# Patient Record
Sex: FEMALE | Race: BLACK OR AFRICAN AMERICAN | Employment: OTHER | URBAN - METROPOLITAN AREA
[De-identification: names, ages, dates, MRNs, and addresses within clinical notes are randomized per-mention and may not be internally consistent; named-entity substitution may affect disease eponyms.]

---

## 2017-01-13 ENCOUNTER — HOSPITAL ENCOUNTER (OUTPATIENT)
Dept: GENERAL RADIOLOGY | Age: 77
Discharge: HOME | End: 2017-01-13

## 2017-01-13 LAB
ALBUMIN SERPL-MCNC: 4.4 GM/DL (ref 3.4–5)
ALP BLD-CCNC: 90 IU/L (ref 40–128)
ALT SERPL-CCNC: 17 U/L (ref 10–40)
ANION GAP SERPL CALCULATED.3IONS-SCNC: 16 MMOL/L (ref 4–16)
AST SERPL-CCNC: 31 IU/L (ref 15–37)
BASOPHILS ABSOLUTE: 0 K/CU MM
BASOPHILS RELATIVE PERCENT: 0.6 % (ref 0–1)
BILIRUB SERPL-MCNC: 0.6 MG/DL (ref 0–1)
BUN BLDV-MCNC: 21 MG/DL (ref 6–23)
CALCIUM SERPL-MCNC: 9.5 MG/DL (ref 8.3–10.6)
CHLORIDE BLD-SCNC: 99 MMOL/L (ref 99–110)
CHOLESTEROL: 203 MG/DL
CO2: 24 MMOL/L (ref 21–32)
CREAT SERPL-MCNC: 1.6 MG/DL (ref 0.6–1.1)
CREATININE URINE: 165.4 MG/DL (ref 28–217)
DIFFERENTIAL TYPE: ABNORMAL
EOSINOPHILS ABSOLUTE: 0.3 K/CU MM
EOSINOPHILS RELATIVE PERCENT: 4 % (ref 0–3)
ESTIMATED AVERAGE GLUCOSE: 120 MG/DL
GFR AFRICAN AMERICAN: 38 ML/MIN/1.73M2
GFR NON-AFRICAN AMERICAN: 31 ML/MIN/1.73M2
GLUCOSE FASTING: 86 MG/DL (ref 70–99)
HBA1C MFR BLD: 5.8 % (ref 4.2–6.3)
HCT VFR BLD CALC: 39.5 % (ref 37–47)
HDLC SERPL-MCNC: 51 MG/DL
HEMOGLOBIN: 12.1 GM/DL (ref 12.5–16)
IMMATURE NEUTROPHIL %: 0.1 % (ref 0–0.43)
LDL CHOLESTEROL CALCULATED: 127 MG/DL
LYMPHOCYTES ABSOLUTE: 2.8 K/CU MM
LYMPHOCYTES RELATIVE PERCENT: 39.2 % (ref 24–44)
MCH RBC QN AUTO: 28.9 PG (ref 27–31)
MCHC RBC AUTO-ENTMCNC: 30.6 % (ref 32–36)
MCV RBC AUTO: 94.3 FL (ref 78–100)
MICROALBUMIN/CREAT 24H UR: <1.2 MG/DL
MICROALBUMIN/CREAT UR-RTO: NORMAL MG/G CREAT (ref 0–30)
MONOCYTES ABSOLUTE: 0.6 K/CU MM
MONOCYTES RELATIVE PERCENT: 7.9 % (ref 0–4)
NUCLEATED RBC %: 0 %
PDW BLD-RTO: 14.3 % (ref 11.7–14.9)
PLATELET # BLD: 352 K/CU MM (ref 140–440)
PMV BLD AUTO: 10.6 FL (ref 7.5–11.1)
POTASSIUM SERPL-SCNC: 4.3 MMOL/L (ref 3.5–5.1)
RBC # BLD: 4.19 M/CU MM (ref 4.2–5.4)
SEGMENTED NEUTROPHILS ABSOLUTE COUNT: 3.5 K/CU MM
SEGMENTED NEUTROPHILS RELATIVE PERCENT: 48.2 % (ref 36–66)
SODIUM BLD-SCNC: 139 MMOL/L (ref 135–145)
TOTAL CK: 101 IU/L (ref 26–140)
TOTAL IMMATURE NEUTOROPHIL: 0.01 K/CU MM
TOTAL NUCLEATED RBC: 0 K/CU MM
TOTAL PROTEIN: 7.8 GM/DL (ref 6.4–8.2)
TRIGL SERPL-MCNC: 125 MG/DL
WBC # BLD: 7.2 K/CU MM (ref 4–10.5)

## 2017-06-30 ENCOUNTER — HOSPITAL ENCOUNTER (OUTPATIENT)
Dept: GENERAL RADIOLOGY | Age: 77
Discharge: HOME | End: 2017-06-30

## 2017-06-30 LAB
ALBUMIN SERPL-MCNC: 4.4 GM/DL (ref 3.4–5)
ALP BLD-CCNC: 88 IU/L (ref 40–128)
ALT SERPL-CCNC: 18 U/L (ref 10–40)
ANION GAP SERPL CALCULATED.3IONS-SCNC: 19 MMOL/L (ref 4–16)
AST SERPL-CCNC: 33 IU/L (ref 15–37)
BASOPHILS ABSOLUTE: 0 K/CU MM
BASOPHILS RELATIVE PERCENT: 0.5 % (ref 0–1)
BILIRUB SERPL-MCNC: 0.6 MG/DL (ref 0–1)
BUN BLDV-MCNC: 21 MG/DL (ref 6–23)
CALCIUM SERPL-MCNC: 9.6 MG/DL (ref 8.3–10.6)
CHLORIDE BLD-SCNC: 100 MMOL/L (ref 99–110)
CHOLESTEROL: 183 MG/DL
CO2: 21 MMOL/L (ref 21–32)
CREAT SERPL-MCNC: 1.6 MG/DL (ref 0.6–1.1)
CREATININE URINE: 210 MG/DL (ref 28–217)
DIFFERENTIAL TYPE: ABNORMAL
EOSINOPHILS ABSOLUTE: 0.2 K/CU MM
EOSINOPHILS RELATIVE PERCENT: 2.1 % (ref 0–3)
ESTIMATED AVERAGE GLUCOSE: 128 MG/DL
GFR AFRICAN AMERICAN: 38 ML/MIN/1.73M2
GFR NON-AFRICAN AMERICAN: 31 ML/MIN/1.73M2
GLUCOSE FASTING: 97 MG/DL (ref 70–99)
HBA1C MFR BLD: 6.1 % (ref 4.2–6.3)
HCT VFR BLD CALC: 37.8 % (ref 37–47)
HDLC SERPL-MCNC: 54 MG/DL
HEMOGLOBIN: 11.8 GM/DL (ref 12.5–16)
IMMATURE NEUTROPHIL %: 0.2 % (ref 0–0.43)
LDL CHOLESTEROL CALCULATED: 110 MG/DL
LYMPHOCYTES ABSOLUTE: 3.3 K/CU MM
LYMPHOCYTES RELATIVE PERCENT: 39.5 % (ref 24–44)
MCH RBC QN AUTO: 29.1 PG (ref 27–31)
MCHC RBC AUTO-ENTMCNC: 31.2 % (ref 32–36)
MCV RBC AUTO: 93.1 FL (ref 78–100)
MICROALBUMIN/CREAT 24H UR: <1.2 MG/DL
MICROALBUMIN/CREAT UR-RTO: NORMAL MG/G CREAT (ref 0–30)
MONOCYTES ABSOLUTE: 0.7 K/CU MM
MONOCYTES RELATIVE PERCENT: 7.9 % (ref 0–4)
NUCLEATED RBC %: 0 %
PDW BLD-RTO: 13.5 % (ref 11.7–14.9)
PLATELET # BLD: 377 K/CU MM (ref 140–440)
PMV BLD AUTO: 10.5 FL (ref 7.5–11.1)
POTASSIUM SERPL-SCNC: 4.5 MMOL/L (ref 3.5–5.1)
RBC # BLD: 4.06 M/CU MM (ref 4.2–5.4)
SEGMENTED NEUTROPHILS ABSOLUTE COUNT: 4.1 K/CU MM
SEGMENTED NEUTROPHILS RELATIVE PERCENT: 49.8 % (ref 36–66)
SODIUM BLD-SCNC: 140 MMOL/L (ref 135–145)
TOTAL CK: 140 IU/L (ref 26–140)
TOTAL IMMATURE NEUTOROPHIL: 0.02 K/CU MM
TOTAL NUCLEATED RBC: 0 K/CU MM
TOTAL PROTEIN: 8 GM/DL (ref 6.4–8.2)
TRIGL SERPL-MCNC: 95 MG/DL
WBC # BLD: 8.2 K/CU MM (ref 4–10.5)

## 2017-09-18 ENCOUNTER — HOSPITAL ENCOUNTER (OUTPATIENT)
Dept: WOMENS IMAGING | Age: 77
Discharge: HOME | End: 2017-09-18

## 2017-09-18 DIAGNOSIS — Z12.31 VISIT FOR SCREENING MAMMOGRAM: ICD-10-CM

## 2017-11-10 ENCOUNTER — HOSPITAL ENCOUNTER (OUTPATIENT)
Dept: GENERAL RADIOLOGY | Age: 77
Discharge: HOME | End: 2017-11-10

## 2017-11-10 LAB
ALBUMIN SERPL-MCNC: 4.5 GM/DL (ref 3.4–5)
ALP BLD-CCNC: 87 IU/L (ref 40–128)
ALT SERPL-CCNC: 16 U/L (ref 10–40)
ANION GAP SERPL CALCULATED.3IONS-SCNC: 15 MMOL/L (ref 4–16)
AST SERPL-CCNC: 29 IU/L (ref 15–37)
BASOPHILS ABSOLUTE: 0.1 K/CU MM
BASOPHILS RELATIVE PERCENT: 0.5 % (ref 0–1)
BILIRUB SERPL-MCNC: 0.5 MG/DL (ref 0–1)
BUN BLDV-MCNC: 20 MG/DL (ref 6–23)
CALCIUM SERPL-MCNC: 9.3 MG/DL (ref 8.3–10.6)
CHLORIDE BLD-SCNC: 99 MMOL/L (ref 99–110)
CHOLESTEROL: 206 MG/DL
CO2: 25 MMOL/L (ref 21–32)
CREAT SERPL-MCNC: 1.4 MG/DL (ref 0.6–1.1)
CREATININE URINE: 143.7 MG/DL (ref 28–217)
DIFFERENTIAL TYPE: ABNORMAL
EOSINOPHILS ABSOLUTE: 0.4 K/CU MM
EOSINOPHILS RELATIVE PERCENT: 3.6 % (ref 0–3)
ESTIMATED AVERAGE GLUCOSE: 120 MG/DL
GFR AFRICAN AMERICAN: 44 ML/MIN/1.73M2
GFR NON-AFRICAN AMERICAN: 37 ML/MIN/1.73M2
GLUCOSE FASTING: 86 MG/DL (ref 70–99)
HBA1C MFR BLD: 5.8 % (ref 4.2–6.3)
HCT VFR BLD CALC: 38.2 % (ref 37–47)
HDLC SERPL-MCNC: 42 MG/DL
HEMOGLOBIN: 12.1 GM/DL (ref 12.5–16)
IMMATURE NEUTROPHIL %: 0.2 % (ref 0–0.43)
LDL CHOLESTEROL DIRECT: 136 MG/DL
LYMPHOCYTES ABSOLUTE: 3.4 K/CU MM
LYMPHOCYTES RELATIVE PERCENT: 35.3 % (ref 24–44)
MCH RBC QN AUTO: 29.8 PG (ref 27–31)
MCHC RBC AUTO-ENTMCNC: 31.7 % (ref 32–36)
MCV RBC AUTO: 94.1 FL (ref 78–100)
MICROALBUMIN/CREAT 24H UR: <1.2 MG/DL
MICROALBUMIN/CREAT UR-RTO: NORMAL MG/G CREAT (ref 0–30)
MONOCYTES ABSOLUTE: 0.6 K/CU MM
MONOCYTES RELATIVE PERCENT: 6.5 % (ref 0–4)
NUCLEATED RBC %: 0 %
PDW BLD-RTO: 14.2 % (ref 11.7–14.9)
PLATELET # BLD: 377 K/CU MM (ref 140–440)
PMV BLD AUTO: 10.5 FL (ref 7.5–11.1)
POTASSIUM SERPL-SCNC: 4.4 MMOL/L (ref 3.5–5.1)
RBC # BLD: 4.06 M/CU MM (ref 4.2–5.4)
SEGMENTED NEUTROPHILS ABSOLUTE COUNT: 5.2 K/CU MM
SEGMENTED NEUTROPHILS RELATIVE PERCENT: 53.9 % (ref 36–66)
SODIUM BLD-SCNC: 139 MMOL/L (ref 135–145)
TOTAL CK: 94 IU/L (ref 26–140)
TOTAL IMMATURE NEUTOROPHIL: 0.02 K/CU MM
TOTAL NUCLEATED RBC: 0 K/CU MM
TOTAL PROTEIN: 8 GM/DL (ref 6.4–8.2)
TRIGL SERPL-MCNC: 220 MG/DL
WBC # BLD: 9.6 K/CU MM (ref 4–10.5)

## 2018-02-13 ENCOUNTER — HOSPITAL ENCOUNTER (OUTPATIENT)
Dept: GENERAL RADIOLOGY | Age: 78
Discharge: HOME | End: 2018-02-13

## 2018-02-13 LAB
ESTIMATED AVERAGE GLUCOSE: 123 MG/DL
HBA1C MFR BLD: 5.9 % (ref 4.2–6.3)

## 2018-05-15 ENCOUNTER — HOSPITAL ENCOUNTER (OUTPATIENT)
Dept: GENERAL RADIOLOGY | Age: 78
Discharge: HOME | End: 2018-05-15

## 2018-05-15 LAB
ALBUMIN SERPL-MCNC: 4.4 GM/DL (ref 3.4–5)
ALP BLD-CCNC: 80 IU/L (ref 40–128)
ALT SERPL-CCNC: 12 U/L (ref 10–40)
ANION GAP SERPL CALCULATED.3IONS-SCNC: 17 MMOL/L (ref 4–16)
AST SERPL-CCNC: 22 IU/L (ref 15–37)
BASOPHILS ABSOLUTE: 0 K/CU MM
BASOPHILS RELATIVE PERCENT: 0.5 % (ref 0–1)
BILIRUB SERPL-MCNC: 0.5 MG/DL (ref 0–1)
BUN BLDV-MCNC: 23 MG/DL (ref 6–23)
CALCIUM SERPL-MCNC: 9.4 MG/DL (ref 8.3–10.6)
CHLORIDE BLD-SCNC: 101 MMOL/L (ref 99–110)
CHOLESTEROL: 173 MG/DL
CO2: 23 MMOL/L (ref 21–32)
CREAT SERPL-MCNC: 1.6 MG/DL (ref 0.6–1.1)
CREATININE URINE: 320.4 MG/DL (ref 28–217)
DIFFERENTIAL TYPE: ABNORMAL
EOSINOPHILS ABSOLUTE: 0.3 K/CU MM
EOSINOPHILS RELATIVE PERCENT: 4.3 % (ref 0–3)
ESTIMATED AVERAGE GLUCOSE: 128 MG/DL
GFR AFRICAN AMERICAN: 38 ML/MIN/1.73M2
GFR NON-AFRICAN AMERICAN: 31 ML/MIN/1.73M2
GLUCOSE FASTING: 93 MG/DL (ref 70–99)
HBA1C MFR BLD: 6.1 % (ref 4.2–6.3)
HCT VFR BLD CALC: 38.3 % (ref 37–47)
HDLC SERPL-MCNC: 46 MG/DL
HEMOGLOBIN: 12.1 GM/DL (ref 12.5–16)
IMMATURE NEUTROPHIL %: 0.2 % (ref 0–0.43)
LDL CHOLESTEROL DIRECT: 124 MG/DL
LYMPHOCYTES ABSOLUTE: 2.7 K/CU MM
LYMPHOCYTES RELATIVE PERCENT: 42.7 % (ref 24–44)
MCH RBC QN AUTO: 30 PG (ref 27–31)
MCHC RBC AUTO-ENTMCNC: 31.6 % (ref 32–36)
MCV RBC AUTO: 95 FL (ref 78–100)
MICROALBUMIN/CREAT 24H UR: 2.3 MG/DL
MICROALBUMIN/CREAT UR-RTO: 7.2 MG/G CREAT (ref 0–30)
MONOCYTES ABSOLUTE: 0.6 K/CU MM
MONOCYTES RELATIVE PERCENT: 9.5 % (ref 0–4)
NUCLEATED RBC %: 0 %
PDW BLD-RTO: 13.9 % (ref 11.7–14.9)
PLATELET # BLD: 345 K/CU MM (ref 140–440)
PMV BLD AUTO: 10.3 FL (ref 7.5–11.1)
POTASSIUM SERPL-SCNC: 3.9 MMOL/L (ref 3.5–5.1)
RBC # BLD: 4.03 M/CU MM (ref 4.2–5.4)
SEGMENTED NEUTROPHILS ABSOLUTE COUNT: 2.7 K/CU MM
SEGMENTED NEUTROPHILS RELATIVE PERCENT: 42.8 % (ref 36–66)
SODIUM BLD-SCNC: 141 MMOL/L (ref 135–145)
TOTAL CK: 123 IU/L (ref 26–140)
TOTAL IMMATURE NEUTOROPHIL: 0.01 K/CU MM
TOTAL NUCLEATED RBC: 0 K/CU MM
TOTAL PROTEIN: 8 GM/DL (ref 6.4–8.2)
TRIGL SERPL-MCNC: 136 MG/DL
WBC # BLD: 6.3 K/CU MM (ref 4–10.5)

## 2018-08-13 ENCOUNTER — HOSPITAL ENCOUNTER (OUTPATIENT)
Dept: GENERAL RADIOLOGY | Age: 78
Discharge: HOME | End: 2018-08-13

## 2018-08-13 LAB
ESTIMATED AVERAGE GLUCOSE: 128 MG/DL
HBA1C MFR BLD: 6.1 % (ref 4.2–6.3)

## 2018-11-02 ENCOUNTER — HOSPITAL ENCOUNTER (OUTPATIENT)
Dept: WOMENS IMAGING | Age: 78
Discharge: HOME | End: 2018-11-02
Payer: COMMERCIAL

## 2018-11-02 DIAGNOSIS — Z12.31 SCREENING MAMMOGRAM, ENCOUNTER FOR: ICD-10-CM

## 2018-11-02 PROCEDURE — 77067 SCR MAMMO BI INCL CAD: CPT

## 2018-11-10 ENCOUNTER — APPOINTMENT (OUTPATIENT)
Dept: GENERAL RADIOLOGY | Age: 78
End: 2018-11-10
Payer: COMMERCIAL

## 2018-11-10 ENCOUNTER — HOSPITAL ENCOUNTER (EMERGENCY)
Age: 78
Discharge: HOME | End: 2018-11-10
Attending: EMERGENCY MEDICINE
Payer: COMMERCIAL

## 2018-11-10 VITALS
SYSTOLIC BLOOD PRESSURE: 155 MMHG | DIASTOLIC BLOOD PRESSURE: 60 MMHG | TEMPERATURE: 98.8 F | HEART RATE: 86 BPM | OXYGEN SATURATION: 99 % | RESPIRATION RATE: 18 BRPM | WEIGHT: 235 LBS | HEIGHT: 64 IN | BODY MASS INDEX: 40.12 KG/M2

## 2018-11-10 DIAGNOSIS — J02.9 ACUTE PHARYNGITIS, UNSPECIFIED ETIOLOGY: Primary | ICD-10-CM

## 2018-11-10 PROCEDURE — 6370000000 HC RX 637 (ALT 250 FOR IP): Performed by: EMERGENCY MEDICINE

## 2018-11-10 PROCEDURE — 71046 X-RAY EXAM CHEST 2 VIEWS: CPT

## 2018-11-10 PROCEDURE — 99283 EMERGENCY DEPT VISIT LOW MDM: CPT

## 2018-11-10 RX ORDER — BENZONATATE 100 MG/1
100 CAPSULE ORAL 3 TIMES DAILY PRN
Qty: 10 CAPSULE | Refills: 0 | Status: SHIPPED | OUTPATIENT
Start: 2018-11-10 | End: 2018-11-17

## 2018-11-10 RX ORDER — IBUPROFEN 600 MG/1
600 TABLET ORAL ONCE
Status: COMPLETED | OUTPATIENT
Start: 2018-11-10 | End: 2018-11-10

## 2018-11-10 RX ORDER — GUAIFENESIN 100 MG/5ML
200 SOLUTION ORAL EVERY 4 HOURS PRN
Status: DISCONTINUED | OUTPATIENT
Start: 2018-11-10 | End: 2018-11-10 | Stop reason: HOSPADM

## 2018-11-10 RX ADMIN — GUAIFENESIN 200 MG: 200 SOLUTION ORAL at 06:20

## 2018-11-10 RX ADMIN — IBUPROFEN 600 MG: 600 TABLET, FILM COATED ORAL at 06:21

## 2018-11-10 ASSESSMENT — PAIN SCALES - GENERAL
PAINLEVEL_OUTOF10: 10
PAINLEVEL_OUTOF10: 10

## 2018-11-10 ASSESSMENT — PAIN DESCRIPTION - PAIN TYPE: TYPE: ACUTE PAIN

## 2018-11-10 ASSESSMENT — PAIN DESCRIPTION - LOCATION: LOCATION: THROAT

## 2018-11-10 NOTE — ED PROVIDER NOTES
Umbilical Hernia Repair    HIATAL HERNIA REPAIR  2014    INGUINAL HERNIA REPAIR Right 1980's Or 1990's    TONSILLECTOMY  1970's    TUBAL LIGATION  1965    WISDOM TOOTH EXTRACTION      Three Removed In Past     Family History   Problem Relation Age of Onset    Heart Disease Mother         AAA    Early Death Father 28        Heart Attack    Heart Disease Father         Heart Attack     Early Death Brother         In His 42's Or 52's    Early Death Brother 40        Heart Attack    Heart Disease Brother         Heart Attack    Other Daughter         Pseudo Brain Tumor    Early Death Son 28     Social History     Social History    Marital status: Single     Spouse name: N/A    Number of children: N/A    Years of education: N/A     Occupational History    Not on file.      Social History Main Topics    Smoking status: Former Smoker     Years: 7.00     Start date: 7/30/1971     Quit date: 7/30/1978    Smokeless tobacco: Never Used    Alcohol use No    Drug use: No      Comment: \"Smoked About 2 Or 3 Cigarettes A Day When Quit, I Didn't Ever Inhale\"    Sexual activity: No     Other Topics Concern    Not on file     Social History Narrative    No narrative on file     Current Facility-Administered Medications   Medication Dose Route Frequency Provider Last Rate Last Dose    guaiFENesin (ROBITUSSIN) 100 MG/5ML oral solution 200 mg  200 mg Oral Q4H PRN Jayson Goodman MD   200 mg at 11/10/18 0620     Current Outpatient Prescriptions   Medication Sig Dispense Refill    benzonatate (TESSALON PERLES) 100 MG capsule Take 1 capsule by mouth 3 times daily as needed for Cough 10 capsule 0    triamterene-hydrochlorothiazide (MAXZIDE-25) 37.5-25 MG per tablet TAKE 1 TABLET BY MOUTH ONE TIME A DAY  30 tablet 4    ondansetron (ZOFRAN ODT) 4 MG disintegrating tablet Take 1 tablet by mouth every 8 hours as needed for Nausea 15 tablet 0    HYDROcodone-acetaminophen (NORCO) 5-325 MG per tablet Take 1-2 tablets by currently available lab results from this visit (if applicable):  No results found for this visit on 11/10/18. Radiographs (if obtained):  [] The following radiograph was interpreted by myself in the absence of a radiologist:  [x] Radiologist's Report Reviewed:    EKG (if obtained): (All EKG's are interpreted by myself in the absence of a cardiologist)    MDM:  Plan of care is discussed thoroughly with the patient and family if present. If performed, all imaging and lab work also discussed with patient. All relevant prior results and chart reviewed if available. Patient presents with symptoms most consistent with likely viral URI infection. She is able tolerate her secretions. Vital signs initially significant for mild tachycardia but on my exam, the patient had normal heart rate was afebrile. No evidence of meningitis, PTA, retropharyngeal abscess or other obstructive process. Patient given guaifenesin and ibuprofen for symptoms. Patient doing well on reevaluation. Heart rate is in the 80s. Chest x-ray shows no evidence of pneumonia. Plan for discharge with Tessalon Perles, continue ibuprofen, oral rehydration and follow up with PCP in one day. She is agreeable with this plan of care. Clinical Impression:  1.  Acute pharyngitis, unspecified etiology      (Please note that portions of this note may have been completed with a voice recognition program. Efforts were made to edit the dictations but occasionally words are mis-transcribed.)    MD Juan Miguel Ontiveros MD  11/10/18 7993

## 2018-11-23 ENCOUNTER — HOSPITAL ENCOUNTER (OUTPATIENT)
Age: 78
Discharge: HOME | End: 2018-11-23
Payer: COMMERCIAL

## 2018-11-23 LAB
ALBUMIN SERPL-MCNC: 4.3 GM/DL (ref 3.4–5)
ALP BLD-CCNC: 72 IU/L (ref 40–128)
ALT SERPL-CCNC: 15 U/L (ref 10–40)
ANION GAP SERPL CALCULATED.3IONS-SCNC: 15 MMOL/L (ref 4–16)
AST SERPL-CCNC: 27 IU/L (ref 15–37)
BASOPHILS ABSOLUTE: 0 K/CU MM
BASOPHILS RELATIVE PERCENT: 0.4 % (ref 0–1)
BILIRUB SERPL-MCNC: 0.6 MG/DL (ref 0–1)
BUN BLDV-MCNC: 19 MG/DL (ref 6–23)
CALCIUM SERPL-MCNC: 9.2 MG/DL (ref 8.3–10.6)
CHLORIDE BLD-SCNC: 98 MMOL/L (ref 99–110)
CHOLESTEROL: 194 MG/DL
CO2: 25 MMOL/L (ref 21–32)
CREAT SERPL-MCNC: 1.3 MG/DL (ref 0.6–1.1)
CREATININE URINE: 281 MG/DL (ref 28–217)
DIFFERENTIAL TYPE: ABNORMAL
EOSINOPHILS ABSOLUTE: 0.3 K/CU MM
EOSINOPHILS RELATIVE PERCENT: 3.5 % (ref 0–3)
ESTIMATED AVERAGE GLUCOSE: 123 MG/DL
GFR AFRICAN AMERICAN: 48 ML/MIN/1.73M2
GFR NON-AFRICAN AMERICAN: 40 ML/MIN/1.73M2
GLUCOSE BLD-MCNC: 128 MG/DL (ref 70–99)
HBA1C MFR BLD: 5.9 % (ref 4.2–6.3)
HCT VFR BLD CALC: 37.1 % (ref 37–47)
HDLC SERPL-MCNC: 41 MG/DL
HEMOGLOBIN: 11.4 GM/DL (ref 12.5–16)
IMMATURE NEUTROPHIL %: 0.3 % (ref 0–0.43)
LDL CHOLESTEROL DIRECT: 143 MG/DL
LYMPHOCYTES ABSOLUTE: 2.7 K/CU MM
LYMPHOCYTES RELATIVE PERCENT: 34.1 % (ref 24–44)
MCH RBC QN AUTO: 29 PG (ref 27–31)
MCHC RBC AUTO-ENTMCNC: 30.7 % (ref 32–36)
MCV RBC AUTO: 94.4 FL (ref 78–100)
MICROALBUMIN/CREAT 24H UR: <1.2 MG/DL
MICROALBUMIN/CREAT UR-RTO: ABNORMAL MG/G CREAT (ref 0–30)
MONOCYTES ABSOLUTE: 0.6 K/CU MM
MONOCYTES RELATIVE PERCENT: 7.4 % (ref 0–4)
NUCLEATED RBC %: 0 %
PDW BLD-RTO: 13.5 % (ref 11.7–14.9)
PLATELET # BLD: 354 K/CU MM (ref 140–440)
PMV BLD AUTO: 10.4 FL (ref 7.5–11.1)
POTASSIUM SERPL-SCNC: 4 MMOL/L (ref 3.5–5.1)
RBC # BLD: 3.93 M/CU MM (ref 4.2–5.4)
SEGMENTED NEUTROPHILS ABSOLUTE COUNT: 4.3 K/CU MM
SEGMENTED NEUTROPHILS RELATIVE PERCENT: 54.3 % (ref 36–66)
SODIUM BLD-SCNC: 138 MMOL/L (ref 135–145)
TOTAL CK: 196 IU/L (ref 26–140)
TOTAL IMMATURE NEUTOROPHIL: 0.02 K/CU MM
TOTAL NUCLEATED RBC: 0 K/CU MM
TOTAL PROTEIN: 7.7 GM/DL (ref 6.4–8.2)
TRIGL SERPL-MCNC: 173 MG/DL
WBC # BLD: 7.8 K/CU MM (ref 4–10.5)

## 2018-11-23 PROCEDURE — 85025 COMPLETE CBC W/AUTO DIFF WBC: CPT

## 2018-11-23 PROCEDURE — 36415 COLL VENOUS BLD VENIPUNCTURE: CPT

## 2018-11-23 PROCEDURE — 82043 UR ALBUMIN QUANTITATIVE: CPT

## 2018-11-23 PROCEDURE — 83721 ASSAY OF BLOOD LIPOPROTEIN: CPT

## 2018-11-23 PROCEDURE — 82570 ASSAY OF URINE CREATININE: CPT

## 2018-11-23 PROCEDURE — 82550 ASSAY OF CK (CPK): CPT

## 2018-11-23 PROCEDURE — 80053 COMPREHEN METABOLIC PANEL: CPT

## 2018-11-23 PROCEDURE — 80061 LIPID PANEL: CPT

## 2018-11-23 PROCEDURE — 83036 HEMOGLOBIN GLYCOSYLATED A1C: CPT

## 2018-11-26 ENCOUNTER — HOSPITAL ENCOUNTER (OUTPATIENT)
Age: 78
Setting detail: SPECIMEN
Discharge: HOME | End: 2018-11-26
Payer: COMMERCIAL

## 2018-11-26 LAB
BACTERIA: ABNORMAL /HPF
BILIRUBIN URINE: NEGATIVE MG/DL
BLOOD, URINE: NEGATIVE
CLARITY: CLEAR
COLOR: YELLOW
GLUCOSE, URINE: NEGATIVE MG/DL
HYALINE CASTS: 0 /LPF
KETONES, URINE: NEGATIVE MG/DL
LEUKOCYTE ESTERASE, URINE: ABNORMAL
MUCUS: ABNORMAL HPF
NITRITE URINE, QUANTITATIVE: NEGATIVE
PH, URINE: 5 (ref 5–8)
PROTEIN UA: NEGATIVE MG/DL
RBC URINE: 1 /HPF (ref 0–6)
SPECIFIC GRAVITY UA: 1.02 (ref 1–1.03)
SQUAMOUS EPITHELIAL: 9 /HPF
TRANSITIONAL EPITHELIAL: 1 /HPF
TRICHOMONAS: ABNORMAL /HPF
UROBILINOGEN, URINE: NORMAL MG/DL (ref 0.2–1)
WBC UA: 2 /HPF (ref 0–5)

## 2018-11-26 PROCEDURE — 87077 CULTURE AEROBIC IDENTIFY: CPT

## 2018-11-26 PROCEDURE — 87086 URINE CULTURE/COLONY COUNT: CPT

## 2018-11-26 PROCEDURE — 87186 SC STD MICRODIL/AGAR DIL: CPT

## 2018-11-26 PROCEDURE — 81001 URINALYSIS AUTO W/SCOPE: CPT

## 2018-11-29 LAB
CULTURE: NORMAL
CULTURE: NORMAL
Lab: NORMAL
ORGANISM: NORMAL
REPORT STATUS: NORMAL
SPECIMEN: NORMAL
TOTAL COLONY COUNT: NORMAL

## 2019-01-01 ENCOUNTER — HOSPITAL ENCOUNTER (OUTPATIENT)
Dept: GENERAL RADIOLOGY | Age: 79
Discharge: HOME | End: 2019-06-17
Payer: COMMERCIAL

## 2019-01-01 ENCOUNTER — OFFICE VISIT (OUTPATIENT)
Dept: PHYSICAL MEDICINE AND REHAB | Age: 79
End: 2019-01-01
Payer: COMMERCIAL

## 2019-01-01 ENCOUNTER — HOSPITAL ENCOUNTER (OUTPATIENT)
Age: 79
Discharge: HOME | End: 2019-12-05
Payer: COMMERCIAL

## 2019-01-01 ENCOUNTER — HOSPITAL ENCOUNTER (OUTPATIENT)
Dept: CT IMAGING | Age: 79
Discharge: HOME | End: 2019-12-05
Payer: COMMERCIAL

## 2019-01-01 ENCOUNTER — ANESTHESIA (OUTPATIENT)
Dept: ENDOSCOPY | Age: 79
End: 2019-01-01
Payer: COMMERCIAL

## 2019-01-01 ENCOUNTER — HOSPITAL ENCOUNTER (OUTPATIENT)
Age: 79
Setting detail: SPECIMEN
Discharge: HOME | End: 2019-12-07
Payer: COMMERCIAL

## 2019-01-01 ENCOUNTER — HOSPITAL ENCOUNTER (OUTPATIENT)
Age: 79
Setting detail: SPECIMEN
Discharge: HOME | End: 2019-12-10
Payer: COMMERCIAL

## 2019-01-01 ENCOUNTER — HOSPITAL ENCOUNTER (OUTPATIENT)
Age: 79
Discharge: HOME | End: 2019-06-17
Payer: COMMERCIAL

## 2019-01-01 ENCOUNTER — HOSPITAL ENCOUNTER (OUTPATIENT)
Dept: WOMENS IMAGING | Age: 79
Discharge: HOME | End: 2019-12-05
Payer: COMMERCIAL

## 2019-01-01 ENCOUNTER — HOSPITAL ENCOUNTER (OUTPATIENT)
Age: 79
Discharge: HOME | End: 2019-11-15
Payer: COMMERCIAL

## 2019-01-01 ENCOUNTER — HOSPITAL ENCOUNTER (OUTPATIENT)
Age: 79
Discharge: HOME | End: 2019-09-19
Payer: COMMERCIAL

## 2019-01-01 ENCOUNTER — APPOINTMENT (OUTPATIENT)
Dept: NUCLEAR MEDICINE | Age: 79
End: 2019-01-01
Payer: COMMERCIAL

## 2019-01-01 ENCOUNTER — ANESTHESIA EVENT (OUTPATIENT)
Dept: ENDOSCOPY | Age: 79
End: 2019-01-01
Payer: COMMERCIAL

## 2019-01-01 ENCOUNTER — APPOINTMENT (OUTPATIENT)
Dept: CT IMAGING | Age: 79
End: 2019-01-01
Payer: COMMERCIAL

## 2019-01-01 ENCOUNTER — HOSPITAL ENCOUNTER (OUTPATIENT)
Age: 79
Setting detail: OBSERVATION
Discharge: HOME | End: 2019-12-28
Attending: EMERGENCY MEDICINE | Admitting: INTERNAL MEDICINE
Payer: COMMERCIAL

## 2019-01-01 VITALS
TEMPERATURE: 98.5 F | HEIGHT: 64 IN | OXYGEN SATURATION: 98 % | HEART RATE: 67 BPM | WEIGHT: 227.6 LBS | BODY MASS INDEX: 38.86 KG/M2 | SYSTOLIC BLOOD PRESSURE: 124 MMHG | DIASTOLIC BLOOD PRESSURE: 52 MMHG | RESPIRATION RATE: 17 BRPM

## 2019-01-01 VITALS — OXYGEN SATURATION: 96 % | SYSTOLIC BLOOD PRESSURE: 107 MMHG | DIASTOLIC BLOOD PRESSURE: 37 MMHG

## 2019-01-01 DIAGNOSIS — M17.0 PRIMARY OSTEOARTHRITIS OF BOTH KNEES: ICD-10-CM

## 2019-01-01 DIAGNOSIS — E11.42 DIABETIC SENSORIMOTOR POLYNEUROPATHY (HCC): Primary | ICD-10-CM

## 2019-01-01 DIAGNOSIS — R10.10 PAIN OF UPPER ABDOMEN: ICD-10-CM

## 2019-01-01 DIAGNOSIS — Z12.31 VISIT FOR SCREENING MAMMOGRAM: ICD-10-CM

## 2019-01-01 DIAGNOSIS — D64.9 ANEMIA, UNSPECIFIED TYPE: ICD-10-CM

## 2019-01-01 DIAGNOSIS — R20.2 PARESTHESIA OF BILATERAL LEGS: ICD-10-CM

## 2019-01-01 DIAGNOSIS — M54.17 LUMBOSACRAL RADICULOPATHY AT S1: ICD-10-CM

## 2019-01-01 LAB
ALBUMIN ELP: 3.8 GM/DL (ref 3.2–5.6)
ALBUMIN SERPL-MCNC: 3.4 GM/DL (ref 3.4–5)
ALBUMIN SERPL-MCNC: 3.9 GM/DL (ref 3.4–5)
ALBUMIN SERPL-MCNC: 4 GM/DL (ref 3.4–5)
ALBUMIN SERPL-MCNC: 4.1 GM/DL (ref 3.4–5)
ALBUMIN SERPL-MCNC: 4.2 GM/DL (ref 3.4–5)
ALBUMIN SERPL-MCNC: 4.3 GM/DL (ref 3.4–5)
ALBUMIN, U: 100 %
ALP BLD-CCNC: 55 IU/L (ref 40–129)
ALP BLD-CCNC: 66 IU/L (ref 40–129)
ALP BLD-CCNC: 67 IU/L (ref 40–128)
ALP BLD-CCNC: 70 IU/L (ref 40–128)
ALP BLD-CCNC: 77 IU/L (ref 40–129)
ALP BLD-CCNC: 86 IU/L (ref 40–128)
ALPHA-1-GLOBULIN, U: 0 %
ALPHA-1-GLOBULIN: 0.3 GM/DL (ref 0.1–0.4)
ALPHA-2-GLOBULIN, U: 0 %
ALPHA-2-GLOBULIN: 0.9 GM/DL (ref 0.4–1.2)
ALT SERPL-CCNC: 11 U/L (ref 10–40)
ALT SERPL-CCNC: 12 U/L (ref 10–40)
ALT SERPL-CCNC: 12 U/L (ref 10–40)
ALT SERPL-CCNC: 14 U/L (ref 10–40)
ALT SERPL-CCNC: 15 U/L (ref 10–40)
ALT SERPL-CCNC: 16 U/L (ref 10–40)
ANION GAP SERPL CALCULATED.3IONS-SCNC: 12 MMOL/L (ref 4–16)
ANION GAP SERPL CALCULATED.3IONS-SCNC: 13 MMOL/L (ref 4–16)
ANION GAP SERPL CALCULATED.3IONS-SCNC: 14 MMOL/L (ref 4–16)
ANION GAP SERPL CALCULATED.3IONS-SCNC: 14 MMOL/L (ref 4–16)
ANION GAP SERPL CALCULATED.3IONS-SCNC: 17 MMOL/L (ref 4–16)
ANION GAP SERPL CALCULATED.3IONS-SCNC: 18 MMOL/L (ref 4–16)
ANION GAP SERPL CALCULATED.3IONS-SCNC: 19 MMOL/L (ref 4–16)
ANTI-NUCLEAR ANTIBODY (ANA): NORMAL
ANTI-NUCLEAR ANTIBODY (ANA): NORMAL
AST SERPL-CCNC: 21 IU/L (ref 15–37)
AST SERPL-CCNC: 22 IU/L (ref 15–37)
AST SERPL-CCNC: 27 IU/L (ref 15–37)
AST SERPL-CCNC: 29 IU/L (ref 15–37)
AST SERPL-CCNC: 31 IU/L (ref 15–37)
AST SERPL-CCNC: 32 IU/L (ref 15–37)
BACTERIA: ABNORMAL /HPF
BACTERIA: NEGATIVE /HPF
BACTERIA: NEGATIVE /HPF
BASOPHILS ABSOLUTE: 0 K/CU MM
BASOPHILS RELATIVE PERCENT: 0.2 % (ref 0–1)
BASOPHILS RELATIVE PERCENT: 0.3 % (ref 0–1)
BASOPHILS RELATIVE PERCENT: 0.4 % (ref 0–1)
BASOPHILS RELATIVE PERCENT: 0.5 % (ref 0–1)
BASOPHILS RELATIVE PERCENT: 0.6 % (ref 0–1)
BETA GLOBULIN, U: 0 %
BETA GLOBULIN: 1.2 GM/DL (ref 0.5–1.3)
BILIRUB SERPL-MCNC: 0.3 MG/DL (ref 0–1)
BILIRUB SERPL-MCNC: 0.4 MG/DL (ref 0–1)
BILIRUB SERPL-MCNC: 0.5 MG/DL (ref 0–1)
BILIRUBIN DIRECT: 0.2 MG/DL (ref 0–0.3)
BILIRUBIN URINE: NEGATIVE MG/DL
BILIRUBIN, INDIRECT: 0.1 MG/DL (ref 0–0.7)
BLOOD, URINE: ABNORMAL
BLOOD, URINE: NEGATIVE
BLOOD, URINE: NEGATIVE
BUN BLDV-MCNC: 16 MG/DL (ref 6–23)
BUN BLDV-MCNC: 16 MG/DL (ref 6–23)
BUN BLDV-MCNC: 17 MG/DL (ref 6–23)
BUN BLDV-MCNC: 18 MG/DL (ref 6–23)
BUN BLDV-MCNC: 20 MG/DL (ref 6–23)
BUN BLDV-MCNC: 23 MG/DL (ref 6–23)
BUN BLDV-MCNC: 29 MG/DL (ref 6–23)
C-REACTIVE PROTEIN, HIGH SENSITIVITY: 0.7 MG/L
CALCIUM SERPL-MCNC: 8.1 MG/DL (ref 8.3–10.6)
CALCIUM SERPL-MCNC: 8.6 MG/DL (ref 8.3–10.6)
CALCIUM SERPL-MCNC: 8.6 MG/DL (ref 8.3–10.6)
CALCIUM SERPL-MCNC: 8.9 MG/DL (ref 8.3–10.6)
CALCIUM SERPL-MCNC: 9.4 MG/DL (ref 8.3–10.6)
CHLORIDE BLD-SCNC: 100 MMOL/L (ref 99–110)
CHLORIDE BLD-SCNC: 101 MMOL/L (ref 99–110)
CHLORIDE BLD-SCNC: 103 MMOL/L (ref 99–110)
CHLORIDE BLD-SCNC: 96 MMOL/L (ref 99–110)
CHLORIDE BLD-SCNC: 98 MMOL/L (ref 99–110)
CHOLESTEROL, FASTING: 219 MG/DL
CHOLESTEROL: 152 MG/DL
CHOLESTEROL: 159 MG/DL
CHOLESTEROL: 177 MG/DL
CLARITY: ABNORMAL
CLOTEST: NEGATIVE
CO2: 17 MMOL/L (ref 21–32)
CO2: 19 MMOL/L (ref 21–32)
CO2: 20 MMOL/L (ref 21–32)
CO2: 20 MMOL/L (ref 21–32)
CO2: 22 MMOL/L (ref 21–32)
CO2: 24 MMOL/L (ref 21–32)
CO2: 26 MMOL/L (ref 21–32)
COLOR: ABNORMAL
COLOR: YELLOW
COLOR: YELLOW
CREAT SERPL-MCNC: 1.5 MG/DL (ref 0.6–1.1)
CREAT SERPL-MCNC: 1.5 MG/DL (ref 0.6–1.1)
CREAT SERPL-MCNC: 1.7 MG/DL (ref 0.6–1.1)
CREAT SERPL-MCNC: 2 MG/DL (ref 0.6–1.1)
CREAT SERPL-MCNC: 2 MG/DL (ref 0.6–1.1)
CREAT SERPL-MCNC: 2.1 MG/DL (ref 0.6–1.1)
CREAT SERPL-MCNC: 2.3 MG/DL (ref 0.6–1.1)
CREATININE URINE: 277.4 MG/DL (ref 28–217)
CULTURE: ABNORMAL
DIFFERENTIAL TYPE: ABNORMAL
EKG ATRIAL RATE: 83 BPM
EKG DIAGNOSIS: NORMAL
EKG P AXIS: 58 DEGREES
EKG P-R INTERVAL: 186 MS
EKG Q-T INTERVAL: 374 MS
EKG QRS DURATION: 84 MS
EKG QTC CALCULATION (BAZETT): 439 MS
EKG R AXIS: 38 DEGREES
EKG T AXIS: 55 DEGREES
EKG VENTRICULAR RATE: 83 BPM
EOSINOPHILS ABSOLUTE: 0.1 K/CU MM
EOSINOPHILS ABSOLUTE: 0.1 K/CU MM
EOSINOPHILS ABSOLUTE: 0.2 K/CU MM
EOSINOPHILS ABSOLUTE: 0.2 K/CU MM
EOSINOPHILS ABSOLUTE: 0.3 K/CU MM
EOSINOPHILS RELATIVE PERCENT: 1.4 % (ref 0–3)
EOSINOPHILS RELATIVE PERCENT: 1.4 % (ref 0–3)
EOSINOPHILS RELATIVE PERCENT: 2.1 % (ref 0–3)
EOSINOPHILS RELATIVE PERCENT: 3.1 % (ref 0–3)
EOSINOPHILS RELATIVE PERCENT: 3.4 % (ref 0–3)
ERYTHROCYTE SEDIMENTATION RATE: 60 MM/HR (ref 0–30)
ESTIMATED AVERAGE GLUCOSE: 120 MG/DL
ESTIMATED AVERAGE GLUCOSE: 126 MG/DL
ESTIMATED AVERAGE GLUCOSE: 128 MG/DL
ESTIMATED AVERAGE GLUCOSE: 131 MG/DL
FERRITIN: 546 NG/ML (ref 15–150)
FOLATE: 3 NG/ML (ref 3.1–17.5)
GAMMA GLOBULIN, U: 0 %
GAMMA GLOBULIN: 1.2 GM/DL (ref 0.5–1.6)
GFR AFRICAN AMERICAN: 25 ML/MIN/1.73M2
GFR AFRICAN AMERICAN: 28 ML/MIN/1.73M2
GFR AFRICAN AMERICAN: 29 ML/MIN/1.73M2
GFR AFRICAN AMERICAN: 29 ML/MIN/1.73M2
GFR AFRICAN AMERICAN: 35 ML/MIN/1.73M2
GFR AFRICAN AMERICAN: 41 ML/MIN/1.73M2
GFR AFRICAN AMERICAN: 41 ML/MIN/1.73M2
GFR NON-AFRICAN AMERICAN: 21 ML/MIN/1.73M2
GFR NON-AFRICAN AMERICAN: 23 ML/MIN/1.73M2
GFR NON-AFRICAN AMERICAN: 24 ML/MIN/1.73M2
GFR NON-AFRICAN AMERICAN: 24 ML/MIN/1.73M2
GFR NON-AFRICAN AMERICAN: 29 ML/MIN/1.73M2
GFR NON-AFRICAN AMERICAN: 34 ML/MIN/1.73M2
GFR NON-AFRICAN AMERICAN: 34 ML/MIN/1.73M2
GLUCOSE BLD-MCNC: 100 MG/DL (ref 70–99)
GLUCOSE BLD-MCNC: 101 MG/DL (ref 70–99)
GLUCOSE BLD-MCNC: 103 MG/DL (ref 70–99)
GLUCOSE BLD-MCNC: 106 MG/DL (ref 70–99)
GLUCOSE BLD-MCNC: 109 MG/DL (ref 70–99)
GLUCOSE BLD-MCNC: 112 MG/DL (ref 70–99)
GLUCOSE BLD-MCNC: 113 MG/DL (ref 70–99)
GLUCOSE BLD-MCNC: 82 MG/DL (ref 70–99)
GLUCOSE BLD-MCNC: 84 MG/DL (ref 70–99)
GLUCOSE BLD-MCNC: 85 MG/DL (ref 70–99)
GLUCOSE BLD-MCNC: 85 MG/DL (ref 70–99)
GLUCOSE BLD-MCNC: 86 MG/DL (ref 70–99)
GLUCOSE BLD-MCNC: 88 MG/DL (ref 70–99)
GLUCOSE BLD-MCNC: 88 MG/DL (ref 70–99)
GLUCOSE BLD-MCNC: 89 MG/DL (ref 70–99)
GLUCOSE BLD-MCNC: 91 MG/DL (ref 70–99)
GLUCOSE BLD-MCNC: 92 MG/DL (ref 70–99)
GLUCOSE BLD-MCNC: 92 MG/DL (ref 70–99)
GLUCOSE BLD-MCNC: 94 MG/DL (ref 70–99)
GLUCOSE BLD-MCNC: 96 MG/DL (ref 70–99)
GLUCOSE BLD-MCNC: 98 MG/DL (ref 70–99)
GLUCOSE BLD-MCNC: 99 MG/DL (ref 70–99)
GLUCOSE BLD-MCNC: 99 MG/DL (ref 70–99)
GLUCOSE, URINE: NEGATIVE MG/DL
HBA1C MFR BLD: 5.8 % (ref 4.2–6.3)
HBA1C MFR BLD: 6 % (ref 4.2–6.3)
HBA1C MFR BLD: 6.1 % (ref 4.2–6.3)
HBA1C MFR BLD: 6.2 % (ref 4.2–6.3)
HCT VFR BLD CALC: 29.2 % (ref 37–47)
HCT VFR BLD CALC: 32.5 % (ref 37–47)
HCT VFR BLD CALC: 33.6 % (ref 37–47)
HCT VFR BLD CALC: 35.1 % (ref 37–47)
HCT VFR BLD CALC: 36.8 % (ref 37–47)
HDLC SERPL-MCNC: 35 MG/DL
HDLC SERPL-MCNC: 40 MG/DL
HDLC SERPL-MCNC: 41 MG/DL
HDLC SERPL-MCNC: 45 MG/DL
HEMOGLOBIN: 10.4 GM/DL (ref 12.5–16)
HEMOGLOBIN: 10.6 GM/DL (ref 12.5–16)
HEMOGLOBIN: 11.3 GM/DL (ref 12.5–16)
HEMOGLOBIN: 9 GM/DL (ref 12.5–16)
HEMOGLOBIN: 9.9 GM/DL (ref 12.5–16)
HYALINE CASTS: 10 /LPF
IMMATURE NEUTROPHIL %: 0.1 % (ref 0–0.43)
IMMATURE NEUTROPHIL %: 0.1 % (ref 0–0.43)
IMMATURE NEUTROPHIL %: 0.2 % (ref 0–0.43)
IMMATURE NEUTROPHIL %: 0.3 % (ref 0–0.43)
IMMATURE NEUTROPHIL %: 0.4 % (ref 0–0.43)
IRON: 103 UG/DL (ref 37–145)
KETONES, URINE: NEGATIVE MG/DL
LDL CHOLESTEROL DIRECT: 128 MG/DL
LDL CHOLESTEROL DIRECT: 170 MG/DL
LDL CHOLESTEROL DIRECT: 95 MG/DL
LDL CHOLESTEROL DIRECT: 97 MG/DL
LEUKOCYTE ESTERASE, URINE: ABNORMAL
LIPASE: 32 IU/L (ref 13–60)
LV EF: 55 %
LV EF: 70 %
LVEF MODALITY: NORMAL
LVEF MODALITY: NORMAL
LYMPHOCYTES ABSOLUTE: 1.9 K/CU MM
LYMPHOCYTES ABSOLUTE: 2.4 K/CU MM
LYMPHOCYTES ABSOLUTE: 3 K/CU MM
LYMPHOCYTES ABSOLUTE: 3.1 K/CU MM
LYMPHOCYTES ABSOLUTE: 3.3 K/CU MM
LYMPHOCYTES RELATIVE PERCENT: 22.8 % (ref 24–44)
LYMPHOCYTES RELATIVE PERCENT: 33.2 % (ref 24–44)
LYMPHOCYTES RELATIVE PERCENT: 34.5 % (ref 24–44)
LYMPHOCYTES RELATIVE PERCENT: 38.5 % (ref 24–44)
LYMPHOCYTES RELATIVE PERCENT: 43 % (ref 24–44)
Lab: ABNORMAL
Lab: ABNORMAL
MCH RBC QN AUTO: 28.6 PG (ref 27–31)
MCH RBC QN AUTO: 28.8 PG (ref 27–31)
MCH RBC QN AUTO: 29.2 PG (ref 27–31)
MCH RBC QN AUTO: 29.4 PG (ref 27–31)
MCH RBC QN AUTO: 29.7 PG (ref 27–31)
MCHC RBC AUTO-ENTMCNC: 30.2 % (ref 32–36)
MCHC RBC AUTO-ENTMCNC: 30.5 % (ref 32–36)
MCHC RBC AUTO-ENTMCNC: 30.7 % (ref 32–36)
MCHC RBC AUTO-ENTMCNC: 30.8 % (ref 32–36)
MCHC RBC AUTO-ENTMCNC: 31 % (ref 32–36)
MCV RBC AUTO: 93.2 FL (ref 78–100)
MCV RBC AUTO: 94.8 FL (ref 78–100)
MCV RBC AUTO: 94.9 FL (ref 78–100)
MCV RBC AUTO: 95.4 FL (ref 78–100)
MCV RBC AUTO: 97.6 FL (ref 78–100)
MICROALBUMIN/CREAT 24H UR: <1.2 MG/DL
MICROALBUMIN/CREAT UR-RTO: ABNORMAL MG/G CREAT (ref 0–30)
MONOCYTES ABSOLUTE: 0.5 K/CU MM
MONOCYTES ABSOLUTE: 0.5 K/CU MM
MONOCYTES ABSOLUTE: 0.6 K/CU MM
MONOCYTES ABSOLUTE: 0.6 K/CU MM
MONOCYTES ABSOLUTE: 0.7 K/CU MM
MONOCYTES RELATIVE PERCENT: 5.7 % (ref 0–4)
MONOCYTES RELATIVE PERCENT: 6.7 % (ref 0–4)
MONOCYTES RELATIVE PERCENT: 7.3 % (ref 0–4)
MONOCYTES RELATIVE PERCENT: 7.8 % (ref 0–4)
MONOCYTES RELATIVE PERCENT: 8.7 % (ref 0–4)
MUCUS: ABNORMAL HPF
MUCUS: ABNORMAL HPF
NITRITE URINE, QUANTITATIVE: NEGATIVE
NUCLEATED RBC %: 0 %
PCT TRANSFERRIN: 41 % (ref 10–44)
PDW BLD-RTO: 13.6 % (ref 11.7–14.9)
PDW BLD-RTO: 14.5 % (ref 11.7–14.9)
PDW BLD-RTO: 14.8 % (ref 11.7–14.9)
PDW BLD-RTO: 15.3 % (ref 11.7–14.9)
PDW BLD-RTO: 15.7 % (ref 11.7–14.9)
PH, URINE: 5 (ref 5–8)
PLATELET # BLD: 211 K/CU MM (ref 140–440)
PLATELET # BLD: 237 K/CU MM (ref 140–440)
PLATELET # BLD: 273 K/CU MM (ref 140–440)
PLATELET # BLD: 316 K/CU MM (ref 140–440)
PLATELET # BLD: 381 K/CU MM (ref 140–440)
PMV BLD AUTO: 10.3 FL (ref 7.5–11.1)
PMV BLD AUTO: 10.6 FL (ref 7.5–11.1)
PMV BLD AUTO: 10.8 FL (ref 7.5–11.1)
PMV BLD AUTO: 11 FL (ref 7.5–11.1)
PMV BLD AUTO: 11.7 FL (ref 7.5–11.1)
POTASSIUM SERPL-SCNC: 3.7 MMOL/L (ref 3.5–5.1)
POTASSIUM SERPL-SCNC: 3.8 MMOL/L (ref 3.5–5.1)
POTASSIUM SERPL-SCNC: 3.9 MMOL/L (ref 3.5–5.1)
POTASSIUM SERPL-SCNC: 4 MMOL/L (ref 3.5–5.1)
POTASSIUM SERPL-SCNC: 4.4 MMOL/L (ref 3.5–5.1)
POTASSIUM SERPL-SCNC: 4.4 MMOL/L (ref 3.5–5.1)
POTASSIUM SERPL-SCNC: 4.7 MMOL/L (ref 3.5–5.1)
PROTEIN UA: 30 MG/DL
PROTEIN UA: NEGATIVE MG/DL
PROTEIN UA: NEGATIVE MG/DL
RBC # BLD: 3.08 M/CU MM (ref 4.2–5.4)
RBC # BLD: 3.33 M/CU MM (ref 4.2–5.4)
RBC # BLD: 3.54 M/CU MM (ref 4.2–5.4)
RBC # BLD: 3.68 M/CU MM (ref 4.2–5.4)
RBC # BLD: 3.95 M/CU MM (ref 4.2–5.4)
RBC URINE: 6 /HPF (ref 0–6)
RBC URINE: <1 /HPF (ref 0–6)
RBC URINE: ABNORMAL /HPF (ref 0–6)
RETICULOCYTE COUNT PCT: 1.6 % (ref 0.2–2.2)
SEGMENTED NEUTROPHILS ABSOLUTE COUNT: 3.3 K/CU MM
SEGMENTED NEUTROPHILS ABSOLUTE COUNT: 4.1 K/CU MM
SEGMENTED NEUTROPHILS ABSOLUTE COUNT: 4.1 K/CU MM
SEGMENTED NEUTROPHILS ABSOLUTE COUNT: 5 K/CU MM
SEGMENTED NEUTROPHILS ABSOLUTE COUNT: 5.8 K/CU MM
SEGMENTED NEUTROPHILS RELATIVE PERCENT: 45.2 % (ref 36–66)
SEGMENTED NEUTROPHILS RELATIVE PERCENT: 48.5 % (ref 36–66)
SEGMENTED NEUTROPHILS RELATIVE PERCENT: 56.9 % (ref 36–66)
SEGMENTED NEUTROPHILS RELATIVE PERCENT: 57 % (ref 36–66)
SEGMENTED NEUTROPHILS RELATIVE PERCENT: 69.7 % (ref 36–66)
SODIUM BLD-SCNC: 130 MMOL/L (ref 135–145)
SODIUM BLD-SCNC: 132 MMOL/L (ref 135–145)
SODIUM BLD-SCNC: 134 MMOL/L (ref 135–145)
SODIUM BLD-SCNC: 137 MMOL/L (ref 135–145)
SODIUM BLD-SCNC: 140 MMOL/L (ref 135–145)
SODIUM BLD-SCNC: 141 MMOL/L (ref 135–145)
SODIUM BLD-SCNC: 141 MMOL/L (ref 135–145)
SPECIFIC GRAVITY UA: 1.03 (ref 1–1.03)
SPECIFIC GRAVITY UA: 1.03 (ref 1–1.03)
SPECIFIC GRAVITY UA: 1.04 (ref 1–1.03)
SPECIFIC GRAVITY UA: ABNORMAL (ref 1–1.03)
SPECIMEN: ABNORMAL
SPECIMEN: ABNORMAL
SPEP INTERPRETATION: NORMAL
SQUAMOUS EPITHELIAL: 23 /HPF
SQUAMOUS EPITHELIAL: 30 /HPF
SQUAMOUS EPITHELIAL: 4 /HPF
T4 FREE: 1.06 NG/DL (ref 0.9–1.8)
TOTAL CK: 103 IU/L (ref 26–140)
TOTAL CK: 64 IU/L (ref 26–140)
TOTAL CK: 74 IU/L (ref 26–140)
TOTAL COLONY COUNT: ABNORMAL
TOTAL COLONY COUNT: ABNORMAL
TOTAL IMMATURE NEUTOROPHIL: 0.01 K/CU MM
TOTAL IMMATURE NEUTOROPHIL: 0.01 K/CU MM
TOTAL IMMATURE NEUTOROPHIL: 0.02 K/CU MM
TOTAL IMMATURE NEUTOROPHIL: 0.02 K/CU MM
TOTAL IMMATURE NEUTOROPHIL: 0.03 K/CU MM
TOTAL IRON BINDING CAPACITY: 253 UG/DL (ref 250–450)
TOTAL NUCLEATED RBC: 0 K/CU MM
TOTAL PROTEIN: 6.2 GM/DL (ref 6.4–8.2)
TOTAL PROTEIN: 7.2 GM/DL (ref 6.4–8.2)
TOTAL PROTEIN: 7.4 GM/DL (ref 6.4–8.2)
TOTAL PROTEIN: 7.4 GM/DL (ref 6.4–8.2)
TOTAL PROTEIN: 7.5 GM/DL (ref 6.4–8.2)
TOTAL PROTEIN: 7.5 GM/DL (ref 6.4–8.2)
TOTAL PROTEIN: 8 GM/DL (ref 6.4–8.2)
TRANSFERRIN: 207.2 MG/DL (ref 200–360)
TRANSITIONAL EPITHELIAL: 1 /HPF
TRANSITIONAL EPITHELIAL: <1 /HPF
TRICHOMONAS: ABNORMAL /HPF
TRIGL SERPL-MCNC: 116 MG/DL
TRIGL SERPL-MCNC: 139 MG/DL
TRIGL SERPL-MCNC: 141 MG/DL
TRIGLYCERIDE, FASTING: 131 MG/DL
TROPONIN T: <0.01 NG/ML
TSH HIGH SENSITIVITY: 1.18 UIU/ML (ref 0.27–4.2)
UNSATURATED IRON BINDING CAPACITY: 150 UG/DL (ref 110–370)
URINE TOTAL PROTEIN: 22.2 MG/DL
UROBILINOGEN, URINE: NORMAL MG/DL (ref 0.2–1)
VITAMIN B-12: 267.7 PG/ML (ref 211–911)
WBC # BLD: 7.2 K/CU MM (ref 4–10.5)
WBC # BLD: 7.2 K/CU MM (ref 4–10.5)
WBC # BLD: 8.4 K/CU MM (ref 4–10.5)
WBC # BLD: 8.5 K/CU MM (ref 4–10.5)
WBC # BLD: 8.8 K/CU MM (ref 4–10.5)
WBC UA: 13 /HPF (ref 0–5)
WBC UA: 143 /HPF (ref 0–5)
WBC UA: 27 /HPF (ref 0–5)

## 2019-01-01 PROCEDURE — 73560 X-RAY EXAM OF KNEE 1 OR 2: CPT

## 2019-01-01 PROCEDURE — 3430000000 HC RX DIAGNOSTIC RADIOPHARMACEUTICAL: Performed by: INTERNAL MEDICINE

## 2019-01-01 PROCEDURE — 84443 ASSAY THYROID STIM HORMONE: CPT

## 2019-01-01 PROCEDURE — 3700000001 HC ADD 15 MINUTES (ANESTHESIA): Performed by: SPECIALIST

## 2019-01-01 PROCEDURE — 82746 ASSAY OF FOLIC ACID SERUM: CPT

## 2019-01-01 PROCEDURE — 83721 ASSAY OF BLOOD LIPOPROTEIN: CPT

## 2019-01-01 PROCEDURE — 80053 COMPREHEN METABOLIC PANEL: CPT

## 2019-01-01 PROCEDURE — 96372 THER/PROPH/DIAG INJ SC/IM: CPT

## 2019-01-01 PROCEDURE — 2580000003 HC RX 258: Performed by: INTERNAL MEDICINE

## 2019-01-01 PROCEDURE — 84156 ASSAY OF PROTEIN URINE: CPT

## 2019-01-01 PROCEDURE — 6370000000 HC RX 637 (ALT 250 FOR IP): Performed by: INTERNAL MEDICINE

## 2019-01-01 PROCEDURE — 82248 BILIRUBIN DIRECT: CPT

## 2019-01-01 PROCEDURE — 96361 HYDRATE IV INFUSION ADD-ON: CPT

## 2019-01-01 PROCEDURE — 87086 URINE CULTURE/COLONY COUNT: CPT

## 2019-01-01 PROCEDURE — 6370000000 HC RX 637 (ALT 250 FOR IP): Performed by: SPECIALIST

## 2019-01-01 PROCEDURE — 73565 X-RAY EXAM OF KNEES: CPT

## 2019-01-01 PROCEDURE — 6360000002 HC RX W HCPCS: Performed by: INTERNAL MEDICINE

## 2019-01-01 PROCEDURE — 2580000003 HC RX 258: Performed by: EMERGENCY MEDICINE

## 2019-01-01 PROCEDURE — 84439 ASSAY OF FREE THYROXINE: CPT

## 2019-01-01 PROCEDURE — 83036 HEMOGLOBIN GLYCOSYLATED A1C: CPT

## 2019-01-01 PROCEDURE — 82728 ASSAY OF FERRITIN: CPT

## 2019-01-01 PROCEDURE — 2709999900 HC NON-CHARGEABLE SUPPLY: Performed by: SPECIALIST

## 2019-01-01 PROCEDURE — 74177 CT ABD & PELVIS W/CONTRAST: CPT

## 2019-01-01 PROCEDURE — G0378 HOSPITAL OBSERVATION PER HR: HCPCS

## 2019-01-01 PROCEDURE — 82607 VITAMIN B-12: CPT

## 2019-01-01 PROCEDURE — 87077 CULTURE AEROBIC IDENTIFY: CPT

## 2019-01-01 PROCEDURE — 84166 PROTEIN E-PHORESIS/URINE/CSF: CPT

## 2019-01-01 PROCEDURE — 6360000004 HC RX CONTRAST MEDICATION: Performed by: EMERGENCY MEDICINE

## 2019-01-01 PROCEDURE — 80061 LIPID PANEL: CPT

## 2019-01-01 PROCEDURE — 3700000000 HC ANESTHESIA ATTENDED CARE: Performed by: SPECIALIST

## 2019-01-01 PROCEDURE — 99285 EMERGENCY DEPT VISIT HI MDM: CPT

## 2019-01-01 PROCEDURE — 82550 ASSAY OF CK (CPK): CPT

## 2019-01-01 PROCEDURE — 84165 PROTEIN E-PHORESIS SERUM: CPT

## 2019-01-01 PROCEDURE — 36415 COLL VENOUS BLD VENIPUNCTURE: CPT

## 2019-01-01 PROCEDURE — A9500 TC99M SESTAMIBI: HCPCS | Performed by: INTERNAL MEDICINE

## 2019-01-01 PROCEDURE — 85025 COMPLETE CBC W/AUTO DIFF WBC: CPT

## 2019-01-01 PROCEDURE — 93017 CV STRESS TEST TRACING ONLY: CPT

## 2019-01-01 PROCEDURE — 3609017100 HC EGD: Performed by: SPECIALIST

## 2019-01-01 PROCEDURE — 82962 GLUCOSE BLOOD TEST: CPT

## 2019-01-01 PROCEDURE — 78452 HT MUSCLE IMAGE SPECT MULT: CPT

## 2019-01-01 PROCEDURE — 80048 BASIC METABOLIC PNL TOTAL CA: CPT

## 2019-01-01 PROCEDURE — 85045 AUTOMATED RETICULOCYTE COUNT: CPT

## 2019-01-01 PROCEDURE — 99220 PR INITIAL OBSERVATION CARE/DAY 70 MINUTES: CPT | Performed by: INTERNAL MEDICINE

## 2019-01-01 PROCEDURE — 3609012400 HC EGD TRANSORAL BIOPSY SINGLE/MULTIPLE: Performed by: SPECIALIST

## 2019-01-01 PROCEDURE — 86320 SERUM IMMUNOELECTROPHORESIS: CPT

## 2019-01-01 PROCEDURE — 85652 RBC SED RATE AUTOMATED: CPT

## 2019-01-01 PROCEDURE — 6360000002 HC RX W HCPCS: Performed by: EMERGENCY MEDICINE

## 2019-01-01 PROCEDURE — 95910 NRV CNDJ TEST 7-8 STUDIES: CPT | Performed by: PHYSICAL MEDICINE & REHABILITATION

## 2019-01-01 PROCEDURE — 86140 C-REACTIVE PROTEIN: CPT

## 2019-01-01 PROCEDURE — 81001 URINALYSIS AUTO W/SCOPE: CPT

## 2019-01-01 PROCEDURE — 82570 ASSAY OF URINE CREATININE: CPT

## 2019-01-01 PROCEDURE — 82043 UR ALBUMIN QUANTITATIVE: CPT

## 2019-01-01 PROCEDURE — 93005 ELECTROCARDIOGRAM TRACING: CPT | Performed by: EMERGENCY MEDICINE

## 2019-01-01 PROCEDURE — 93010 ELECTROCARDIOGRAM REPORT: CPT | Performed by: INTERNAL MEDICINE

## 2019-01-01 PROCEDURE — 77067 SCR MAMMO BI INCL CAD: CPT

## 2019-01-01 PROCEDURE — 96374 THER/PROPH/DIAG INJ IV PUSH: CPT

## 2019-01-01 PROCEDURE — 95886 MUSC TEST DONE W/N TEST COMP: CPT | Performed by: PHYSICAL MEDICINE & REHABILITATION

## 2019-01-01 PROCEDURE — 96376 TX/PRO/DX INJ SAME DRUG ADON: CPT

## 2019-01-01 PROCEDURE — 6360000002 HC RX W HCPCS: Performed by: NURSE ANESTHETIST, CERTIFIED REGISTERED

## 2019-01-01 PROCEDURE — 2500000003 HC RX 250 WO HCPCS: Performed by: NURSE ANESTHETIST, CERTIFIED REGISTERED

## 2019-01-01 PROCEDURE — 84466 ASSAY OF TRANSFERRIN: CPT

## 2019-01-01 PROCEDURE — 84484 ASSAY OF TROPONIN QUANT: CPT

## 2019-01-01 PROCEDURE — 96375 TX/PRO/DX INJ NEW DRUG ADDON: CPT

## 2019-01-01 PROCEDURE — 93306 TTE W/DOPPLER COMPLETE: CPT

## 2019-01-01 PROCEDURE — 86038 ANTINUCLEAR ANTIBODIES: CPT

## 2019-01-01 PROCEDURE — 83540 ASSAY OF IRON: CPT

## 2019-01-01 PROCEDURE — 83690 ASSAY OF LIPASE: CPT

## 2019-01-01 PROCEDURE — 94761 N-INVAS EAR/PLS OXIMETRY MLT: CPT

## 2019-01-01 PROCEDURE — 87186 SC STD MICRODIL/AGAR DIL: CPT

## 2019-01-01 PROCEDURE — 74176 CT ABD & PELVIS W/O CONTRAST: CPT

## 2019-01-01 PROCEDURE — 99239 HOSP IP/OBS DSCHRG MGMT >30: CPT | Performed by: INTERNAL MEDICINE

## 2019-01-01 RX ORDER — 0.9 % SODIUM CHLORIDE 0.9 %
500 INTRAVENOUS SOLUTION INTRAVENOUS ONCE
Status: COMPLETED | OUTPATIENT
Start: 2019-01-01 | End: 2019-01-01

## 2019-01-01 RX ORDER — LIDOCAINE HYDROCHLORIDE 20 MG/ML
INJECTION, SOLUTION EPIDURAL; INFILTRATION; INTRACAUDAL; PERINEURAL PRN
Status: DISCONTINUED | OUTPATIENT
Start: 2019-01-01 | End: 2019-01-01 | Stop reason: SDUPTHER

## 2019-01-01 RX ORDER — PROMETHAZINE HYDROCHLORIDE 25 MG/ML
25 INJECTION, SOLUTION INTRAMUSCULAR; INTRAVENOUS ONCE
Status: COMPLETED | OUTPATIENT
Start: 2019-01-01 | End: 2019-01-01

## 2019-01-01 RX ORDER — ONDANSETRON 2 MG/ML
4 INJECTION INTRAMUSCULAR; INTRAVENOUS EVERY 6 HOURS PRN
Status: DISCONTINUED | OUTPATIENT
Start: 2019-01-01 | End: 2019-01-01 | Stop reason: HOSPADM

## 2019-01-01 RX ORDER — MORPHINE SULFATE 2 MG/ML
1 INJECTION, SOLUTION INTRAMUSCULAR; INTRAVENOUS EVERY 4 HOURS PRN
Status: DISCONTINUED | OUTPATIENT
Start: 2019-01-01 | End: 2019-01-01 | Stop reason: HOSPADM

## 2019-01-01 RX ORDER — ATORVASTATIN CALCIUM 40 MG/1
80 TABLET, FILM COATED ORAL DAILY
Status: DISCONTINUED | OUTPATIENT
Start: 2019-01-01 | End: 2019-01-01 | Stop reason: HOSPADM

## 2019-01-01 RX ORDER — DEXTROSE MONOHYDRATE 50 MG/ML
100 INJECTION, SOLUTION INTRAVENOUS PRN
Status: DISCONTINUED | OUTPATIENT
Start: 2019-01-01 | End: 2019-01-01 | Stop reason: HOSPADM

## 2019-01-01 RX ORDER — LISINOPRIL 2.5 MG/1
2.5 TABLET ORAL DAILY
Status: DISCONTINUED | OUTPATIENT
Start: 2019-01-01 | End: 2019-01-01 | Stop reason: HOSPADM

## 2019-01-01 RX ORDER — NICOTINE POLACRILEX 4 MG
15 LOZENGE BUCCAL PRN
Status: DISCONTINUED | OUTPATIENT
Start: 2019-01-01 | End: 2019-01-01 | Stop reason: HOSPADM

## 2019-01-01 RX ORDER — SODIUM CHLORIDE 0.9 % (FLUSH) 0.9 %
10 SYRINGE (ML) INJECTION EVERY 12 HOURS SCHEDULED
Status: DISCONTINUED | OUTPATIENT
Start: 2019-01-01 | End: 2019-01-01 | Stop reason: HOSPADM

## 2019-01-01 RX ORDER — METOPROLOL TARTRATE 50 MG/1
100 TABLET, FILM COATED ORAL 2 TIMES DAILY
Status: DISCONTINUED | OUTPATIENT
Start: 2019-01-01 | End: 2019-01-01 | Stop reason: HOSPADM

## 2019-01-01 RX ORDER — DEXTROSE MONOHYDRATE 25 G/50ML
12.5 INJECTION, SOLUTION INTRAVENOUS PRN
Status: DISCONTINUED | OUTPATIENT
Start: 2019-01-01 | End: 2019-01-01 | Stop reason: HOSPADM

## 2019-01-01 RX ORDER — MORPHINE SULFATE 4 MG/ML
4 INJECTION, SOLUTION INTRAMUSCULAR; INTRAVENOUS EVERY 30 MIN PRN
Status: DISCONTINUED | OUTPATIENT
Start: 2019-01-01 | End: 2019-01-01

## 2019-01-01 RX ORDER — SUCRALFATE 1 G/1
1 TABLET ORAL
Status: DISCONTINUED | OUTPATIENT
Start: 2019-01-01 | End: 2019-01-01 | Stop reason: HOSPADM

## 2019-01-01 RX ORDER — PANTOPRAZOLE SODIUM 40 MG/1
40 TABLET, DELAYED RELEASE ORAL
Status: DISCONTINUED | OUTPATIENT
Start: 2019-01-01 | End: 2019-01-01 | Stop reason: HOSPADM

## 2019-01-01 RX ORDER — ONDANSETRON 2 MG/ML
4 INJECTION INTRAMUSCULAR; INTRAVENOUS EVERY 6 HOURS PRN
Status: DISCONTINUED | OUTPATIENT
Start: 2019-01-01 | End: 2019-01-01

## 2019-01-01 RX ORDER — PANTOPRAZOLE SODIUM 40 MG/1
40 TABLET, DELAYED RELEASE ORAL
Qty: 30 TABLET | Refills: 3 | Status: ON HOLD | OUTPATIENT
Start: 2019-01-01 | End: 2020-01-01

## 2019-01-01 RX ORDER — CEPHALEXIN 250 MG/1
250 CAPSULE ORAL 3 TIMES DAILY
Qty: 21 CAPSULE | Refills: 0 | Status: SHIPPED | OUTPATIENT
Start: 2019-01-01 | End: 2020-01-01

## 2019-01-01 RX ORDER — SODIUM CHLORIDE 0.9 % (FLUSH) 0.9 %
10 SYRINGE (ML) INJECTION PRN
Status: DISCONTINUED | OUTPATIENT
Start: 2019-01-01 | End: 2019-01-01 | Stop reason: HOSPADM

## 2019-01-01 RX ORDER — PROMETHAZINE HYDROCHLORIDE 25 MG/1
25 TABLET ORAL EVERY 6 HOURS PRN
Status: DISCONTINUED | OUTPATIENT
Start: 2019-01-01 | End: 2019-01-01 | Stop reason: HOSPADM

## 2019-01-01 RX ORDER — 0.9 % SODIUM CHLORIDE 0.9 %
10 VIAL (ML) INJECTION
Status: COMPLETED | OUTPATIENT
Start: 2019-01-01 | End: 2019-01-01

## 2019-01-01 RX ORDER — GEMFIBROZIL 600 MG/1
600 TABLET, FILM COATED ORAL 2 TIMES DAILY
Status: DISCONTINUED | OUTPATIENT
Start: 2019-01-01 | End: 2019-01-01 | Stop reason: HOSPADM

## 2019-01-01 RX ORDER — PROPOFOL 10 MG/ML
INJECTION, EMULSION INTRAVENOUS PRN
Status: DISCONTINUED | OUTPATIENT
Start: 2019-01-01 | End: 2019-01-01 | Stop reason: SDUPTHER

## 2019-01-01 RX ORDER — SODIUM CHLORIDE 9 MG/ML
INJECTION, SOLUTION INTRAVENOUS CONTINUOUS
Status: DISCONTINUED | OUTPATIENT
Start: 2019-01-01 | End: 2019-01-01

## 2019-01-01 RX ADMIN — ENOXAPARIN SODIUM 30 MG: 30 INJECTION SUBCUTANEOUS at 10:08

## 2019-01-01 RX ADMIN — SODIUM CHLORIDE, PRESERVATIVE FREE 10 ML: 5 INJECTION INTRAVENOUS at 23:17

## 2019-01-01 RX ADMIN — PHENYLEPHRINE HYDROCHLORIDE 150 MCG: 10 INJECTION INTRAVENOUS at 08:02

## 2019-01-01 RX ADMIN — GEMFIBROZIL 600 MG: 600 TABLET ORAL at 23:49

## 2019-01-01 RX ADMIN — PROPOFOL 180 MG: 10 INJECTION, EMULSION INTRAVENOUS at 07:53

## 2019-01-01 RX ADMIN — GEMFIBROZIL 600 MG: 600 TABLET ORAL at 10:09

## 2019-01-01 RX ADMIN — ATORVASTATIN CALCIUM 80 MG: 40 TABLET, FILM COATED ORAL at 08:41

## 2019-01-01 RX ADMIN — SUCRALFATE 1 G: 1 TABLET ORAL at 17:14

## 2019-01-01 RX ADMIN — LISINOPRIL 2.5 MG: 2.5 TABLET ORAL at 08:41

## 2019-01-01 RX ADMIN — ENOXAPARIN SODIUM 30 MG: 30 INJECTION SUBCUTANEOUS at 08:41

## 2019-01-01 RX ADMIN — METOPROLOL TARTRATE 100 MG: 50 TABLET, FILM COATED ORAL at 10:09

## 2019-01-01 RX ADMIN — METOPROLOL TARTRATE 100 MG: 50 TABLET, FILM COATED ORAL at 23:49

## 2019-01-01 RX ADMIN — METOPROLOL TARTRATE 100 MG: 50 TABLET, FILM COATED ORAL at 08:15

## 2019-01-01 RX ADMIN — SUCRALFATE 1 G: 1 TABLET ORAL at 16:05

## 2019-01-01 RX ADMIN — ONDANSETRON 4 MG: 2 INJECTION INTRAMUSCULAR; INTRAVENOUS at 04:51

## 2019-01-01 RX ADMIN — PROMETHAZINE HYDROCHLORIDE 25 MG: 25 INJECTION INTRAMUSCULAR; INTRAVENOUS at 06:09

## 2019-01-01 RX ADMIN — LISINOPRIL 2.5 MG: 2.5 TABLET ORAL at 10:09

## 2019-01-01 RX ADMIN — SODIUM CHLORIDE: 9 INJECTION, SOLUTION INTRAVENOUS at 00:51

## 2019-01-01 RX ADMIN — GEMFIBROZIL 600 MG: 600 TABLET ORAL at 20:55

## 2019-01-01 RX ADMIN — ENOXAPARIN SODIUM 30 MG: 30 INJECTION SUBCUTANEOUS at 08:15

## 2019-01-01 RX ADMIN — SUCRALFATE 1 G: 1 TABLET ORAL at 08:15

## 2019-01-01 RX ADMIN — IOPAMIDOL 75 ML: 755 INJECTION, SOLUTION INTRAVENOUS at 04:08

## 2019-01-01 RX ADMIN — GEMFIBROZIL 600 MG: 600 TABLET ORAL at 08:41

## 2019-01-01 RX ADMIN — Medication 10 ML: at 04:08

## 2019-01-01 RX ADMIN — SUCRALFATE 1 G: 1 TABLET ORAL at 11:49

## 2019-01-01 RX ADMIN — MORPHINE SULFATE 1 MG: 2 INJECTION, SOLUTION INTRAMUSCULAR; INTRAVENOUS at 00:59

## 2019-01-01 RX ADMIN — SUCRALFATE 1 G: 1 TABLET ORAL at 06:04

## 2019-01-01 RX ADMIN — REGADENOSON 0.4 MG: 0.08 INJECTION, SOLUTION INTRAVENOUS at 10:06

## 2019-01-01 RX ADMIN — PANTOPRAZOLE SODIUM 40 MG: 40 TABLET, DELAYED RELEASE ORAL at 06:04

## 2019-01-01 RX ADMIN — METOPROLOL TARTRATE 100 MG: 50 TABLET, FILM COATED ORAL at 08:41

## 2019-01-01 RX ADMIN — ATORVASTATIN CALCIUM 80 MG: 40 TABLET, FILM COATED ORAL at 08:15

## 2019-01-01 RX ADMIN — GEMFIBROZIL 600 MG: 600 TABLET ORAL at 20:31

## 2019-01-01 RX ADMIN — SODIUM CHLORIDE: 9 INJECTION, SOLUTION INTRAVENOUS at 06:44

## 2019-01-01 RX ADMIN — LIDOCAINE HYDROCHLORIDE 120 MG: 20 INJECTION, SOLUTION EPIDURAL; INFILTRATION; INTRACAUDAL; PERINEURAL at 07:53

## 2019-01-01 RX ADMIN — MORPHINE SULFATE 4 MG: 4 INJECTION, SOLUTION INTRAMUSCULAR; INTRAVENOUS at 04:51

## 2019-01-01 RX ADMIN — PANTOPRAZOLE SODIUM 40 MG: 40 TABLET, DELAYED RELEASE ORAL at 10:09

## 2019-01-01 RX ADMIN — Medication 30 MILLICURIE: at 10:10

## 2019-01-01 RX ADMIN — LISINOPRIL 2.5 MG: 2.5 TABLET ORAL at 08:15

## 2019-01-01 RX ADMIN — PROMETHAZINE HYDROCHLORIDE 25 MG: 25 TABLET ORAL at 10:09

## 2019-01-01 RX ADMIN — SUCRALFATE 1 G: 1 TABLET ORAL at 20:31

## 2019-01-01 RX ADMIN — GEMFIBROZIL 600 MG: 600 TABLET ORAL at 08:15

## 2019-01-01 RX ADMIN — SODIUM CHLORIDE 500 ML: 9 INJECTION, SOLUTION INTRAVENOUS at 04:52

## 2019-01-01 RX ADMIN — SUCRALFATE 1 G: 1 TABLET ORAL at 20:55

## 2019-01-01 RX ADMIN — SODIUM CHLORIDE: 9 INJECTION, SOLUTION INTRAVENOUS at 08:14

## 2019-01-01 RX ADMIN — SUCRALFATE 1 G: 1 TABLET ORAL at 23:49

## 2019-01-01 RX ADMIN — METOPROLOL TARTRATE 100 MG: 50 TABLET, FILM COATED ORAL at 20:30

## 2019-01-01 RX ADMIN — ATORVASTATIN CALCIUM 80 MG: 40 TABLET, FILM COATED ORAL at 10:09

## 2019-01-01 RX ADMIN — SUCRALFATE 1 G: 1 TABLET ORAL at 11:43

## 2019-01-01 RX ADMIN — Medication 10 MILLICURIE: at 09:00

## 2019-01-01 RX ADMIN — SODIUM CHLORIDE 500 ML: 9 INJECTION, SOLUTION INTRAVENOUS at 05:59

## 2019-01-01 RX ADMIN — SUCRALFATE 1 G: 1 TABLET ORAL at 10:09

## 2019-01-01 RX ADMIN — SODIUM CHLORIDE, PRESERVATIVE FREE 10 ML: 5 INJECTION INTRAVENOUS at 20:55

## 2019-01-01 RX ADMIN — METOPROLOL TARTRATE 100 MG: 50 TABLET, FILM COATED ORAL at 20:55

## 2019-01-01 RX ADMIN — SUCRALFATE 1 G: 1 TABLET ORAL at 17:59

## 2019-01-01 ASSESSMENT — PAIN SCALES - GENERAL
PAINLEVEL_OUTOF10: 0
PAINLEVEL_OUTOF10: 2
PAINLEVEL_OUTOF10: 5
PAINLEVEL_OUTOF10: 3
PAINLEVEL_OUTOF10: 4
PAINLEVEL_OUTOF10: 3
PAINLEVEL_OUTOF10: 0
PAINLEVEL_OUTOF10: 6
PAINLEVEL_OUTOF10: 0

## 2019-01-01 ASSESSMENT — ENCOUNTER SYMPTOMS
DIARRHEA: 0
VOMITING: 1
RECTAL PAIN: 0
ABDOMINAL PAIN: 1
NAUSEA: 1
TROUBLE SWALLOWING: 0
WHEEZING: 0
SHORTNESS OF BREATH: 1
EYE PAIN: 0
ABDOMINAL DISTENTION: 0
STRIDOR: 0
SHORTNESS OF BREATH: 0
VOICE CHANGE: 0
BACK PAIN: 0
BLOOD IN STOOL: 0
ANAL BLEEDING: 0
CONSTIPATION: 0

## 2019-01-01 ASSESSMENT — PAIN DESCRIPTION - PAIN TYPE
TYPE: ACUTE PAIN

## 2019-01-01 ASSESSMENT — PAIN DESCRIPTION - ONSET: ONSET: PROGRESSIVE

## 2019-01-01 ASSESSMENT — PAIN DESCRIPTION - ORIENTATION
ORIENTATION: LOWER
ORIENTATION: LOWER

## 2019-01-01 ASSESSMENT — PAIN DESCRIPTION - LOCATION
LOCATION: ABDOMEN

## 2019-01-01 ASSESSMENT — PAIN DESCRIPTION - DESCRIPTORS: DESCRIPTORS: DISCOMFORT

## 2019-01-01 ASSESSMENT — PAIN DESCRIPTION - PROGRESSION: CLINICAL_PROGRESSION: GRADUALLY WORSENING

## 2019-01-01 ASSESSMENT — PAIN DESCRIPTION - FREQUENCY: FREQUENCY: CONTINUOUS

## 2019-03-18 ENCOUNTER — HOSPITAL ENCOUNTER (OUTPATIENT)
Age: 79
Discharge: HOME | End: 2019-03-18
Payer: COMMERCIAL

## 2019-03-18 LAB
ESTIMATED AVERAGE GLUCOSE: 134 MG/DL
HBA1C MFR BLD: 6.3 % (ref 4.2–6.3)

## 2019-03-18 PROCEDURE — 36415 COLL VENOUS BLD VENIPUNCTURE: CPT

## 2019-03-18 PROCEDURE — 83036 HEMOGLOBIN GLYCOSYLATED A1C: CPT

## 2019-03-27 ENCOUNTER — HOSPITAL ENCOUNTER (OUTPATIENT)
Age: 79
Discharge: HOME | End: 2019-03-27
Payer: COMMERCIAL

## 2019-03-27 ENCOUNTER — HOSPITAL ENCOUNTER (OUTPATIENT)
Dept: GENERAL RADIOLOGY | Age: 79
Discharge: HOME | End: 2019-03-27
Payer: COMMERCIAL

## 2019-03-27 DIAGNOSIS — M19.031 PRIMARY OSTEOARTHRITIS, RIGHT WRIST: ICD-10-CM

## 2019-03-27 PROCEDURE — 73110 X-RAY EXAM OF WRIST: CPT

## 2019-12-25 PROBLEM — N17.9 AKI (ACUTE KIDNEY INJURY) (HCC): Status: ACTIVE | Noted: 2019-01-01

## 2019-12-25 PROBLEM — R11.2 INTRACTABLE NAUSEA AND VOMITING: Status: ACTIVE | Noted: 2019-01-01

## 2019-12-25 PROBLEM — R10.30 LOWER ABDOMINAL PAIN: Status: ACTIVE | Noted: 2019-01-01

## 2019-12-25 NOTE — ED PROVIDER NOTES
Emergency Department Encounter  Location: [address] [place] [address] EMERGENCY DEPARTMENT    Patient: Amaris Yoder  MRN: [id number]  : 1940  Date of evaluation: 2019  ED Provider: Alvino Banegas DO    Amaris Yoder was checked out to me by Dr. Yao Mehta. Please see his/her initial documentation for details of the patient's initial ED presentation, physical exam and completed studies. In brief, Amaris Yoder is a 67 y.o. female that presented to the emergency department With 5 days of constant nausea, poor oral intake secondary to this, abdominal pain. No measured fevers or chills. She denies any urinary symptoms but states she was recently diagnosed with urinary tract infection per PCP and has been on an antibiotic. She can't recall the name. Denies chest discomfort, shortness of breath or back pain.     I have reviewed and interpreted all of the currently available lab results and diagnostics from this visit:  Results for orders placed or performed during the hospital encounter of 19   CBC Auto Differential   Result Value Ref Range    WBC 8.4 4.0 - 10.5 K/CU MM    RBC 3.33 (L) 4.2 - 5.4 M/CU MM    Hemoglobin 9.9 (L) 12.5 - 16.0 GM/DL    Hematocrit 32.5 (L) 37 - 47 %    MCV 97.6 78 - 100 FL    MCH 29.7 27 - 31 PG    MCHC 30.5 (L) 32.0 - 36.0 %    RDW 14.8 11.7 - 14.9 %    Platelets 876 516 - 191 K/CU MM    MPV 10.3 7.5 - 11.1 FL    Differential Type AUTOMATED DIFFERENTIAL     Segs Relative 69.7 (H) 36 - 66 %    Lymphocytes % 22.8 (L) 24 - 44 %    Monocytes % 5.7 (H) 0 - 4 %    Eosinophils % 1.4 0 - 3 %    Basophils % 0.2 0 - 1 %    Segs Absolute 5.8 K/CU MM    Lymphocytes Absolute 1.9 K/CU MM    Monocytes Absolute 0.5 K/CU MM    Eosinophils Absolute 0.1 K/CU MM    Basophils Absolute 0.0 K/CU MM    Nucleated RBC % 0.0 %    Total Nucleated RBC 0.0 K/CU MM    Total Immature Neutrophil 0.02 K/CU MM    Immature Neutrophil % 0.2 0 - 0.43 %   Basic Metabolic Panel w/ Reflex to MG

## 2019-12-25 NOTE — CONSULTS
Department of Internal Medicine  Gastroenterology Consult Note  Desire Lou. Les JONES      Reason for Consult:  Nausea and vomiting    Primary Care Physician:  Bennie Morales MD    History Obtained From:  patient, family member - daughter    HISTORY OF PRESENT ILLNESS:              The patient is a 67 y.o.  female admitted with refractory nausea and vomiting for the past several weeks along with a 25 lb weight loss according to her. She has seen Dr Kristin Rajan in the past and I am covering for him this week. He did a colonoscopy a couple of years ago according to the patient and an EGD many tears ago. She has had a non-specific intermittent pain in he lower abd but has had this for years. She also has chronic constipation. She denies melena, hematochezia, hematemesis.  She has had a fundoplication and cholecystectomy in the past    Past Medical History:        Diagnosis Date    Acid reflux     Arthritis     \"Back\"    Back pain     \"Back Hurts Sometimes\"    CAD (coronary artery disease)     Sees Dr. Zuñiga Race Diabetes mellitus [facility]) Dx 2000's    \"Borderline\"    Hiatal hernia     Hx of blood clots 2001    \"Blood Clots Legs After Heart Surgery\"    Hx of migraines     HX OTHER MEDICAL     Primary Care Physician Is Dr. Rusty Gilliam Hyperlipidemia     Hypertension     IBS (irritable bowel syndrome)     Right Breast Cancer 2005 Or 2006     Right Breast Mastectomy    Shortness of breath on exertion     UTI (urinary tract infection) In Past    No Current Symptoms    Wears dentures     Full Upper    Wears glasses     To Read       Past Surgical History:        Procedure Laterality Date    APPENDECTOMY  Unsure When    BREAST SURGERY Right 2005 Or 2006    Right Mastectomy For Breast Cancer    CARDIAC SURGERY  2001    CABG (Two Bypasses)    CHOLECYSTECTOMY, LAPAROSCOPIC  1990's Or 2000's    COLONOSCOPY  \"Two\" Last Done 2000's    Polyps Removed In Past    DENTAL SURGERY      All Upper Teeth Extracted In Past    DILATATION, ESOPHAGUS  \"3 Or 4\" Last Done 7-14    EYE SURGERY Bilateral 2000's    Cataracts With Lens Implants    GASTRIC FUNDOPLICATION  2/4/40    Robotic asssited laprascopic nissen fundoplication    HERNIA REPAIR  [address]    Umbilical Hernia Repair    HIATAL HERNIA REPAIR  2014    INGUINAL HERNIA REPAIR Right 1980's Or 1990's    TONSILLECTOMY  1970's    TUBAL LIGATION  1965    WISDOM TOOTH EXTRACTION      Three Removed In Past       Medications Prior to Admission:    Prior to Admission medications    Medication Sig Start Date End Date Taking? Authorizing Provider   triamterene-hydrochlorothiazide (MAXZIDE-25) 37.5-25 MG per tablet TAKE 1 TABLET BY MOUTH ONE TIME A DAY  2/13/17  Yes Mark García MD   ondansetron (ZOFRAN ODT) 4 MG disintegrating tablet Take 1 tablet by mouth every 8 hours as needed for Nausea 1/15/17  Yes Chalo Carcamo,    HYDROcodone-acetaminophen (NORCO) 5-325 MG per tablet Take 1-2 tablets by mouth every 6 hours as needed for Pain . 1/15/17  Yes Eliel Julian,    atorvastatin (LIPITOR) 80 MG tablet TAKE 1 TABLET BY MOUTH ONE TIME A DAY  1/5/17  Yes Mark García MD   lisinopril (PRINIVIL;ZESTRIL) 2.5 MG tablet TAKE 1 TABLET BY MOUTH ONE TIME A DAY  1/5/17  Yes Mark García MD   sucralfate (CARAFATE) 1 GM tablet Take 1 g by mouth 4 times daily. Yes Historical Provider, MD   metoprolol (LOPRESSOR) 100 MG tablet Take 100 mg by mouth 2 times daily. Yes Historical Provider, MD   metformin (GLUCOPHAGE) 500 MG tablet Take 500 mg by mouth 2 times daily (with meals). Yes Historical Provider, MD   triamterene-hydrochlorothiazide (DYAZIDE) 37.5-25 MG per capsule Take 1 capsule by mouth every morning. Yes Historical Provider, MD   gemfibrozil (LOPID) 600 MG tablet Take 600 mg by mouth 2 times daily. Yes Historical Provider, MD       Allergies:  No Known Allergies.     Social History:    TOBACCO:  No  ETOH:  No    Family History:   Family History Problem Relation Age of Onset    Heart Disease Mother         AAA    Early Death Father 28        Heart Attack    Heart Disease Father         Heart Attack     Early Death Brother         In His 42's Or 52's    Early Death Brother 40        Heart Attack    Heart Disease Brother         Heart Attack    Other Daughter         Pseudo Brain Tumor    Early Death Son 28       REVIEW OF SYSTEMS: (POSITIVES WILL BE UNDERLINED)  CONSTITUTIONAL:    Weight change,fatigue, fever, chills  EYES:  Diplopia, change in vision  EARS:  hearing loss, tinnitus, vertigo  NOSE:   epistaxis  MOUTH/THROAT:     hoarseness, sore throat. RESPIRATORY:  SOB,  cough, sputum, hemoptysis  CARDIOVASCULAR : chest pain,palpitations, dyspnea exertion, orthopnea, paroxysmal nocturnal dyspnea, pedal edema. GASTROINTESTINAL:  See HPI  GENITOURINARY:   dysuria, hematuria, . HEMATOLOGIC/LYMPHATIC:   Anemia, bleeding tendencies. MUSCULOSKELETAL:    myalgias,  joint pain  NEUROLOGICAL:   Loss of Consciousness, paresthesias, anesthesias, focal weakness  SKIN :   History of dermatitis, rashes  PSYCHIATRIC:  depression, , anxiety past psychosis  ENDOCRINE:  History of diabetes, thyroid disease  ALL/IMM : allergies, rashes    PHYSICAL EXAM:    Vitals:  BP (!) 159/70   Pulse 84   Temp 98.5 °F (36.9 °C) (Oral)   Resp 16   Ht 5' 4\" (1.626 m)   Wt 225 lb (102.1 kg)   SpO2 96%   BMI 38.62 kg/m²   CONSTITUTIONAL: alert, cooperative, no apparent distress,   EYES:  pupils equal, round and reactive to light and sclera clear  ENT:  normocepalic, without obvious abnormality  NECK:  supple, symmetrical, trachea midline  HEMATOLOGIC/LYMPHATICS:  no cervical lymphadenopathy and no supraclavicular lymphadenopathy  LUNGS:  clear to auscultation  CARDIOVASCULAR:  regular rate and rhythm and no murmur noted  ABDOMEN:  Soft, non-tender with normal bowel sounds.  No organomegaly or masses  NEUROLOGIC: no focal deficit detected  SKIN:  no lesions  EXTREMITIES: no clubbing, cyanosis, or edema    IMPRESSION:  1) nausea, vomiting, weight loss- ? etiology  2) multiple medical co-morbidities as described above    RECOMMENDATIONS:  1) EGD in am        Getachew Luna M.D

## 2019-12-25 NOTE — H&P
Internal Medicine  History and Physical    Patient:  Ayah Waters  MRN: [id number]    CHIEF COMPLAINT:  abd pain, nausea and vomiting w wt loss    History Obtained From:  patient, family member - dtr, electronic medical record  PCP: Nhan Arenas MD    HISTORY OF PRESENT ILLNESS:   The patient is a 67 y.o. female w hx of GERD and also CAD s/p CABG 2001, DM who presented to ED w recurring nausea and vomiting the past several weeks. Some chr lower abd pain noted, she also states has had some recurring exertional CP and SOB. Sx worse yesterday and presented to ED, lab and abd/pelvic CT ok x new hiatal hernia was noted. She has had refractory sx despite treatment on carafate. Does state had stress test in office ~3 yrs ago by Dr Monik Ziegler which was benign. Also on lab noted to have mild dehydration and also incr creat c/w RUSTY. Admitted for further evaluation- OBS STATUS  Ros, NO FEVER, CHILLS, SYNCOPE, PALPITATIONS.   PT AND DTR STATE HER CP SX CAN OCCUR AT REST AND ALSO HAS DECR EXERCISE TOLERANCE, MORE EASILY DYSPNEIC WALKING SHORTER DISTANCES    Past Medical History:        Diagnosis Date    Acid reflux     Arthritis     \"Back\"    Back pain     \"Back Hurts Sometimes\"    CAD (coronary artery disease)     Se[facility] Dr. Cheryle Dart [facility] Dx 2000's    \"Borderline\"    Hiatal hernia     Hx of blood clots 2001    \"Blood Clots Legs After Heart Surgery\"    Hx of migraines     HX OTHER MEDICAL     Primary Care Physician Is Dr. Vinicius Chicas Hyperlipidemia     Hypertension     IBS (irritable bowel syndrome)     Right Breast Cancer 2005 Or 2006     Right Breast Mastectomy    Shortness of breath on exertion     UTI (urinary tract infection) In Past    No Current Symptoms    Wears dentures     Full Upper    Wears glasses     To Read       Past Surgical History:        Procedure Laterality Date    APPENDECTOMY  Unsure When    BREAST SURGERY Right 2005 Or 2006    Right Mastectomy For Breast Cancer [address]  2001    CABG (Two Bypasses)    CHOLECYSTECTOMY, LAPAROSCOPIC  1990's Or 2000's    COLONOSCOPY  \"Two\" Last Done 2000's    Polyps Removed In Past    DENTAL SURGERY      All Upper Teeth Extracted In Past    DILATATION, ESOPHAGUS  \"3 Or 4\" Last Done 7-14    EYE SURGERY Bilateral 2000's    Cataracts With Lens Implants    GASTRIC FUNDOPLICATION  2/3/93    Robotic asssited laprascopic nissen fundoplication    HERNIA REPAIR  [address]    Umbilical Hernia Repair    HIATAL HERNIA REPAIR  2014    INGUINAL HERNIA REPAIR Right 1980's Or 1990's    TONSILLECTOMY  1970's    TUBAL LIGATION  1965    WISDOM TOOTH EXTRACTION      Three Removed In Past       Medications Prior to Admission:    Prior to Admission medications    Medication Sig Start Date End Date Taking? Authorizing Provider   triamterene-hydrochlorothiazide (MAXZIDE-25) 37.5-25 MG per tablet TAKE 1 TABLET BY MOUTH ONE TIME A DAY  2/13/17  Yes Ernie Rivas MD   ondansetron (ZOFRAN ODT) 4 MG disintegrating tablet Take 1 tablet by mouth every 8 hours as needed for Nausea 1/15/17  Yes Luiz Prieto, DO   HYDROcodone-acetaminophen (NORCO) 5-325 MG per tablet Take 1-2 tablets by mouth every 6 hours as needed for Pain . 1/15/17  Yes Eliel Julian, DO   atorvastatin (LIPITOR) 80 MG tablet TAKE 1 TABLET BY MOUTH ONE TIME A DAY  1/5/17  Yes Ernie Rivas MD   lisinopril (PRINIVIL;ZESTRIL) 2.5 MG tablet TAKE 1 TABLET BY MOUTH ONE TIME A DAY  1/5/17  Yes Ernie Rivas MD   sucralfate (CARAFATE) 1 GM tablet Take 1 g by mouth 4 times daily. Yes Historical Provider, MD   metoprolol (LOPRESSOR) 100 MG tablet Take 100 mg by mouth 2 times daily. Yes Historical Provider, MD   metformin (GLUCOPHAGE) 500 MG tablet Take 500 mg by mouth 2 times daily (with meals). Yes Historical Provider, MD   triamterene-hydrochlorothiazide (DYAZIDE) 37.5-25 MG per capsule Take 1 capsule by mouth every morning.      Yes Historical Provider, MD BMP:    Recent Labs     12/25/19 0019   *   K 3.9   CL 96*   CO2 19*   BUN 18   CREATININE 2.3*   GLUCOSE 109*     Hepatic:   Recent Labs     12/25/19 0019   AST 32   ALT 15   BILITOT 0.3   ALKPHOS 66     Troponin: No results for input(s): TROPONINI in the last 72 hours. BNP: No results for input(s): BNP in the last 72 hours. Lipids: No results for input(s): CHOL, HDL in the last 72 hours. Invalid input(s): LDLCALCU  ABGs: No results found for: PHART, PO2ART, EVU4LUM  INR: No results for input(s): INR in the last 72 hours. -----------------------------------------------------------------      Assessment and Plan     Patient Active Problem List   Diagnosis Code    Hypertension I10    Diabetes mellitus ([facility] E11.9    Hyperlipidemia E78.5    Acid reflux K21.9    Chest pain R07.9    CAD (coronary artery disease) I25.10    RUSTY (acute kidney injury) (HCC) N17.9    Lower abdominal pain R10.30    Intractable nausea and vomiting R11.2     69yo BM now presenting w intractable n/v, ?new hiatal hernia noted on CT, profound wt loss, also possible anginal sx at rest.  GI- apprec input fr Dr Seth Mary, planned for EGD in am.  Will add IV protonix and cont carafate. Also ? possible gastroparesis fr her underlying DM may be a possibility. CV- am concerned her nausea could be an anginal equivalent, CABG was 18 yrs ago and has strong likelihood of progression of CAD, recurrence. Consulting Dr January Juárez for input  May need stress testing  RUSTY- IV hydration and serial lab w BUN/Cr, lytes. If elevation persists also consdier consulting nephrology. DM- w RUSTY metformin held, for SSI/accuchecks. AS NOTED, PT IS OBS STATUS BUT IF ANY POSITIVE FINDINGS ON FURTHER EVAL MAY WARRANT FURTHER TESTING ON INPATIENT BASIS.     Rizwan Cummins

## 2019-12-25 NOTE — ED PROVIDER NOTES
[address] ENCOUNTER      Pt Name: Gino Stratton  MRN: [id number]  [place] 1940  Date of evaluation: 12/24/2019  Provider: Roger Pitts MD    CHIEF COMPLAINT       Chief Complaint   Patient presents with    Abdominal Pain    Emesis    Constipation         HISTORY OF PRESENT ILLNESS   (Location/Symptom, Timing/Onset, Context/Setting, Quality, Duration, Modifying Factors, Severity)  Note limiting factors. Gino Stratton is a 67 y.o. female who presents to the emergency department      64-year-old female history of CAD hypertension hyperlipidemia has had multiple abdominal surgeries says that she was in her usual state of health until about 3 days ago when she says she started having some nausea multiple episodes of emesis and abdominal pain. She says that her abdominal pain is diffuse and mild to moderate and constant nothing seems to make it better or worse has attempted any specific treatment. She denies any blood in her stool or her emesis. She says the pain in her abdomen does not radiate and describes it as ache like. She says that her symptoms have been stable during this time period. She denies any urinary symptoms including no dysuria frequency urgency or complete voiding. She also denies any fevers. No recent travel no sick contacts    The history is provided by the patient. Nursing Notes were reviewed. REVIEW OF SYSTEMS    (2-9 systems for level 4, 10 or more for level 5)     Review of Systems   Constitutional: Negative for fatigue, fever and unexpected weight change. HENT: Negative for trouble swallowing and voice change. Eyes: Negative for pain and visual disturbance. Respiratory: Negative for shortness of breath, wheezing and stridor. Cardiovascular: Negative for chest pain, palpitations and leg swelling. Gastrointestinal: Positive for abdominal pain, nausea and vomiting.  Negative for SURGERY Bilateral 2000's    Cataracts With Lens Implants    GASTRIC FUNDOPLICATION  1/9/67    Robotic asssited laprascopic nissen fundoplication    HERNIA REPAIR  7507'F    Umbilical Hernia Repair    HIATAL HERNIA REPAIR  2014    INGUINAL HERNIA REPAIR Right 1980's Or 1990's    TONSILLECTOMY  1970's    TUBAL LIGATION  1965    WISDOM TOOTH EXTRACTION      Three Removed In Past         CURRENT MEDICATIONS       Previous Medications    ATORVASTATIN (LIPITOR) 80 MG TABLET    TAKE 1 TABLET BY MOUTH ONE TIME A DAY     GEMFIBROZIL (LOPID) 600 MG TABLET    Take 600 mg by mouth 2 times daily. HYDROCODONE-ACETAMINOPHEN (NORCO) 5-325 MG PER TABLET    Take 1-2 tablets by mouth every 6 hours as needed for Pain . LISINOPRIL (PRINIVIL;ZESTRIL) 2.5 MG TABLET    TAKE 1 TABLET BY MOUTH ONE TIME A DAY     METFORMIN (GLUCOPHAGE) 500 MG TABLET    Take 500 mg by mouth 2 times daily (with meals). METOPROLOL (LOPRESSOR) 100 MG TABLET    Take 100 mg by mouth 2 times daily. ONDANSETRON (ZOFRAN ODT) 4 MG DISINTEGRATING TABLET    Take 1 tablet by mouth every 8 hours as needed for Nausea    SUCRALFATE (CARAFATE) 1 GM TABLET    Take 1 g by mouth 4 times daily. TRIAMTERENE-HYDROCHLOROTHIAZIDE (DYAZIDE) 37.5-25 MG PER CAPSULE    Take 1 capsule by mouth every morning. TRIAMTERENE-HYDROCHLOROTHIAZIDE (MAXZIDE-25) 37.5-25 MG PER TABLET    TAKE 1 TABLET BY MOUTH ONE TIME A DAY        ALLERGIES     Patient has no known allergies.     FAMILY HISTORY       Family History   Problem Relation Age of Onset    Heart Disease Mother         AAA    Early Death Father 28        Heart Attack    Heart Disease Father         Heart Attack     Early Death Brother         In His 42's Or 52's    Early Death Brother 40        Heart Attack    Heart Disease Brother         Heart Attack    Other Daughter         Pseudo Brain Tumor    Early Death Son 28          SOCIAL HISTORY       Social History     Socioeconomic History    Marital status: Single     Spouse name: None    Number of children: None    Years of education: None    Highest education level: None   Occupational History    None   Social Needs    Financial resource strain: None    Food insecurity:     Worry: None     Inability: None    Transportation needs:     Medical: None     Non-medical: None   Tobacco Use    Smoking status: Former Smoker     Years: 7.00     Start date: 1971     Last attempt to quit: 1978     Years since quittin.4    Smokeless tobacco: Never Used   Substance and Sexual Activity    Alcohol use: No    Drug use: No     Comment: \"Smoked About 2 Or 3 Cigarettes A Day When Quit, I Didn't Ever Inhale\"    Sexual activity: Never   Lifestyle    Physical activity:     Days per week: None     Minutes per session: None    Stress: None   Relationships    Social connections:     Talks on phone: None     Gets together: None     Attends [employer] service: None     Active member of club or organization: None     Attends meetings of clubs or organizations: None     Relationship status: None    Intimate partner violence:     Fear of current or ex partner: None     Emotionally abused: None     Physically abused: None     Forced sexual activity: None   Other Topics Concern    None   Social History Narrative    None       SCREENINGS    Cherie Coma Scale  Eye Opening: Spontaneous  Best Verbal Response: Oriented  Best Motor Response: Obeys commands  Lipan Coma Scale Score: 15          PHYSICAL EXAM    (up to 7 for level 4, 8 or more for level 5)     ED Triage Vitals   BP Temp Temp Source Pulse Resp SpO2 Height Weight   19 2345 19 2345 19 2345 19 2345 19 2345 19 2345 19 2331 19 2331   (!) 151/116 99 °F (37.2 °C) Oral 91 18 98 % 5' 4\" (1.626 m) 225 lb (102.1 kg)       Physical Exam  Vitals signs and nursing note reviewed. Constitutional:       General: She is not in acute distress.      Appearance: She is well-developed. She is not ill-appearing, toxic-appearing or diaphoretic. Comments: No acute distress nontoxic   HENT:      Head: Normocephalic and atraumatic. Right Ear: External ear normal.      Left Ear: External ear normal.      Nose: Nose normal.      Mouth/Throat:      Pharynx: No oropharyngeal exudate. Eyes:      General: No scleral icterus. Right eye: No discharge. Left eye: No discharge. Extraocular Movements: Extraocular movements intact. Conjunctiva/sclera: Conjunctivae normal.      Pupils: Pupils are equal, round, and reactive to light. Neck:      Musculoskeletal: Normal range of motion and neck supple. No neck rigidity or muscular tenderness. Trachea: No tracheal deviation. Cardiovascular:      Rate and Rhythm: Normal rate and regular rhythm. Pulses: Normal pulses. Heart sounds: Normal heart sounds. No murmur. No friction rub. No gallop. Pulmonary:      Effort: Pulmonary effort is normal. No respiratory distress. Breath sounds: Normal breath sounds. No stridor. No wheezing, rhonchi or rales. Chest:      Chest wall: No tenderness. Abdominal:      General: Bowel sounds are normal. There is no distension. Palpations: Abdomen is soft. There is no mass. Tenderness: There is tenderness. There is no right CVA tenderness, left CVA tenderness, guarding or rebound. Hernia: No hernia is present. Comments: Some diffuse mild tenderness without rebound or guarding. Negative Quentin's point. Herzog's. No CVA tenderness. No masses pulsatile otherwise. Musculoskeletal: Normal range of motion. General: No swelling, tenderness, deformity or signs of injury. Right lower leg: No edema. Left lower leg: No edema. Comments: Equal pulses throughout   Skin:     General: Skin is warm. Capillary Refill: Capillary refill takes less than 2 seconds. Coloration: Skin is not jaundiced or pale.       Findings: No rash.   Neurological:      General: No focal deficit present. Mental Status: She is alert and oriented to person, place, and time. Mental status is at baseline. Cranial Nerves: No cranial nerve deficit. Sensory: No sensory deficit. Motor: No weakness or abnormal muscle tone. Coordination: Coordination normal.      Gait: Gait normal.   Psychiatric:         Mood and Affect: Mood normal.         Behavior: Behavior normal.         Thought Content:  Thought content normal.         Judgment: Judgment normal.         DIAGNOSTIC RESULTS     EKG: All EKG's are interpreted by the Emergency Department Physician who either signs or Co-signs this chart in the absence of a cardiologist.    Regular rate, regular rhythm, normal-appearing ST segments, compared to prior    RADIOLOGY:   Non-plain film images such as CT, Ultrasound and MRI are read by the radiologist. Plain radiographic images are visualized and preliminarily interpreted by the emergency physician with the below findings:        Interpretation per the Radiologist below, if available at the time of this note:    CT ABDOMEN PELVIS W IV CONTRAST Additional Contrast? None    (Results Pending)         ED BEDSIDE ULTRASOUND:   Performed by ED Physician - none    LABS:  Labs Reviewed   CBC WITH AUTO DIFFERENTIAL - Abnormal; Notable for the following components:       Result Value    RBC 3.33 (*)     Hemoglobin 9.9 (*)     Hematocrit 32.5 (*)     MCHC 30.5 (*)     Segs Relative 69.7 (*)     Lymphocytes % 22.8 (*)     Monocytes % 5.7 (*)     All other components within normal limits   BASIC METABOLIC PANEL W/ REFLEX TO MG FOR LOW K - Abnormal; Notable for the following components:    Sodium 132 (*)     Chloride 96 (*)     CO2 19 (*)     Anion Gap 17 (*)     CREATININE 2.3 (*)     Glucose 109 (*)     GFR Non- 21 (*)     GFR  25 (*)     All other components within normal limits   HEPATIC FUNCTION PANEL   LIPASE   TROPONIN

## 2019-12-26 NOTE — CONSULTS
she quit smoking about 41 years ago. She started smoking about 48 years ago. She quit after 7.00 years of use. She has never used smokeless tobacco. She reports that she does not drink alcohol or use drugs. Family history:  family history includes Early Death in her brother; Early Death (age of onset: 28) in her father; Early Death (age of onset: 28) in her son; Early Death (age of onset: 40) in her brother; Heart Disease in her brother, father, and mother; Other in her daughter.     No Known Allergies    atorvastatin (LIPITOR) tablet 80 mg, Daily  gemfibrozil (LOPID) tablet 600 mg, BID  lisinopril (PRINIVIL;ZESTRIL) tablet 2.5 mg, Daily  metoprolol tartrate (LOPRESSOR) tablet 100 mg, BID  sucralfate (CARAFATE) tablet 1 g, 4x Daily AC & HS  0.9 % sodium chloride infusion, Continuous  sodium chloride flush 0.9 % injection 10 mL, 2 times per day  sodium chloride flush 0.9 % injection 10 mL, PRN  magnesium hydroxide (MILK OF MAGNESIA) 400 MG/5ML suspension 30 mL, Daily PRN  ondansetron (ZOFRAN) injection 4 mg, Q6H PRN  glucose (GLUTOSE) 40 % oral gel 15 g, PRN  dextrose 50 % IV solution, PRN  glucagon (rDNA) injection 1 mg, PRN  dextrose 5 % solution, PRN  insulin lispro (HUMALOG) injection vial 0-6 Units, TID WC  insulin lispro (HUMALOG) injection vial 0-3 Units, Nightly  morphine (PF) injection 1 mg, Q4H PRN  enoxaparin (LOVENOX) injection 30 mg, Daily      Current Facility-Administered Medications   Medication Dose Route Frequency Provider Last Rate Last Dose    atorvastatin (LIPITOR) tablet 80 mg  80 mg Oral Daily Peri Nunez MD   80 mg at 12/25/19 0815    gemfibrozil (LOPID) tablet 600 mg  600 mg Oral BID Peri Nunez MD   600 mg at 12/25/19 2055    lisinopril (PRINIVIL;ZESTRIL) tablet 2.5 mg  2.5 mg Oral Daily Peri Nunez MD   2.5 mg at 12/25/19 0815    metoprolol tartrate (LOPRESSOR) tablet 100 mg  100 mg Oral BID Peri Nunez MD   100 mg at 12/25/19 2055    sucralfate complaints  · Integumentary: No rash or pruritis  · Neurological: No TIA or stroke symptoms  · Psychiatric: No anxiety or depression  · Endocrine: No malaise, fatigue or temperature intolerance  · Hematologic/Lymphatic: No bleeding problems, blood clots or swollen lymph nodes  · Allergic/Immunologic: No nasal congestion or hives  ·   ·      Physical Examination:    Vitals:    12/26/19 0645   BP: (!) 138/58   Pulse: 67   Resp: 16   Temp: 97.2 °F (36.2 °C)   SpO2: 95%        General Appearance:      HEENT: normal    NECK: No JVP or carotid bruits  No thromegaly  Cardiovascular: Auscultation: Normal S1 and S2,      Respiratory:  Breath sounds are Normal    Extremities:  No Edema clubbing or cyanosis    SKIN: Warm and well perfused, no pallor or cyanosis    Vascular exam:  : ,Femoral Arteries: 2+ and equal, Pedal Pulses: 2+ and equal     Abdomen: nontender      Neurological/Psychiatric:  nonfocal  Normal affect  Lab Review   Recent Labs     12/26/19  0713   WBC 8.5   HGB 9.0*   HCT 29.2*         Recent Labs     12/26/19  0713   *   K 4.4   CL 98*   CO2 20*   BUN 16   CREATININE 2.1*     Recent Labs     12/25/19  0019 12/26/19  0713   AST 32 27   ALT 15 12   BILIDIR 0.2  --    BILITOT 0.3 0.4   ALKPHOS 66 55     No results for input(s): TROPONINI in the last 72 hours.   Lab Results   Component Value Date    BNP 49 08/17/2012     Lab Results   Component Value Date    INR 1.17 04/21/2015    PROTIME 13.3 (H) 04/21/2015       QUALITY MEASURES:  CAD:    EKG:    Chest Xray:    ECHO:  Labs, echo, meds reviewed  Assessment:   [unfilled]   Patient Active Problem List   Diagnosis Code    Hypertension I10    Diabetes mellitus ([facility] [address] E11.9    Hyperlipidemia E78.5    Acid reflux K21.9    Chest pain R07.9    CAD (coronary artery disease) I25.10    [facility] N17.9    Lower abdominal pain R10.30    Intractable nausea and vomiting R11.2     [unfilled]  Problem List Items Addressed This Visit * (Principal) RUSTY (acute kidney injury) ([facility] [address] - Primary    Intractable nausea and vomiting      1 recurrent chest pain in a patient with  CABG x2,  2001 and multiple risk factors with recurrent chest pain.   We will proceed with nuclear stress test and 2D echo for further evaluation    2 CAD with history of a CABG x2 vessel 2001    3 hypertension  Well controlled at present    4 diabetes management per my physician    5 high cholesterol  Check lipid profile    6 nausea and vomiting poor p.o. intake and weight loss  GI evaluation in progress    Recommendations:       ADDENDUM    Nuclear  Stress  Negative  For  Ischemia  EF    70%    Ok  To  Proceed    With  EGD         Emil Solis MD, 12/26/2019 9:08 AM

## 2019-12-26 NOTE — CONSULTS
IV Consult complete. Attempted PIV  On Right FA x3 without success, including AC, unable to advance catheter. Bilateral arms assessed for possibility of PICC or Midline, no vessels accessible less than 3cm. Notified SDS nurse that PICC team unable to obtain access.

## 2019-12-26 NOTE — PROGRESS NOTES
INTERNAL MEDICINE PROGRESS NOTE        Twyla Kim   1940   Primary Care Physician:  Humberto Rodriges MD  Admit Date: 12/24/2019     Subjective:   Pt is doing fair today. Denies chest pain, SOB, nausea, vomiting, abdominal pain. Remainder of ROS is unremarkable. Meds, labs and other notes reviewed. CT abdomen:  Impression:        1. No acute abdominopelvic findings. 2. Diverticulosis. 3. Small recurrent hiatal hernia, new since the comparison study. 4. Coronary artery disease and atherosclerosis. 5. Osteopenia. Objective:   BP (!) 138/121   Pulse 75   Temp 98.7 °F (37.1 °C) (Oral)   Resp 16   Ht 5' 4\" (1.626 m)   Wt 225 lb (102.1 kg)   SpO2 (!) 82%   BMI 38.62 kg/m²    Recent Labs     12/26/19  0646 12/26/19  0806 12/26/19  1142 12/26/19  1616   POCGLU 86 84 103* 89       I/O last 3 completed shifts:  In: -   Out: 400 [Urine:400]  No intake/output data recorded.     Neck: no adenopathy and supple, symmetrical, trachea midline  Lungs: clear to auscultation bilaterally  Heart: regular rate and rhythm and S1, S2 normal  Abdomen: obese, soft, non-tender; bowel sounds normal; no masses,  no organomegaly  Extremities: extremities normal, atraumatic, no cyanosis or edema  Neurologic: Grossly normal    Data Review  CBC with Differential:    Recent Labs     12/25/19 0019 12/26/19  0713   WBC 8.4 8.5   RBC 3.33* 3.08*   HGB 9.9* 9.0*   HCT 32.5* 29.2*    211   MCV 97.6 94.8   MCH 29.7 29.2   MCHC 30.5* 30.8*   RDW 14.8 15.3*   SEGSPCT 69.7* 48.5   LYMPHOPCT 22.8* 38.5   MONOPCT 5.7* 8.7*   BASOPCT 0.2 0.5   MONOSABS 0.5 0.7   LYMPHSABS 1.9 3.3   EOSABS 0.1 0.3   BASOSABS 0.0 0.0   DIFFTYPE AUTOMATED DIFFERENTIAL AUTOMATED DIFFERENTIAL     CMP:    Recent Labs     12/25/19  0019 12/26/19  0713   * 130*   K 3.9 4.4   CL 96* 98*   CO2 19* 20*   BUN 18 16   CREATININE 2.3* 2.1*   GFRAA 25* 28*   LABGLOM 21* 23*   GLUCOSE 109* 85   PROT 7.2 6.2*   LABALBU 3.9 3.4   CALCIUM 8.9 8.1* BILITOT 0.3 0.4   ALKPHOS 66 55   AST 32 27   ALT 15 12     PT/INR:  No results for input(s): PROTIME, INR in the last 72 hours. Meds:    atorvastatin  80 mg Oral Daily    gemfibrozil  600 mg Oral BID    lisinopril  2.5 mg Oral Daily    metoprolol  100 mg Oral BID    sucralfate  1 g Oral 4x Daily AC & HS    sodium chloride flush  10 mL Intravenous 2 times per day    insulin lispro  0-6 Units Subcutaneous TID WC    insulin lispro  0-3 Units Subcutaneous Nightly    enoxaparin  30 mg Subcutaneous Daily     PRN Meds: sodium chloride flush, magnesium hydroxide, ondansetron, glucose, dextrose, glucagon (rDNA), dextrose, morphine    Assessment/Plan:   1. Abdominal pain, nausea, vomiting, recent weight loss. Appreciate GI eval.  To have EGD. 2. Atypical chest pain. Cardiology eval.   3. HTN, CAD, HLP.  CPM.  4. CKD.             Humberto Rodriges MD  12/26/2019 5:20 PM

## 2019-12-27 NOTE — PLAN OF CARE
Problem: Pain:  Goal: Pain level will decrease  Description  Pain level will decrease  Outcome: Ongoing  Goal: Control of acute pain  Description  Control of acute pain  Outcome: Ongoing  Goal: Control of chronic pain  Description  Control of chronic pain  Outcome: Ongoing     Problem: Falls - Risk of:  Goal: Will remain free from falls  Description  Will remain free from falls  Outcome: Ongoing  Goal: Absence of physical injury  Description  Absence of physical injury  Outcome: Ongoing     Problem: Nausea/Vomiting:  Goal: Absence of nausea/vomiting  Description  Absence of nausea/vomiting  Outcome: Ongoing  Goal: Able to drink  Description  Able to drink  Outcome: Ongoing  Goal: Able to eat  Description  Able to eat  Outcome: Ongoing  Goal: Ability to achieve adequate nutritional intake will improve  Description  Ability to achieve adequate nutritional intake will improve  Outcome: Ongoing     Problem: Nutrition  Goal: Optimal nutrition therapy  12/26/2019 2246 by Sana Pisano RN  Outcome: Ongoing  12/26/2019 1328 by Joss Gay RD, LD  Outcome: Ongoing

## 2019-12-27 NOTE — PROGRESS NOTES
INTERNAL MEDICINE PROGRESS NOTE        Susi Dave   1940   Primary Care Physician:  Shiloh Gupta MD  Admit Date: 12/24/2019     Subjective:   Pt is doing fair today. Still c/o nausea. Denies chest pain, SOB. Remainder of ROS is unremarkable. Meds, labs and other notes reviewed. EGD:    Impression:              1) erosive duodenitis              2) small hiatal hernia             3) antral and fundic biopsies taken to assay for H pylori by the Janice-Test method     CT abdomen:  Impression:        1. No acute abdominopelvic findings. 2. Diverticulosis. 3. Small recurrent hiatal hernia, new since the comparison study. 4. Coronary artery disease and atherosclerosis. 5. Osteopenia. Objective:   /62   Pulse 71   Temp 98.1 °F (36.7 °C) (Oral)   Resp 14   Ht 5' 4\" (1.626 m)   Wt 227 lb 9.6 oz (103.2 kg)   SpO2 98%   BMI 39.07 kg/m²    Recent Labs     12/26/19  1142 12/26/19  1616 12/26/19  2030 12/27/19  0836   POCGLU 103* 89 112* 92       I/O last 3 completed shifts: In: 400 [P.O.:400]  Out: 350 [Urine:350]  I/O this shift: In: 500 [I.V.:500]  Out: -     Neck: no adenopathy and supple, symmetrical, trachea midline  Lungs: clear to auscultation bilaterally  Heart: regular rate and rhythm and S1, S2 normal  Abdomen: obese, soft, non-tender; bowel sounds normal; no masses,  no organomegaly  Extremities: extremities normal, atraumatic, no cyanosis.   Trace edema  Neurologic: Grossly normal    Data Review  CBC with Differential:    Recent Labs     12/25/19  0019 12/26/19  0713   WBC 8.4 8.5   RBC 3.33* 3.08*   HGB 9.9* 9.0*   HCT 32.5* 29.2*    211   MCV 97.6 94.8   MCH 29.7 29.2   MCHC 30.5* 30.8*   RDW 14.8 15.3*   SEGSPCT 69.7* 48.5   LYMPHOPCT 22.8* 38.5   MONOPCT 5.7* 8.7*   BASOPCT 0.2 0.5   MONOSABS 0.5 0.7   LYMPHSABS 1.9 3.3   EOSABS 0.1 0.3   BASOSABS 0.0 0.0   DIFFTYPE AUTOMATED DIFFERENTIAL AUTOMATED DIFFERENTIAL     CMP:    Recent Labs     12/25/19  0019 12/26/19  1021 * 130*   K 3.9 4.4   CL 96* 98*   CO2 19* 20*   BUN 18 16   CREATININE 2.3* 2.1*   GFRAA 25* 28*   LABGLOM 21* 23*   GLUCOSE 109* 85   PROT 7.2 6.2*   LABALBU 3.9 3.4   CALCIUM 8.9 8.1*   BILITOT 0.3 0.4   ALKPHOS 66 55   AST 32 27   ALT 15 12     PT/INR:  No results for input(s): PROTIME, INR in the last 72 hours. Meds:    pantoprazole  40 mg Oral QAM AC    atorvastatin  80 mg Oral Daily    gemfibrozil  600 mg Oral BID    lisinopril  2.5 mg Oral Daily    metoprolol  100 mg Oral BID    sucralfate  1 g Oral 4x Daily AC & HS    sodium chloride flush  10 mL Intravenous 2 times per day    insulin lispro  0-6 Units Subcutaneous TID WC    insulin lispro  0-3 Units Subcutaneous Nightly    enoxaparin  30 mg Subcutaneous Daily     PRN Meds: sodium chloride flush, magnesium hydroxide, ondansetron, glucose, dextrose, glucagon (rDNA), dextrose, morphine    Assessment/Plan:   1. Abdominal pain, nausea, vomiting, recent weight loss. Erosive Duodenitis. Started Protonix. 2. Atypical chest pain. Stress test is normal. Getting ECHO this am.   3. HTN, CAD, HLP.  CPM.  4. CKD.             Elvie Carrel, MD  12/27/2019 8:56 AM

## 2019-12-27 NOTE — PROGRESS NOTES
disease)    RUSTY (acute kidney injury) ([facility] [address]    Lower abdominal pain    Intractable nausea and vomiting        No Known Allergies     Current Inpatient Medications:    Current Facility-Administered Medications   Medication Dose Route Frequency Provider Last Rate Last Dose    pantoprazole (PROTONIX) tablet 40 mg  40 mg Oral QAM AC Stefanie Alejandro MD   40 mg at 12/27/19 1009    promethazine (PHENERGAN) tablet 25 mg  25 mg Oral Q6H PRN Ginny Jose MD   25 mg at 12/27/19 1009    atorvastatin (LIPITOR) tablet 80 mg  80 mg Oral Daily Rocío Goss MD   80 mg at 12/27/19 1009    gemfibrozil (LOPID) tablet 600 mg  600 mg Oral BID Rocío oGss MD   600 mg at 12/27/19 1009    lisinopril (PRINIVIL;ZESTRIL) tablet 2.5 mg  2.5 mg Oral Daily Rocío Goss MD   2.5 mg at 12/27/19 1009    metoprolol tartrate (LOPRESSOR) tablet 100 mg  100 mg Oral BID Rocío Goss MD   100 mg at 12/27/19 1009    sucralfate (CARAFATE) tablet 1 g  1 g Oral 4x Daily AC & HS Rocío Goss MD   1 g at 12/27/19 1009    sodium chloride flush 0.9 % injection 10 mL  10 mL Intravenous 2 times per day Rocío Goss MD   10 mL at 12/25/19 2055    sodium chloride flush 0.9 % injection 10 mL  10 mL Intravenous PRN Rocío Goss MD        magnesium hydroxide (MILK OF MAGNESIA) 400 MG/5ML suspension 30 mL  30 mL Oral Daily PRN Rocío Goss MD        ondansetron [facility]) injection 4 mg  4 mg Intravenous Q6H PRN Rocío Goss MD        glucose (GLUTOSE) 40 % oral gel 15 g  15 g Oral PRN Rocío Goss MD        dextrose 50 % IV solution  12.5 g Intravenous PRN Rocío Goss MD        glucagon (rDNA) injection 1 mg  1 mg Intramuscular PRN Rocío Goss MD        dextrose 5 % solution  100 mL/hr Intravenous PRN Rocío Goss MD        insulin lispro (HUMALOG) injection vial 0-6 Units  0-6 Units Subcutaneous TID WC Rocío Goss MD        insulin lispro (HUMALOG) injection vial 0-3 Units  0-3 Units Subcutaneous Nightly Nereida Olson MD        morphine (PF) injection 1 mg  1 mg Intravenous Q4H PRN Nereida Olson MD   1 mg at 12/26/19 0059    enoxaparin (LOVENOX) injection 30 mg  30 mg Subcutaneous Daily Nereida Olson MD   30 mg at 12/27/19 1008           Labs:  CBC with Differential:    Lab Results   Component Value Date    WBC 8.5 12/26/2019    RBC 3.08 12/26/2019    HGB 9.0 12/26/2019    HCT 29.2 12/26/2019     12/26/2019    MCV 94.8 12/26/2019    MCH 29.2 12/26/2019    MCHC 30.8 12/26/2019    RDW 15.3 12/26/2019    SEGSPCT 48.5 12/26/2019    LYMPHOPCT 38.5 12/26/2019    MONOPCT 8.7 12/26/2019    EOSPCT 1.7 01/12/2012    BASOPCT 0.5 12/26/2019    MONOSABS 0.7 12/26/2019    LYMPHSABS 3.3 12/26/2019    EOSABS 0.3 12/26/2019    BASOSABS 0.0 12/26/2019    DIFFTYPE AUTOMATED DIFFERENTIAL 12/26/2019     CMP:    Lab Results   Component Value Date     12/26/2019    K 4.4 12/26/2019    CL 98 12/26/2019    CO2 20 12/26/2019    BUN 16 12/26/2019    CREATININE 2.1 12/26/2019    GFRAA 28 12/26/2019    LABGLOM 23 12/26/2019    GLUCOSE 85 12/26/2019    PROT 6.2 12/26/2019    PROT 7.4 01/10/2013    LABALBU 3.4 12/26/2019    LABALBU 100 12/07/2019    CALCIUM 8.1 12/26/2019    BILITOT 0.4 12/26/2019    ALKPHOS 55 12/26/2019    AST 27 12/26/2019    ALT 12 12/26/2019     Hepatic Function Panel:    Lab Results   Component Value Date    ALKPHOS 55 12/26/2019    ALT 12 12/26/2019    AST 27 12/26/2019    PROT 6.2 12/26/2019    PROT 7.4 01/10/2013    BILITOT 0.4 12/26/2019    BILIDIR 0.2 12/25/2019    IBILI 0.1 12/25/2019    LABALBU 3.4 12/26/2019    LABALBU 100 12/07/2019     Magnesium:  No results found for: MG  PT/INR:    Lab Results   Component Value Date    PROTIME 13.3 04/21/2015    INR 1.17 04/21/2015     Last 3 Troponin:    Lab Results   Component Value Date    TROPONINI <0.006 08/18/2012    TROPONINI <0.006 08/17/2012    TROPONINI <0.006 08/17/2012     U/A: Lab Results   Component Value Date    COLORU YELLOW 12/26/2019    WBCUA 143 12/26/2019    RBCUA NONE SEEN 12/26/2019    MUCUS RARE 12/10/2019    TRICHOMONAS NONE SEEN 12/26/2019    BACTERIA NEGATIVE 12/26/2019    CLARITYU HAZY 12/26/2019    SPECGRAV 1.038 12/26/2019    SPECGRAV  12/26/2019     (NOTE)  CONSIDER URINE OSMOLARITY TEST IF CLINICALLY INDICATED        LEUKOCYTESUR LARGE 12/26/2019    UROBILINOGEN NORMAL 12/26/2019    BILIRUBINUR NEGATIVE 12/26/2019    BLOODU NEGATIVE 12/26/2019     ABG:  No results found for: PHART, DQY0GNM, PO2ART, RQO3MDW, BEART, THGBART, NEK2QNL, G7ZYROIO  FLP:    Lab Results   Component Value Date    TRIG 116 12/26/2019    HDL 35 12/26/2019    LDLCALC 110 06/30/2017    LDLDIRECT 97 12/26/2019     TSH:  No results found for: TSH   DATA:   ECG: Sinus Rhythm       ASSESSMENT:     1 recurrent chest pain in a patient with  CABG x2,  2001 and multiple risk factors with recurrent chest pain.   Nuclear stress test negative for ischemia 2D echo normal LV function     2 CAD with history of a CABG x2 vessel 2001  Normal LV function    3 hypertension  Well controlled at present     4 diabetes management per my physician     5 high cholesterol  Check lipid profile     6 nausea and vomiting poor p.o. intake and weight loss  GI evaluation noted, gastritis and esophagitis likely contribution to chest pain as well  PLAN   Stable from cardiology standpoint

## 2019-12-27 NOTE — PROGRESS NOTES
Pt alert and oriented. Denies pain and nausea on assessment. NPO after midnight for EDG in the AM.  No IV access at this time and plan is to place a central line before procedure.

## 2019-12-28 PROBLEM — K29.80 DUODENITIS: Status: ACTIVE | Noted: 2019-01-01

## 2019-12-28 NOTE — PROGRESS NOTES
pain    CAD (coronary artery disease)    RUSTY (acute kidney injury) ([facility] [address]    Lower abdominal pain    Intractable nausea and vomiting        No Known Allergies     Current Inpatient Medications:    Current Facility-Administered Medications   Medication Dose Route Frequency Provider Last Rate Last Dose    pantoprazole (PROTONIX) tablet 40 mg  40 mg Oral QAM AC Geoff Pagan MD   40 mg at 12/28/19 0604    promethazine (PHENERGAN) tablet 25 mg  25 mg Oral Q6H PRN Audrey Longoria MD   25 mg at 12/27/19 1009    atorvastatin (LIPITOR) tablet 80 mg  80 mg Oral Daily Irma Lemus MD   80 mg at 12/28/19 0841    gemfibrozil (LOPID) tablet 600 mg  600 mg Oral BID Irma Lemus MD   600 mg at 12/28/19 0841    lisinopril (PRINIVIL;ZESTRIL) tablet 2.5 mg  2.5 mg Oral Daily Irma Lemus MD   2.5 mg at 12/28/19 0841    metoprolol tartrate (LOPRESSOR) tablet 100 mg  100 mg Oral BID Irma Lemus MD   100 mg at 12/28/19 0841    sucralfate (CARAFATE) tablet 1 g  1 g Oral 4x Daily AC & HS Irma Lemus MD   1 g at 12/28/19 0604    sodium chloride flush 0.9 % injection 10 mL  10 mL Intravenous 2 times per day Irma Lemus MD   10 mL at 12/27/19 2317    sodium chloride flush 0.9 % injection 10 mL  10 mL Intravenous PRN Irma Lemus MD        magnesium hydroxide (MILK OF MAGNESIA) 400 MG/5ML suspension 30 mL  30 mL Oral Daily PRN Irma Lemus MD        ondansetron [facility]) injection 4 mg  4 mg Intravenous Q6H PRN Irma Lemus MD        glucose (GLUTOSE) 40 % oral gel 15 g  15 g Oral PRN Irma Lemus MD        dextrose 50 % IV solution  12.5 g Intravenous PRN Irma Lemus MD        glucagon (rDNA) injection 1 mg  1 mg Intramuscular PRN Irma Lemus MD        dextrose 5 % solution  100 mL/hr Intravenous PRN Irma Lemus MD        insulin lispro (HUMALOG) injection vial 0-6 Units  0-6 Units Subcutaneous TID DUKE Lemus MD TROPONINI <0.006 08/17/2012     U/A:    Lab Results   Component Value Date    COLORU YELLOW 12/26/2019    WBCUA 143 12/26/2019    RBCUA NONE SEEN 12/26/2019    MUCUS RARE 12/10/2019    TRICHOMONAS NONE SEEN 12/26/2019    BACTERIA NEGATIVE 12/26/2019    CLARITYU HAZY 12/26/2019    SPECGRAV 1.038 12/26/2019    SPECGRAV  12/26/2019     (NOTE)  CONSIDER URINE OSMOLARITY TEST IF CLINICALLY INDICATED        LEUKOCYTESUR LARGE 12/26/2019    UROBILINOGEN NORMAL 12/26/2019    BILIRUBINUR NEGATIVE 12/26/2019    BLOODU NEGATIVE 12/26/2019     ABG:  No results found for: PHART, SXJ1ZLE, PO2ART, VEL9PHG, BEART, THGBART, XZM7NAL, Q9VGYCGS  FLP:    Lab Results   Component Value Date    TRIG 116 12/26/2019    HDL 35 12/26/2019    LDLCALC 110 06/30/2017    LDLDIRECT 97 12/26/2019     TSH:  No results found for: TSH   DATA:   ECG: Sinus Rhythm       ASSESSMENT:   1 recurrent chest pain in a patient with  CABG x2,  2001 and multiple risk factors with recurrent chest pain. Nuclear stress test negative for ischemia 2D echo normal LV function.     Chest  Pain  likley  Due to  GI  Issues       2 CAD with history of a CABG x2 vessel 2001  Normal LV function    3 hypertension  Well controlled at present     4 diabetes management per my physician     5 high cholesterol  Check lipid profile     6 nausea and vomiting poor p.o. intake and weight loss  GI evaluation noted,   likely contribution to chest pain as well  PLAN   Stable from cardiology standpoint

## 2019-12-28 NOTE — PLAN OF CARE
Problem: Pain:  Goal: Pain level will decrease  Description  Pain level will decrease  Outcome: Ongoing  Goal: Control of acute pain  Description  Control of acute pain  Outcome: Ongoing  Goal: Control of chronic pain  Description  Control of chronic pain  Outcome: Ongoing     Problem: Falls - Risk of:  Goal: Will remain free from falls  Description  Will remain free from falls  Outcome: Ongoing  Goal: Absence of physical injury  Description  Absence of physical injury  Outcome: Ongoing     Problem: Nausea/Vomiting:  Goal: Absence of nausea/vomiting  Description  Absence of nausea/vomiting  Outcome: Ongoing  Goal: Able to drink  Description  Able to drink  Outcome: Ongoing  Goal: Able to eat  Description  Able to eat  Outcome: Ongoing  Goal: Ability to achieve adequate nutritional intake will improve  Description  Ability to achieve adequate nutritional intake will improve  Outcome: Ongoing     Problem: Nutrition  Goal: Optimal nutrition therapy  Outcome: Ongoing

## 2020-01-01 ENCOUNTER — APPOINTMENT (OUTPATIENT)
Dept: CT IMAGING | Age: 80
DRG: 092 | End: 2020-01-01
Payer: COMMERCIAL

## 2020-01-01 ENCOUNTER — HOSPITAL ENCOUNTER (INPATIENT)
Age: 80
LOS: 7 days | Discharge: ANOTHER HOSPITAL | DRG: 563 | End: 2020-02-21
Attending: INTERNAL MEDICINE
Payer: COMMERCIAL

## 2020-01-01 ENCOUNTER — HOSPITAL ENCOUNTER (OUTPATIENT)
Age: 80
Setting detail: SPECIMEN
Discharge: HOME | End: 2020-01-31

## 2020-01-01 ENCOUNTER — APPOINTMENT (OUTPATIENT)
Dept: GENERAL RADIOLOGY | Age: 80
DRG: 092 | End: 2020-01-01
Payer: COMMERCIAL

## 2020-01-01 ENCOUNTER — APPOINTMENT (OUTPATIENT)
Dept: MRI IMAGING | Age: 80
End: 2020-01-01
Payer: COMMERCIAL

## 2020-01-01 ENCOUNTER — APPOINTMENT (OUTPATIENT)
Dept: MRI IMAGING | Age: 80
DRG: 092 | End: 2020-01-01
Payer: COMMERCIAL

## 2020-01-01 ENCOUNTER — APPOINTMENT (OUTPATIENT)
Dept: CT IMAGING | Age: 80
End: 2020-01-01
Payer: COMMERCIAL

## 2020-01-01 ENCOUNTER — APPOINTMENT (OUTPATIENT)
Dept: GENERAL RADIOLOGY | Age: 80
End: 2020-01-01
Payer: COMMERCIAL

## 2020-01-01 ENCOUNTER — HOSPITAL ENCOUNTER (OUTPATIENT)
Age: 80
Setting detail: SPECIMEN
Discharge: HOME | End: 2020-01-29
Payer: COMMERCIAL

## 2020-01-01 ENCOUNTER — HOSPITAL ENCOUNTER (OUTPATIENT)
Age: 80
Setting detail: SPECIMEN
Discharge: HOME | End: 2020-02-01
Payer: COMMERCIAL

## 2020-01-01 ENCOUNTER — HOSPITAL ENCOUNTER (INPATIENT)
Age: 80
LOS: 1 days | Discharge: ANOTHER HOSPITAL | DRG: 563 | End: 2020-02-21
Attending: INTERNAL MEDICINE | Admitting: INTERNAL MEDICINE
Payer: COMMERCIAL

## 2020-01-01 ENCOUNTER — APPOINTMENT (OUTPATIENT)
Dept: GENERAL RADIOLOGY | Age: 80
DRG: 563 | End: 2020-01-01
Attending: INTERNAL MEDICINE
Payer: COMMERCIAL

## 2020-01-01 ENCOUNTER — HOSPITAL ENCOUNTER (INPATIENT)
Age: 80
LOS: 10 days | Discharge: NURSING, REHAB OR LONG-TERM CARE | DRG: 560 | End: 2020-03-06
Attending: STUDENT IN AN ORGANIZED HEALTH CARE EDUCATION/TRAINING PROGRAM | Admitting: STUDENT IN AN ORGANIZED HEALTH CARE EDUCATION/TRAINING PROGRAM
Payer: COMMERCIAL

## 2020-01-01 ENCOUNTER — HOSPITAL ENCOUNTER (INPATIENT)
Age: 80
LOS: 3 days | Discharge: NURSING, REHAB OR LONG-TERM CARE | DRG: 092 | End: 2020-02-14
Attending: EMERGENCY MEDICINE | Admitting: INTERNAL MEDICINE
Payer: COMMERCIAL

## 2020-01-01 ENCOUNTER — APPOINTMENT (OUTPATIENT)
Dept: MRI IMAGING | Age: 80
DRG: 563 | End: 2020-01-01
Attending: INTERNAL MEDICINE
Payer: COMMERCIAL

## 2020-01-01 ENCOUNTER — HOSPITAL ENCOUNTER (OUTPATIENT)
Age: 80
Setting detail: OBSERVATION
Discharge: NURSING, REHAB OR LONG-TERM CARE | End: 2020-01-17
Attending: EMERGENCY MEDICINE | Admitting: INTERNAL MEDICINE
Payer: COMMERCIAL

## 2020-01-01 ENCOUNTER — HOSPITAL ENCOUNTER (INPATIENT)
Age: 80
LOS: 4 days | Discharge: NURSING, REHAB OR LONG-TERM CARE | DRG: 563 | End: 2020-02-25
Attending: INTERNAL MEDICINE | Admitting: INTERNAL MEDICINE
Payer: COMMERCIAL

## 2020-01-01 ENCOUNTER — HOSPITAL ENCOUNTER (OUTPATIENT)
Age: 80
Setting detail: SPECIMEN
Discharge: HOME | End: 2020-02-03
Payer: COMMERCIAL

## 2020-01-01 VITALS — HEIGHT: 65 IN | WEIGHT: 200.11 LBS | BODY MASS INDEX: 33.34 KG/M2

## 2020-01-01 VITALS
RESPIRATION RATE: 18 BRPM | OXYGEN SATURATION: 100 % | TEMPERATURE: 97.9 F | BODY MASS INDEX: 33.66 KG/M2 | DIASTOLIC BLOOD PRESSURE: 61 MMHG | HEART RATE: 76 BPM | HEIGHT: 65 IN | SYSTOLIC BLOOD PRESSURE: 107 MMHG | WEIGHT: 202 LBS

## 2020-01-01 VITALS
WEIGHT: 220 LBS | RESPIRATION RATE: 15 BRPM | SYSTOLIC BLOOD PRESSURE: 120 MMHG | OXYGEN SATURATION: 100 % | HEART RATE: 58 BPM | TEMPERATURE: 97.5 F | BODY MASS INDEX: 37.56 KG/M2 | DIASTOLIC BLOOD PRESSURE: 59 MMHG | HEIGHT: 64 IN

## 2020-01-01 VITALS
DIASTOLIC BLOOD PRESSURE: 63 MMHG | WEIGHT: 215 LBS | OXYGEN SATURATION: 99 % | RESPIRATION RATE: 19 BRPM | TEMPERATURE: 98.3 F | HEART RATE: 81 BPM | SYSTOLIC BLOOD PRESSURE: 137 MMHG | HEIGHT: 64 IN | BODY MASS INDEX: 36.7 KG/M2

## 2020-01-01 VITALS
HEIGHT: 65 IN | SYSTOLIC BLOOD PRESSURE: 135 MMHG | BODY MASS INDEX: 33.34 KG/M2 | TEMPERATURE: 98 F | RESPIRATION RATE: 18 BRPM | HEART RATE: 80 BPM | BODY MASS INDEX: 32.02 KG/M2 | WEIGHT: 200.1 LBS | OXYGEN SATURATION: 95 % | RESPIRATION RATE: 16 BRPM | DIASTOLIC BLOOD PRESSURE: 67 MMHG | TEMPERATURE: 97.3 F | WEIGHT: 192.2 LBS | HEIGHT: 65 IN | HEART RATE: 82 BPM | SYSTOLIC BLOOD PRESSURE: 142 MMHG | OXYGEN SATURATION: 100 % | DIASTOLIC BLOOD PRESSURE: 66 MMHG

## 2020-01-01 LAB
ACETYLCHOLINE BINDING ANTIBODY: 0 NMOL/L (ref 0–0.4)
ACETYLCHOLINE BINDING ANTIBODY: NORMAL NMOL/L (ref 0–0.4)
ACETYLCHOLINE BLOCKING AB: 7 % (ref 0–26)
ACETYLCHOLINE BLOCKING AB: NORMAL % (ref 0–26)
ALBUMIN ELP: 3.5 GM/DL (ref 3.2–5.6)
ALBUMIN SERPL-MCNC: 3.3 GM/DL (ref 3.4–5)
ALBUMIN SERPL-MCNC: 3.7 GM/DL (ref 3.4–5)
ALBUMIN SERPL-MCNC: 3.9 GM/DL (ref 3.4–5)
ALBUMIN SERPL-MCNC: 4.3 GM/DL (ref 3.4–5)
ALP BLD-CCNC: 102 IU/L (ref 40–129)
ALP BLD-CCNC: 108 IU/L (ref 40–129)
ALP BLD-CCNC: 125 IU/L (ref 40–129)
ALP BLD-CCNC: 47 IU/L (ref 40–128)
ALP BLD-CCNC: 67 IU/L (ref 40–129)
ALP BLD-CCNC: 79 IU/L (ref 40–128)
ALPHA-1-GLOBULIN: 0.3 GM/DL (ref 0.1–0.4)
ALPHA-2-GLOBULIN: 0.8 GM/DL (ref 0.4–1.2)
ALT SERPL-CCNC: 12 U/L (ref 10–40)
ALT SERPL-CCNC: 13 U/L (ref 10–40)
ALT SERPL-CCNC: 24 U/L (ref 10–40)
ALT SERPL-CCNC: 24 U/L (ref 10–40)
ALT SERPL-CCNC: 27 U/L (ref 10–40)
ALT SERPL-CCNC: 27 U/L (ref 10–40)
AMMONIA: 47 UMOL/L (ref 11–51)
AMYLASE: 75 U/L (ref 25–115)
ANION GAP SERPL CALCULATED.3IONS-SCNC: 14 MMOL/L (ref 4–16)
ANION GAP SERPL CALCULATED.3IONS-SCNC: 14 MMOL/L (ref 4–16)
ANION GAP SERPL CALCULATED.3IONS-SCNC: 15 MMOL/L (ref 4–16)
ANION GAP SERPL CALCULATED.3IONS-SCNC: 16 MMOL/L (ref 4–16)
ANION GAP SERPL CALCULATED.3IONS-SCNC: 16 MMOL/L (ref 4–16)
ANION GAP SERPL CALCULATED.3IONS-SCNC: 17 MMOL/L (ref 4–16)
ANION GAP SERPL CALCULATED.3IONS-SCNC: 18 MMOL/L (ref 4–16)
ANION GAP SERPL CALCULATED.3IONS-SCNC: 18 MMOL/L (ref 4–16)
ANION GAP SERPL CALCULATED.3IONS-SCNC: 19 MMOL/L (ref 4–16)
ANION GAP SERPL CALCULATED.3IONS-SCNC: 20 MMOL/L (ref 4–16)
ANISOCYTOSIS: ABNORMAL
AST SERPL-CCNC: 27 IU/L (ref 15–37)
AST SERPL-CCNC: 37 IU/L (ref 15–37)
AST SERPL-CCNC: 40 IU/L (ref 15–37)
AST SERPL-CCNC: 40 IU/L (ref 15–37)
AST SERPL-CCNC: 49 IU/L (ref 15–37)
AST SERPL-CCNC: 52 IU/L (ref 15–37)
BACTERIA: ABNORMAL /HPF
BACTERIA: NEGATIVE /HPF
BANDED NEUTROPHILS ABSOLUTE COUNT: 0.08 K/CU MM
BANDED NEUTROPHILS RELATIVE PERCENT: 1 % (ref 5–11)
BASOPHILS ABSOLUTE: 0 K/CU MM
BASOPHILS ABSOLUTE: 0.1 K/CU MM
BASOPHILS ABSOLUTE: 0.1 K/CU MM
BASOPHILS RELATIVE PERCENT: 0.3 % (ref 0–1)
BASOPHILS RELATIVE PERCENT: 0.4 % (ref 0–1)
BASOPHILS RELATIVE PERCENT: 0.4 % (ref 0–1)
BASOPHILS RELATIVE PERCENT: 0.5 % (ref 0–1)
BASOPHILS RELATIVE PERCENT: 0.5 % (ref 0–1)
BASOPHILS RELATIVE PERCENT: 0.6 % (ref 0–1)
BASOPHILS RELATIVE PERCENT: 0.7 % (ref 0–1)
BETA GLOBULIN: 1.2 GM/DL (ref 0.5–1.3)
BILIRUB SERPL-MCNC: 0.4 MG/DL (ref 0–1)
BILIRUB SERPL-MCNC: 0.6 MG/DL (ref 0–1)
BILIRUB SERPL-MCNC: 0.7 MG/DL (ref 0–1)
BILIRUB SERPL-MCNC: 0.7 MG/DL (ref 0–1)
BILIRUB SERPL-MCNC: 0.8 MG/DL (ref 0–1)
BILIRUB SERPL-MCNC: 1 MG/DL (ref 0–1)
BILIRUBIN URINE: NEGATIVE MG/DL
BILIRUBIN URINE: NEGATIVE MG/DL
BLOOD, URINE: ABNORMAL
BLOOD, URINE: ABNORMAL
BUN BLDV-MCNC: 15 MG/DL (ref 6–23)
BUN BLDV-MCNC: 16 MG/DL (ref 6–23)
BUN BLDV-MCNC: 17 MG/DL (ref 6–23)
BUN BLDV-MCNC: 17 MG/DL (ref 6–23)
BUN BLDV-MCNC: 18 MG/DL (ref 6–23)
BUN BLDV-MCNC: 19 MG/DL (ref 6–23)
BUN BLDV-MCNC: 20 MG/DL (ref 6–23)
BUN BLDV-MCNC: 20 MG/DL (ref 6–23)
BUN BLDV-MCNC: 22 MG/DL (ref 6–23)
BUN BLDV-MCNC: 25 MG/DL (ref 6–23)
BUN BLDV-MCNC: 27 MG/DL (ref 6–23)
BUN BLDV-MCNC: 37 MG/DL (ref 6–23)
BUN BLDV-MCNC: 48 MG/DL (ref 6–23)
BUN BLDV-MCNC: 52 MG/DL (ref 6–23)
CALCIUM SERPL-MCNC: 8 MG/DL (ref 8.3–10.6)
CALCIUM SERPL-MCNC: 8 MG/DL (ref 8.3–10.6)
CALCIUM SERPL-MCNC: 8.5 MG/DL (ref 8.3–10.6)
CALCIUM SERPL-MCNC: 8.6 MG/DL (ref 8.3–10.6)
CALCIUM SERPL-MCNC: 8.7 MG/DL (ref 8.3–10.6)
CALCIUM SERPL-MCNC: 8.8 MG/DL (ref 8.3–10.6)
CALCIUM SERPL-MCNC: 8.8 MG/DL (ref 8.3–10.6)
CALCIUM SERPL-MCNC: 8.9 MG/DL (ref 8.3–10.6)
CALCIUM SERPL-MCNC: 9 MG/DL (ref 8.3–10.6)
CALCIUM SERPL-MCNC: 9.1 MG/DL (ref 8.3–10.6)
CALCIUM SERPL-MCNC: 9.2 MG/DL (ref 8.3–10.6)
CALCIUM SERPL-MCNC: 9.3 MG/DL (ref 8.3–10.6)
CHLORIDE BLD-SCNC: 101 MMOL/L (ref 99–110)
CHLORIDE BLD-SCNC: 103 MMOL/L (ref 99–110)
CHLORIDE BLD-SCNC: 104 MMOL/L (ref 99–110)
CHLORIDE BLD-SCNC: 106 MMOL/L (ref 99–110)
CHLORIDE BLD-SCNC: 92 MMOL/L (ref 99–110)
CHLORIDE BLD-SCNC: 95 MMOL/L (ref 99–110)
CHLORIDE BLD-SCNC: 95 MMOL/L (ref 99–110)
CHLORIDE BLD-SCNC: 96 MMOL/L (ref 99–110)
CHLORIDE BLD-SCNC: 96 MMOL/L (ref 99–110)
CHLORIDE BLD-SCNC: 97 MMOL/L (ref 99–110)
CLARITY: ABNORMAL
CLARITY: ABNORMAL
CO2: 14 MMOL/L (ref 21–32)
CO2: 16 MMOL/L (ref 21–32)
CO2: 16 MMOL/L (ref 21–32)
CO2: 18 MMOL/L (ref 21–32)
CO2: 18 MMOL/L (ref 21–32)
CO2: 19 MMOL/L (ref 21–32)
CO2: 19 MMOL/L (ref 21–32)
CO2: 20 MMOL/L (ref 21–32)
CO2: 21 MMOL/L (ref 21–32)
CO2: 22 MMOL/L (ref 21–32)
COLOR: YELLOW
COLOR: YELLOW
CREAT SERPL-MCNC: 1.2 MG/DL (ref 0.6–1.1)
CREAT SERPL-MCNC: 1.4 MG/DL (ref 0.6–1.1)
CREAT SERPL-MCNC: 1.4 MG/DL (ref 0.6–1.1)
CREAT SERPL-MCNC: 1.6 MG/DL (ref 0.6–1.1)
CREAT SERPL-MCNC: 1.7 MG/DL (ref 0.6–1.1)
CREAT SERPL-MCNC: 1.9 MG/DL (ref 0.6–1.1)
CREAT SERPL-MCNC: 1.9 MG/DL (ref 0.6–1.1)
CREAT SERPL-MCNC: 2.1 MG/DL (ref 0.6–1.1)
CREAT SERPL-MCNC: 2.1 MG/DL (ref 0.6–1.1)
CREAT SERPL-MCNC: 2.2 MG/DL (ref 0.6–1.1)
CREAT SERPL-MCNC: 2.5 MG/DL (ref 0.6–1.1)
CREAT SERPL-MCNC: 3 MG/DL (ref 0.6–1.1)
CULTURE: ABNORMAL
DIFFERENTIAL TYPE: ABNORMAL
EKG ATRIAL RATE: 77 BPM
EKG ATRIAL RATE: 87 BPM
EKG DIAGNOSIS: NORMAL
EKG DIAGNOSIS: NORMAL
EKG P AXIS: 74 DEGREES
EKG P AXIS: 86 DEGREES
EKG P-R INTERVAL: 160 MS
EKG P-R INTERVAL: 174 MS
EKG Q-T INTERVAL: 366 MS
EKG Q-T INTERVAL: 388 MS
EKG QRS DURATION: 78 MS
EKG QRS DURATION: 86 MS
EKG QTC CALCULATION (BAZETT): 439 MS
EKG QTC CALCULATION (BAZETT): 440 MS
EKG R AXIS: 23 DEGREES
EKG R AXIS: 39 DEGREES
EKG T AXIS: 62 DEGREES
EKG T AXIS: 99 DEGREES
EKG VENTRICULAR RATE: 77 BPM
EKG VENTRICULAR RATE: 87 BPM
EOSINOPHILS ABSOLUTE: 0 K/CU MM
EOSINOPHILS ABSOLUTE: 0.1 K/CU MM
EOSINOPHILS ABSOLUTE: 0.2 K/CU MM
EOSINOPHILS ABSOLUTE: 0.3 K/CU MM
EOSINOPHILS ABSOLUTE: 0.4 K/CU MM
EOSINOPHILS RELATIVE PERCENT: 0.1 % (ref 0–3)
EOSINOPHILS RELATIVE PERCENT: 1 % (ref 0–3)
EOSINOPHILS RELATIVE PERCENT: 1.6 % (ref 0–3)
EOSINOPHILS RELATIVE PERCENT: 2 % (ref 0–3)
EOSINOPHILS RELATIVE PERCENT: 2.3 % (ref 0–3)
EOSINOPHILS RELATIVE PERCENT: 2.8 % (ref 0–3)
EOSINOPHILS RELATIVE PERCENT: 4.4 % (ref 0–3)
EOSINOPHILS RELATIVE PERCENT: 5.7 % (ref 0–3)
ESTIMATED AVERAGE GLUCOSE: 108 MG/DL
FOLATE: <2 NG/ML (ref 3.1–17.5)
GAMMA GLOBULIN: 1.1 GM/DL (ref 0.5–1.6)
GFR AFRICAN AMERICAN: 18 ML/MIN/1.73M2
GFR AFRICAN AMERICAN: 23 ML/MIN/1.73M2
GFR AFRICAN AMERICAN: 26 ML/MIN/1.73M2
GFR AFRICAN AMERICAN: 28 ML/MIN/1.73M2
GFR AFRICAN AMERICAN: 28 ML/MIN/1.73M2
GFR AFRICAN AMERICAN: 31 ML/MIN/1.73M2
GFR AFRICAN AMERICAN: 31 ML/MIN/1.73M2
GFR AFRICAN AMERICAN: 35 ML/MIN/1.73M2
GFR AFRICAN AMERICAN: 38 ML/MIN/1.73M2
GFR AFRICAN AMERICAN: 44 ML/MIN/1.73M2
GFR AFRICAN AMERICAN: 44 ML/MIN/1.73M2
GFR AFRICAN AMERICAN: 53 ML/MIN/1.73M2
GFR NON-AFRICAN AMERICAN: 15 ML/MIN/1.73M2
GFR NON-AFRICAN AMERICAN: 19 ML/MIN/1.73M2
GFR NON-AFRICAN AMERICAN: 22 ML/MIN/1.73M2
GFR NON-AFRICAN AMERICAN: 23 ML/MIN/1.73M2
GFR NON-AFRICAN AMERICAN: 23 ML/MIN/1.73M2
GFR NON-AFRICAN AMERICAN: 26 ML/MIN/1.73M2
GFR NON-AFRICAN AMERICAN: 26 ML/MIN/1.73M2
GFR NON-AFRICAN AMERICAN: 29 ML/MIN/1.73M2
GFR NON-AFRICAN AMERICAN: 31 ML/MIN/1.73M2
GFR NON-AFRICAN AMERICAN: 36 ML/MIN/1.73M2
GFR NON-AFRICAN AMERICAN: 36 ML/MIN/1.73M2
GFR NON-AFRICAN AMERICAN: 43 ML/MIN/1.73M2
GLUCOSE BLD-MCNC: 100 MG/DL (ref 70–99)
GLUCOSE BLD-MCNC: 101 MG/DL (ref 70–99)
GLUCOSE BLD-MCNC: 102 MG/DL (ref 70–99)
GLUCOSE BLD-MCNC: 102 MG/DL (ref 70–99)
GLUCOSE BLD-MCNC: 103 MG/DL (ref 70–99)
GLUCOSE BLD-MCNC: 104 MG/DL (ref 70–99)
GLUCOSE BLD-MCNC: 105 MG/DL (ref 70–99)
GLUCOSE BLD-MCNC: 106 MG/DL (ref 70–99)
GLUCOSE BLD-MCNC: 107 MG/DL (ref 70–99)
GLUCOSE BLD-MCNC: 108 MG/DL (ref 70–99)
GLUCOSE BLD-MCNC: 109 MG/DL (ref 70–99)
GLUCOSE BLD-MCNC: 111 MG/DL (ref 70–99)
GLUCOSE BLD-MCNC: 112 MG/DL (ref 70–99)
GLUCOSE BLD-MCNC: 113 MG/DL (ref 70–99)
GLUCOSE BLD-MCNC: 114 MG/DL (ref 70–99)
GLUCOSE BLD-MCNC: 116 MG/DL (ref 70–99)
GLUCOSE BLD-MCNC: 117 MG/DL (ref 70–99)
GLUCOSE BLD-MCNC: 118 MG/DL (ref 70–99)
GLUCOSE BLD-MCNC: 119 MG/DL (ref 70–99)
GLUCOSE BLD-MCNC: 121 MG/DL (ref 70–99)
GLUCOSE BLD-MCNC: 123 MG/DL (ref 70–99)
GLUCOSE BLD-MCNC: 123 MG/DL (ref 70–99)
GLUCOSE BLD-MCNC: 126 MG/DL (ref 70–99)
GLUCOSE BLD-MCNC: 126 MG/DL (ref 70–99)
GLUCOSE BLD-MCNC: 129 MG/DL (ref 70–99)
GLUCOSE BLD-MCNC: 134 MG/DL (ref 70–99)
GLUCOSE BLD-MCNC: 134 MG/DL (ref 70–99)
GLUCOSE BLD-MCNC: 136 MG/DL (ref 70–99)
GLUCOSE BLD-MCNC: 137 MG/DL (ref 70–99)
GLUCOSE BLD-MCNC: 137 MG/DL (ref 70–99)
GLUCOSE BLD-MCNC: 71 MG/DL (ref 70–99)
GLUCOSE BLD-MCNC: 82 MG/DL (ref 70–99)
GLUCOSE BLD-MCNC: 84 MG/DL (ref 70–99)
GLUCOSE BLD-MCNC: 85 MG/DL (ref 70–99)
GLUCOSE BLD-MCNC: 85 MG/DL (ref 70–99)
GLUCOSE BLD-MCNC: 86 MG/DL (ref 70–99)
GLUCOSE BLD-MCNC: 87 MG/DL (ref 70–99)
GLUCOSE BLD-MCNC: 87 MG/DL (ref 70–99)
GLUCOSE BLD-MCNC: 88 MG/DL (ref 70–99)
GLUCOSE BLD-MCNC: 88 MG/DL (ref 70–99)
GLUCOSE BLD-MCNC: 89 MG/DL (ref 70–99)
GLUCOSE BLD-MCNC: 91 MG/DL (ref 70–99)
GLUCOSE BLD-MCNC: 92 MG/DL (ref 70–99)
GLUCOSE BLD-MCNC: 93 MG/DL (ref 70–99)
GLUCOSE BLD-MCNC: 94 MG/DL (ref 70–99)
GLUCOSE BLD-MCNC: 94 MG/DL (ref 70–99)
GLUCOSE BLD-MCNC: 95 MG/DL (ref 70–99)
GLUCOSE BLD-MCNC: 96 MG/DL (ref 70–99)
GLUCOSE BLD-MCNC: 97 MG/DL (ref 70–99)
GLUCOSE BLD-MCNC: 98 MG/DL (ref 70–99)
GLUCOSE BLD-MCNC: 99 MG/DL (ref 70–99)
GLUCOSE BLD-MCNC: 99 MG/DL (ref 70–99)
GLUCOSE, URINE: NEGATIVE MG/DL
GLUCOSE, URINE: NEGATIVE MG/DL
HBA1C MFR BLD: 5.4 % (ref 4.2–6.3)
HCT VFR BLD CALC: 27.4 % (ref 37–47)
HCT VFR BLD CALC: 29.6 % (ref 37–47)
HCT VFR BLD CALC: 29.8 % (ref 37–47)
HCT VFR BLD CALC: 30.1 % (ref 37–47)
HCT VFR BLD CALC: 30.2 % (ref 37–47)
HCT VFR BLD CALC: 30.4 % (ref 37–47)
HCT VFR BLD CALC: 30.9 % (ref 37–47)
HCT VFR BLD CALC: 31.2 % (ref 37–47)
HCT VFR BLD CALC: 31.6 % (ref 37–47)
HCT VFR BLD CALC: 32.1 % (ref 37–47)
HCT VFR BLD CALC: 33.4 % (ref 37–47)
HEMOGLOBIN: 10.8 GM/DL (ref 12.5–16)
HEMOGLOBIN: 8.8 GM/DL (ref 12.5–16)
HEMOGLOBIN: 9.4 GM/DL (ref 12.5–16)
HEMOGLOBIN: 9.4 GM/DL (ref 12.5–16)
HEMOGLOBIN: 9.5 GM/DL (ref 12.5–16)
HEMOGLOBIN: 9.5 GM/DL (ref 12.5–16)
HEMOGLOBIN: 9.6 GM/DL (ref 12.5–16)
HEMOGLOBIN: 9.7 GM/DL (ref 12.5–16)
HEMOGLOBIN: 9.7 GM/DL (ref 12.5–16)
HEMOGLOBIN: 9.8 GM/DL (ref 12.5–16)
HEMOGLOBIN: 9.8 GM/DL (ref 12.5–16)
HYALINE CASTS: 2 /LPF
IMMATURE NEUTROPHIL %: 0.1 % (ref 0–0.43)
IMMATURE NEUTROPHIL %: 0.1 % (ref 0–0.43)
IMMATURE NEUTROPHIL %: 0.3 % (ref 0–0.43)
IMMATURE NEUTROPHIL %: 0.3 % (ref 0–0.43)
IMMATURE NEUTROPHIL %: 0.4 % (ref 0–0.43)
IMMATURE NEUTROPHIL %: 0.5 % (ref 0–0.43)
IMMATURE NEUTROPHIL %: 0.6 % (ref 0–0.43)
KETONES, URINE: NEGATIVE MG/DL
KETONES, URINE: NEGATIVE MG/DL
LACTATE: 1.8 MMOL/L (ref 0.4–2)
LACTATE: ABNORMAL MMOL/L (ref 0.4–2)
LEUKOCYTE ESTERASE, URINE: ABNORMAL
LEUKOCYTE ESTERASE, URINE: ABNORMAL
LIPASE: 56 IU/L (ref 13–60)
LIPASE: 70 IU/L (ref 13–60)
LYMPHOCYTES ABSOLUTE: 1.4 K/CU MM
LYMPHOCYTES ABSOLUTE: 1.9 K/CU MM
LYMPHOCYTES ABSOLUTE: 2.4 K/CU MM
LYMPHOCYTES ABSOLUTE: 2.6 K/CU MM
LYMPHOCYTES ABSOLUTE: 2.9 K/CU MM
LYMPHOCYTES ABSOLUTE: 3 K/CU MM
LYMPHOCYTES ABSOLUTE: 3 K/CU MM
LYMPHOCYTES ABSOLUTE: 3.2 K/CU MM
LYMPHOCYTES RELATIVE PERCENT: 18.1 % (ref 24–44)
LYMPHOCYTES RELATIVE PERCENT: 24.2 % (ref 24–44)
LYMPHOCYTES RELATIVE PERCENT: 25.5 % (ref 24–44)
LYMPHOCYTES RELATIVE PERCENT: 31.3 % (ref 24–44)
LYMPHOCYTES RELATIVE PERCENT: 33.6 % (ref 24–44)
LYMPHOCYTES RELATIVE PERCENT: 34.2 % (ref 24–44)
LYMPHOCYTES RELATIVE PERCENT: 38 % (ref 24–44)
LYMPHOCYTES RELATIVE PERCENT: 40.1 % (ref 24–44)
Lab: ABNORMAL
MCH RBC QN AUTO: 29.8 PG (ref 27–31)
MCH RBC QN AUTO: 29.8 PG (ref 27–31)
MCH RBC QN AUTO: 30.1 PG (ref 27–31)
MCH RBC QN AUTO: 30.9 PG (ref 27–31)
MCH RBC QN AUTO: 31 PG (ref 27–31)
MCH RBC QN AUTO: 31 PG (ref 27–31)
MCH RBC QN AUTO: 31.2 PG (ref 27–31)
MCH RBC QN AUTO: 31.3 PG (ref 27–31)
MCH RBC QN AUTO: 31.5 PG (ref 27–31)
MCHC RBC AUTO-ENTMCNC: 29.3 % (ref 32–36)
MCHC RBC AUTO-ENTMCNC: 30.1 % (ref 32–36)
MCHC RBC AUTO-ENTMCNC: 30.4 % (ref 32–36)
MCHC RBC AUTO-ENTMCNC: 31.4 % (ref 32–36)
MCHC RBC AUTO-ENTMCNC: 31.6 % (ref 32–36)
MCHC RBC AUTO-ENTMCNC: 32.1 % (ref 32–36)
MCHC RBC AUTO-ENTMCNC: 32.2 % (ref 32–36)
MCHC RBC AUTO-ENTMCNC: 32.3 % (ref 32–36)
MCHC RBC AUTO-ENTMCNC: 32.9 % (ref 32–36)
MCV RBC AUTO: 101.6 FL (ref 78–100)
MCV RBC AUTO: 102.6 FL (ref 78–100)
MCV RBC AUTO: 107 FL (ref 78–100)
MCV RBC AUTO: 92 FL (ref 78–100)
MCV RBC AUTO: 93.8 FL (ref 78–100)
MCV RBC AUTO: 95.1 FL (ref 78–100)
MCV RBC AUTO: 95.8 FL (ref 78–100)
MCV RBC AUTO: 96.2 FL (ref 78–100)
MCV RBC AUTO: 96.7 FL (ref 78–100)
MCV RBC AUTO: 97.2 FL (ref 78–100)
MCV RBC AUTO: 98 FL (ref 78–100)
MONOCYTES ABSOLUTE: 0.7 K/CU MM
MONOCYTES ABSOLUTE: 0.8 K/CU MM
MONOCYTES ABSOLUTE: 0.9 K/CU MM
MONOCYTES ABSOLUTE: 1 K/CU MM
MONOCYTES ABSOLUTE: 1 K/CU MM
MONOCYTES ABSOLUTE: 1.1 K/CU MM
MONOCYTES ABSOLUTE: 1.2 K/CU MM
MONOCYTES ABSOLUTE: 1.2 K/CU MM
MONOCYTES RELATIVE PERCENT: 10.2 % (ref 0–4)
MONOCYTES RELATIVE PERCENT: 10.4 % (ref 0–4)
MONOCYTES RELATIVE PERCENT: 11.6 % (ref 0–4)
MONOCYTES RELATIVE PERCENT: 12.7 % (ref 0–4)
MONOCYTES RELATIVE PERCENT: 12.8 % (ref 0–4)
MONOCYTES RELATIVE PERCENT: 13 % (ref 0–4)
MONOCYTES RELATIVE PERCENT: 14 % (ref 0–4)
MONOCYTES RELATIVE PERCENT: 8.7 % (ref 0–4)
MUCUS: ABNORMAL HPF
NITRITE URINE, QUANTITATIVE: NEGATIVE
NITRITE URINE, QUANTITATIVE: NEGATIVE
NUCLEATED RBC %: 0 %
NUCLEATED RBC %: 0.4 %
PDW BLD-RTO: 15.8 % (ref 11.7–14.9)
PDW BLD-RTO: 15.9 % (ref 11.7–14.9)
PDW BLD-RTO: 16.3 % (ref 11.7–14.9)
PDW BLD-RTO: 16.5 % (ref 11.7–14.9)
PDW BLD-RTO: 16.8 % (ref 11.7–14.9)
PDW BLD-RTO: 16.9 % (ref 11.7–14.9)
PDW BLD-RTO: 17.1 % (ref 11.7–14.9)
PDW BLD-RTO: 17.2 % (ref 11.7–14.9)
PDW BLD-RTO: 17.3 % (ref 11.7–14.9)
PDW BLD-RTO: 17.3 % (ref 11.7–14.9)
PDW BLD-RTO: 18 % (ref 11.7–14.9)
PH, URINE: 5 (ref 5–8)
PH, URINE: 5 (ref 5–8)
PLATELET # BLD: 199 K/CU MM (ref 140–440)
PLATELET # BLD: 216 K/CU MM (ref 140–440)
PLATELET # BLD: 251 K/CU MM (ref 140–440)
PLATELET # BLD: 259 K/CU MM (ref 140–440)
PLATELET # BLD: 261 K/CU MM (ref 140–440)
PLATELET # BLD: 297 K/CU MM (ref 140–440)
PLATELET # BLD: 331 K/CU MM (ref 140–440)
PLATELET # BLD: 333 K/CU MM (ref 140–440)
PLATELET # BLD: 351 K/CU MM (ref 140–440)
PLATELET # BLD: 360 K/CU MM (ref 140–440)
PLATELET # BLD: 361 K/CU MM (ref 140–440)
PMV BLD AUTO: 10.3 FL (ref 7.5–11.1)
PMV BLD AUTO: 10.4 FL (ref 7.5–11.1)
PMV BLD AUTO: 10.4 FL (ref 7.5–11.1)
PMV BLD AUTO: 10.8 FL (ref 7.5–11.1)
PMV BLD AUTO: 10.9 FL (ref 7.5–11.1)
PMV BLD AUTO: 11.1 FL (ref 7.5–11.1)
PMV BLD AUTO: 11.2 FL (ref 7.5–11.1)
PMV BLD AUTO: 11.2 FL (ref 7.5–11.1)
PMV BLD AUTO: 11.3 FL (ref 7.5–11.1)
PMV BLD AUTO: 11.6 FL (ref 7.5–11.1)
PMV BLD AUTO: 11.8 FL (ref 7.5–11.1)
POTASSIUM SERPL-SCNC: 3.2 MMOL/L (ref 3.5–5.1)
POTASSIUM SERPL-SCNC: 3.4 MMOL/L (ref 3.5–5.1)
POTASSIUM SERPL-SCNC: 3.4 MMOL/L (ref 3.5–5.1)
POTASSIUM SERPL-SCNC: 3.6 MMOL/L (ref 3.5–5.1)
POTASSIUM SERPL-SCNC: 3.6 MMOL/L (ref 3.5–5.1)
POTASSIUM SERPL-SCNC: 3.8 MMOL/L (ref 3.5–5.1)
POTASSIUM SERPL-SCNC: 3.8 MMOL/L (ref 3.5–5.1)
POTASSIUM SERPL-SCNC: 3.9 MMOL/L (ref 3.5–5.1)
POTASSIUM SERPL-SCNC: 4 MMOL/L (ref 3.5–5.1)
POTASSIUM SERPL-SCNC: 4.1 MMOL/L (ref 3.5–5.1)
POTASSIUM SERPL-SCNC: 4.1 MMOL/L (ref 3.5–5.1)
POTASSIUM SERPL-SCNC: 4.9 MMOL/L (ref 3.5–5.1)
POTASSIUM SERPL-SCNC: 5.1 MMOL/L (ref 3.5–5.1)
POTASSIUM SERPL-SCNC: ABNORMAL MMOL/L (ref 3.5–5.1)
PRO-BNP: 566.5 PG/ML
PROTEIN UA: 100 MG/DL
PROTEIN UA: NEGATIVE MG/DL
RAPID INFLUENZA  B AGN: NEGATIVE
RAPID INFLUENZA A AGN: NEGATIVE
RBC # BLD: 2.82 M/CU MM (ref 4.2–5.4)
RBC # BLD: 3 M/CU MM (ref 4.2–5.4)
RBC # BLD: 3.04 M/CU MM (ref 4.2–5.4)
RBC # BLD: 3.06 M/CU MM (ref 4.2–5.4)
RBC # BLD: 3.07 M/CU MM (ref 4.2–5.4)
RBC # BLD: 3.11 M/CU MM (ref 4.2–5.4)
RBC # BLD: 3.11 M/CU MM (ref 4.2–5.4)
RBC # BLD: 3.16 M/CU MM (ref 4.2–5.4)
RBC # BLD: 3.22 M/CU MM (ref 4.2–5.4)
RBC # BLD: 3.25 M/CU MM (ref 4.2–5.4)
RBC # BLD: 3.63 M/CU MM (ref 4.2–5.4)
RBC # BLD: ABNORMAL 10*6/UL
RBC URINE: 138 /HPF (ref 0–6)
RBC URINE: 8 /HPF (ref 0–6)
RENAL EPITHELIAL, UA: 2 /HPF
SEGMENTED NEUTROPHILS ABSOLUTE COUNT: 3.1 K/CU MM
SEGMENTED NEUTROPHILS ABSOLUTE COUNT: 3.7 K/CU MM
SEGMENTED NEUTROPHILS ABSOLUTE COUNT: 3.8 K/CU MM
SEGMENTED NEUTROPHILS ABSOLUTE COUNT: 4.5 K/CU MM
SEGMENTED NEUTROPHILS ABSOLUTE COUNT: 4.6 K/CU MM
SEGMENTED NEUTROPHILS ABSOLUTE COUNT: 4.8 K/CU MM
SEGMENTED NEUTROPHILS ABSOLUTE COUNT: 5.6 K/CU MM
SEGMENTED NEUTROPHILS ABSOLUTE COUNT: 6.2 K/CU MM
SEGMENTED NEUTROPHILS RELATIVE PERCENT: 41.9 % (ref 36–66)
SEGMENTED NEUTROPHILS RELATIVE PERCENT: 46 % (ref 36–66)
SEGMENTED NEUTROPHILS RELATIVE PERCENT: 49.6 % (ref 36–66)
SEGMENTED NEUTROPHILS RELATIVE PERCENT: 50.2 % (ref 36–66)
SEGMENTED NEUTROPHILS RELATIVE PERCENT: 52.8 % (ref 36–66)
SEGMENTED NEUTROPHILS RELATIVE PERCENT: 60.6 % (ref 36–66)
SEGMENTED NEUTROPHILS RELATIVE PERCENT: 61.6 % (ref 36–66)
SEGMENTED NEUTROPHILS RELATIVE PERCENT: 72.3 % (ref 36–66)
SODIUM BLD-SCNC: 129 MMOL/L (ref 135–145)
SODIUM BLD-SCNC: 130 MMOL/L (ref 135–145)
SODIUM BLD-SCNC: 131 MMOL/L (ref 135–145)
SODIUM BLD-SCNC: 132 MMOL/L (ref 135–145)
SODIUM BLD-SCNC: 133 MMOL/L (ref 135–145)
SODIUM BLD-SCNC: 135 MMOL/L (ref 135–145)
SODIUM BLD-SCNC: 137 MMOL/L (ref 135–145)
SODIUM BLD-SCNC: 137 MMOL/L (ref 135–145)
SODIUM BLD-SCNC: 139 MMOL/L (ref 135–145)
SODIUM BLD-SCNC: 142 MMOL/L (ref 135–145)
SPECIFIC GRAVITY UA: 1.01 (ref 1–1.03)
SPECIFIC GRAVITY UA: 1.01 (ref 1–1.03)
SPECIMEN: ABNORMAL
SPEP INTERPRETATION: NORMAL
SQUAMOUS EPITHELIAL: 10 /HPF
STRIATED MUSCLE AB, IGG SCREEN: NORMAL
STRIATED MUSCLE AB, IGG SCREEN: NORMAL
TITIN ANTIBODY: 0.1 IV (ref 0–0.45)
TITIN ANTIBODY: NORMAL IV (ref 0–0.45)
TOTAL CK: 104 IU/L (ref 26–140)
TOTAL COLONY COUNT: ABNORMAL
TOTAL IMMATURE NEUTOROPHIL: 0.01 K/CU MM
TOTAL IMMATURE NEUTOROPHIL: 0.01 K/CU MM
TOTAL IMMATURE NEUTOROPHIL: 0.03 K/CU MM
TOTAL IMMATURE NEUTOROPHIL: 0.04 K/CU MM
TOTAL IMMATURE NEUTOROPHIL: 0.05 K/CU MM
TOTAL NUCLEATED RBC: 0 K/CU MM
TOTAL PROTEIN: 6.7 GM/DL (ref 6.4–8.2)
TOTAL PROTEIN: 6.8 GM/DL (ref 6.4–8.2)
TOTAL PROTEIN: 6.9 GM/DL (ref 6.4–8.2)
TOTAL PROTEIN: 7.6 GM/DL (ref 6.4–8.2)
TOTAL PROTEIN: 8 GM/DL (ref 6.4–8.2)
TOTAL RETICULOCYTE COUNT: 0.04 K/CU MM
TRICHOMONAS: ABNORMAL /HPF
TRICHOMONAS: ABNORMAL /HPF
TROPONIN T: 0.04 NG/ML
TROPONIN T: <0.01 NG/ML
TSH HIGH SENSITIVITY: 0.91 UIU/ML (ref 0.27–4.2)
UNCLASSIFIED CRYSTAL: ABNORMAL /HPF
UROBILINOGEN, URINE: NORMAL MG/DL (ref 0.2–1)
UROBILINOGEN, URINE: NORMAL MG/DL (ref 0.2–1)
VITAMIN B-12: 1786 PG/ML (ref 211–911)
WBC # BLD: 10.2 K/CU MM (ref 4–10.5)
WBC # BLD: 7.5 K/CU MM (ref 4–10.5)
WBC # BLD: 7.7 K/CU MM (ref 4–10.5)
WBC # BLD: 7.8 K/CU MM (ref 4–10.5)
WBC # BLD: 7.8 K/CU MM (ref 4–10.5)
WBC # BLD: 8 K/CU MM (ref 4–10.5)
WBC # BLD: 8.3 K/CU MM (ref 4–10.5)
WBC # BLD: 8.3 K/CU MM (ref 4–10.5)
WBC # BLD: 8.5 K/CU MM (ref 4–10.5)
WBC # BLD: 9.2 K/CU MM (ref 4–10.5)
WBC # BLD: 9.3 K/CU MM (ref 4–10.5)
WBC CLUMP: ABNORMAL /HPF
WBC CLUMP: ABNORMAL /HPF
WBC UA: 175 /HPF (ref 0–5)
WBC UA: 2179 /HPF (ref 0–5)
YEAST: ABNORMAL /HPF

## 2020-01-01 PROCEDURE — 6370000000 HC RX 637 (ALT 250 FOR IP): Performed by: INTERNAL MEDICINE

## 2020-01-01 PROCEDURE — 82962 GLUCOSE BLOOD TEST: CPT

## 2020-01-01 PROCEDURE — 82746 ASSAY OF FOLIC ACID SERUM: CPT

## 2020-01-01 PROCEDURE — 80048 BASIC METABOLIC PNL TOTAL CA: CPT

## 2020-01-01 PROCEDURE — 93010 ELECTROCARDIOGRAM REPORT: CPT | Performed by: INTERNAL MEDICINE

## 2020-01-01 PROCEDURE — 97530 THERAPEUTIC ACTIVITIES: CPT

## 2020-01-01 PROCEDURE — 97167 OT EVAL HIGH COMPLEX 60 MIN: CPT

## 2020-01-01 PROCEDURE — 70553 MRI BRAIN STEM W/O & W/DYE: CPT

## 2020-01-01 PROCEDURE — 94761 N-INVAS EAR/PLS OXIMETRY MLT: CPT

## 2020-01-01 PROCEDURE — 80053 COMPREHEN METABOLIC PANEL: CPT

## 2020-01-01 PROCEDURE — 97110 THERAPEUTIC EXERCISES: CPT

## 2020-01-01 PROCEDURE — 1200000002 HC SEMI PRIVATE SWING BED

## 2020-01-01 PROCEDURE — 1200000000 HC SEMI PRIVATE

## 2020-01-01 PROCEDURE — 97166 OT EVAL MOD COMPLEX 45 MIN: CPT

## 2020-01-01 PROCEDURE — G0378 HOSPITAL OBSERVATION PER HR: HCPCS

## 2020-01-01 PROCEDURE — 93005 ELECTROCARDIOGRAM TRACING: CPT | Performed by: PHYSICIAN ASSISTANT

## 2020-01-01 PROCEDURE — 72148 MRI LUMBAR SPINE W/O DYE: CPT

## 2020-01-01 PROCEDURE — 85025 COMPLETE CBC W/AUTO DIFF WBC: CPT

## 2020-01-01 PROCEDURE — 96372 THER/PROPH/DIAG INJ SC/IM: CPT

## 2020-01-01 PROCEDURE — 6370000000 HC RX 637 (ALT 250 FOR IP): Performed by: STUDENT IN AN ORGANIZED HEALTH CARE EDUCATION/TRAINING PROGRAM

## 2020-01-01 PROCEDURE — 73590 X-RAY EXAM OF LOWER LEG: CPT

## 2020-01-01 PROCEDURE — 6360000002 HC RX W HCPCS: Performed by: INTERNAL MEDICINE

## 2020-01-01 PROCEDURE — 2580000003 HC RX 258: Performed by: INTERNAL MEDICINE

## 2020-01-01 PROCEDURE — 36415 COLL VENOUS BLD VENIPUNCTURE: CPT

## 2020-01-01 PROCEDURE — 96361 HYDRATE IV INFUSION ADD-ON: CPT

## 2020-01-01 PROCEDURE — 85027 COMPLETE CBC AUTOMATED: CPT

## 2020-01-01 PROCEDURE — 82607 VITAMIN B-12: CPT

## 2020-01-01 PROCEDURE — 87086 URINE CULTURE/COLONY COUNT: CPT

## 2020-01-01 PROCEDURE — 73562 X-RAY EXAM OF KNEE 3: CPT

## 2020-01-01 PROCEDURE — 83036 HEMOGLOBIN GLYCOSYLATED A1C: CPT

## 2020-01-01 PROCEDURE — G0379 DIRECT REFER HOSPITAL OBSERV: HCPCS

## 2020-01-01 PROCEDURE — 99285 EMERGENCY DEPT VISIT HI MDM: CPT

## 2020-01-01 PROCEDURE — 97116 GAIT TRAINING THERAPY: CPT

## 2020-01-01 PROCEDURE — 71045 X-RAY EXAM CHEST 1 VIEW: CPT

## 2020-01-01 PROCEDURE — 97535 SELF CARE MNGMENT TRAINING: CPT

## 2020-01-01 PROCEDURE — 87077 CULTURE AEROBIC IDENTIFY: CPT

## 2020-01-01 PROCEDURE — 70551 MRI BRAIN STEM W/O DYE: CPT

## 2020-01-01 PROCEDURE — 70450 CT HEAD/BRAIN W/O DYE: CPT

## 2020-01-01 PROCEDURE — 83880 ASSAY OF NATRIURETIC PEPTIDE: CPT

## 2020-01-01 PROCEDURE — 6370000000 HC RX 637 (ALT 250 FOR IP): Performed by: NURSE PRACTITIONER

## 2020-01-01 PROCEDURE — 6360000002 HC RX W HCPCS: Performed by: NURSE PRACTITIONER

## 2020-01-01 PROCEDURE — 81001 URINALYSIS AUTO W/SCOPE: CPT

## 2020-01-01 PROCEDURE — 97162 PT EVAL MOD COMPLEX 30 MIN: CPT

## 2020-01-01 PROCEDURE — 82550 ASSAY OF CK (CPK): CPT

## 2020-01-01 PROCEDURE — 99223 1ST HOSP IP/OBS HIGH 75: CPT | Performed by: PSYCHIATRY & NEUROLOGY

## 2020-01-01 PROCEDURE — 87186 SC STD MICRODIL/AGAR DIL: CPT

## 2020-01-01 PROCEDURE — 83690 ASSAY OF LIPASE: CPT

## 2020-01-01 PROCEDURE — 83605 ASSAY OF LACTIC ACID: CPT

## 2020-01-01 PROCEDURE — 84484 ASSAY OF TROPONIN QUANT: CPT

## 2020-01-01 PROCEDURE — 97112 NEUROMUSCULAR REEDUCATION: CPT

## 2020-01-01 PROCEDURE — 99233 SBSQ HOSP IP/OBS HIGH 50: CPT | Performed by: NURSE PRACTITIONER

## 2020-01-01 PROCEDURE — 83519 RIA NONANTIBODY: CPT

## 2020-01-01 PROCEDURE — 96360 HYDRATION IV INFUSION INIT: CPT

## 2020-01-01 PROCEDURE — 99233 SBSQ HOSP IP/OBS HIGH 50: CPT | Performed by: INTERNAL MEDICINE

## 2020-01-01 PROCEDURE — 93005 ELECTROCARDIOGRAM TRACING: CPT | Performed by: EMERGENCY MEDICINE

## 2020-01-01 PROCEDURE — 82140 ASSAY OF AMMONIA: CPT

## 2020-01-01 PROCEDURE — 86255 FLUORESCENT ANTIBODY SCREEN: CPT

## 2020-01-01 PROCEDURE — 97163 PT EVAL HIGH COMPLEX 45 MIN: CPT

## 2020-01-01 PROCEDURE — 99211 OFF/OP EST MAY X REQ PHY/QHP: CPT

## 2020-01-01 PROCEDURE — 6360000004 HC RX CONTRAST MEDICATION: Performed by: INTERNAL MEDICINE

## 2020-01-01 PROCEDURE — 72141 MRI NECK SPINE W/O DYE: CPT

## 2020-01-01 PROCEDURE — A9577 INJ MULTIHANCE: HCPCS | Performed by: INTERNAL MEDICINE

## 2020-01-01 PROCEDURE — 82150 ASSAY OF AMYLASE: CPT

## 2020-01-01 PROCEDURE — 6370000000 HC RX 637 (ALT 250 FOR IP): Performed by: PHYSICIAN ASSISTANT

## 2020-01-01 PROCEDURE — 2580000003 HC RX 258: Performed by: EMERGENCY MEDICINE

## 2020-01-01 PROCEDURE — 99232 SBSQ HOSP IP/OBS MODERATE 35: CPT | Performed by: INTERNAL MEDICINE

## 2020-01-01 PROCEDURE — 84165 PROTEIN E-PHORESIS SERUM: CPT

## 2020-01-01 PROCEDURE — 6370000000 HC RX 637 (ALT 250 FOR IP): Performed by: EMERGENCY MEDICINE

## 2020-01-01 PROCEDURE — 87804 INFLUENZA ASSAY W/OPTIC: CPT

## 2020-01-01 PROCEDURE — 71250 CT THORAX DX C-: CPT

## 2020-01-01 PROCEDURE — 96374 THER/PROPH/DIAG INJ IV PUSH: CPT

## 2020-01-01 PROCEDURE — 85007 BL SMEAR W/DIFF WBC COUNT: CPT

## 2020-01-01 PROCEDURE — 84443 ASSAY THYROID STIM HORMONE: CPT

## 2020-01-01 RX ORDER — PANTOPRAZOLE SODIUM 40 MG/1
40 TABLET, DELAYED RELEASE ORAL
Status: CANCELLED | OUTPATIENT
Start: 2020-01-01

## 2020-01-01 RX ORDER — LANOLIN ALCOHOL/MO/W.PET/CERES
3 CREAM (GRAM) TOPICAL NIGHTLY PRN
Status: CANCELLED | OUTPATIENT
Start: 2020-01-01

## 2020-01-01 RX ORDER — MIRTAZAPINE 15 MG/1
15 TABLET, ORALLY DISINTEGRATING ORAL NIGHTLY
Qty: 30 TABLET | Refills: 3 | DISCHARGE
Start: 2020-01-01 | End: 2020-01-01

## 2020-01-01 RX ORDER — SODIUM CHLORIDE 0.9 % (FLUSH) 0.9 %
10 SYRINGE (ML) INJECTION EVERY 12 HOURS SCHEDULED
Status: DISCONTINUED | OUTPATIENT
Start: 2020-01-01 | End: 2020-01-01 | Stop reason: HOSPADM

## 2020-01-01 RX ORDER — PREDNISONE 20 MG/1
10 TABLET ORAL DAILY
Qty: 5 TABLET | Refills: 0 | Status: SHIPPED | OUTPATIENT
Start: 2020-01-01 | End: 2020-01-01

## 2020-01-01 RX ORDER — MIRTAZAPINE 30 MG/1
15 TABLET, FILM COATED ORAL NIGHTLY
Status: DISCONTINUED | OUTPATIENT
Start: 2020-01-01 | End: 2020-01-01

## 2020-01-01 RX ORDER — SODIUM CHLORIDE 9 MG/ML
INJECTION, SOLUTION INTRAVENOUS CONTINUOUS
Status: DISCONTINUED | OUTPATIENT
Start: 2020-01-01 | End: 2020-01-01 | Stop reason: HOSPADM

## 2020-01-01 RX ORDER — PREDNISONE 10 MG/1
10 TABLET ORAL DAILY
Status: CANCELLED | OUTPATIENT
Start: 2020-01-01 | End: 2020-01-01

## 2020-01-01 RX ORDER — SODIUM CHLORIDE 9 MG/ML
INJECTION, SOLUTION INTRAVENOUS CONTINUOUS
Status: DISCONTINUED | OUTPATIENT
Start: 2020-01-01 | End: 2020-01-01

## 2020-01-01 RX ORDER — MIRTAZAPINE 15 MG/1
45 TABLET, FILM COATED ORAL NIGHTLY
Status: DISCONTINUED | OUTPATIENT
Start: 2020-01-01 | End: 2020-01-01 | Stop reason: HOSPADM

## 2020-01-01 RX ORDER — CYANOCOBALAMIN 1000 UG/ML
1000 INJECTION INTRAMUSCULAR; SUBCUTANEOUS ONCE
Status: COMPLETED | OUTPATIENT
Start: 2020-01-01 | End: 2020-01-01

## 2020-01-01 RX ORDER — PREDNISONE 10 MG/1
10 TABLET ORAL DAILY
Status: DISCONTINUED | OUTPATIENT
Start: 2020-01-01 | End: 2020-01-01 | Stop reason: HOSPADM

## 2020-01-01 RX ORDER — FOLIC ACID 1 MG/1
1 TABLET ORAL DAILY
COMMUNITY

## 2020-01-01 RX ORDER — SODIUM CHLORIDE 0.9 % (FLUSH) 0.9 %
10 SYRINGE (ML) INJECTION PRN
Status: CANCELLED | OUTPATIENT
Start: 2020-01-01

## 2020-01-01 RX ORDER — METOPROLOL TARTRATE 50 MG/1
100 TABLET, FILM COATED ORAL 2 TIMES DAILY
Status: CANCELLED | OUTPATIENT
Start: 2020-01-01

## 2020-01-01 RX ORDER — FOLIC ACID 1 MG/1
1 TABLET ORAL DAILY
Status: DISCONTINUED | OUTPATIENT
Start: 2020-01-01 | End: 2020-01-01

## 2020-01-01 RX ORDER — MIRTAZAPINE 45 MG/1
45 TABLET, FILM COATED ORAL NIGHTLY
Qty: 30 TABLET | Refills: 3 | Status: SHIPPED | OUTPATIENT
Start: 2020-01-01

## 2020-01-01 RX ORDER — ONDANSETRON 2 MG/ML
4 INJECTION INTRAMUSCULAR; INTRAVENOUS EVERY 6 HOURS PRN
Status: DISCONTINUED | OUTPATIENT
Start: 2020-01-01 | End: 2020-01-01 | Stop reason: HOSPADM

## 2020-01-01 RX ORDER — LANOLIN ALCOHOL/MO/W.PET/CERES
3 CREAM (GRAM) TOPICAL NIGHTLY PRN
Status: DISCONTINUED | OUTPATIENT
Start: 2020-01-01 | End: 2020-01-01 | Stop reason: HOSPADM

## 2020-01-01 RX ORDER — PANTOPRAZOLE SODIUM 40 MG/1
40 TABLET, DELAYED RELEASE ORAL
Status: DISCONTINUED | OUTPATIENT
Start: 2020-01-01 | End: 2020-01-01 | Stop reason: HOSPADM

## 2020-01-01 RX ORDER — ACETAMINOPHEN 325 MG/1
650 TABLET ORAL EVERY 6 HOURS PRN
Status: CANCELLED | OUTPATIENT
Start: 2020-01-01

## 2020-01-01 RX ORDER — ACETAMINOPHEN 325 MG/1
650 TABLET ORAL EVERY 6 HOURS PRN
Status: DISCONTINUED | OUTPATIENT
Start: 2020-01-01 | End: 2020-01-01

## 2020-01-01 RX ORDER — METOPROLOL TARTRATE 50 MG/1
100 TABLET, FILM COATED ORAL 2 TIMES DAILY
Status: DISCONTINUED | OUTPATIENT
Start: 2020-01-01 | End: 2020-01-01

## 2020-01-01 RX ORDER — DIPHENHYDRAMINE HYDROCHLORIDE 50 MG/ML
25 INJECTION INTRAMUSCULAR; INTRAVENOUS ONCE
Status: CANCELLED | OUTPATIENT
Start: 2020-01-01

## 2020-01-01 RX ORDER — PREDNISONE 10 MG/1
10 TABLET ORAL DAILY
Status: DISCONTINUED | OUTPATIENT
Start: 2020-01-01 | End: 2020-01-01

## 2020-01-01 RX ORDER — DEXTROSE MONOHYDRATE 50 MG/ML
100 INJECTION, SOLUTION INTRAVENOUS PRN
Status: DISCONTINUED | OUTPATIENT
Start: 2020-01-01 | End: 2020-01-01 | Stop reason: HOSPADM

## 2020-01-01 RX ORDER — PREDNISONE 20 MG/1
20 TABLET ORAL DAILY
Status: DISCONTINUED | OUTPATIENT
Start: 2020-01-01 | End: 2020-01-01 | Stop reason: HOSPADM

## 2020-01-01 RX ORDER — POTASSIUM CHLORIDE 20 MEQ/1
40 TABLET, EXTENDED RELEASE ORAL ONCE
Status: COMPLETED | OUTPATIENT
Start: 2020-01-01 | End: 2020-01-01

## 2020-01-01 RX ORDER — ASCORBIC ACID
1000 CRYSTALS ORAL DAILY
Status: CANCELLED | OUTPATIENT
Start: 2020-01-01

## 2020-01-01 RX ORDER — DEXTROSE MONOHYDRATE 25 G/50ML
12.5 INJECTION, SOLUTION INTRAVENOUS PRN
Status: DISCONTINUED | OUTPATIENT
Start: 2020-01-01 | End: 2020-01-01 | Stop reason: HOSPADM

## 2020-01-01 RX ORDER — MIRTAZAPINE 30 MG/1
15 TABLET, FILM COATED ORAL NIGHTLY
Status: DISCONTINUED | OUTPATIENT
Start: 2020-01-01 | End: 2020-01-01 | Stop reason: HOSPADM

## 2020-01-01 RX ORDER — ONDANSETRON 4 MG/1
4 TABLET, ORALLY DISINTEGRATING ORAL EVERY 8 HOURS PRN
Status: CANCELLED | OUTPATIENT
Start: 2020-01-01

## 2020-01-01 RX ORDER — ACETAMINOPHEN 325 MG/1
650 TABLET ORAL EVERY 6 HOURS PRN
Status: DISCONTINUED | OUTPATIENT
Start: 2020-01-01 | End: 2020-01-01 | Stop reason: HOSPADM

## 2020-01-01 RX ORDER — SODIUM CHLORIDE 0.9 % (FLUSH) 0.9 %
10 SYRINGE (ML) INJECTION PRN
Status: DISCONTINUED | OUTPATIENT
Start: 2020-01-01 | End: 2020-01-01 | Stop reason: HOSPADM

## 2020-01-01 RX ORDER — LISINOPRIL 2.5 MG/1
2.5 TABLET ORAL DAILY
Status: CANCELLED | OUTPATIENT
Start: 2020-01-01

## 2020-01-01 RX ORDER — TRAMADOL HYDROCHLORIDE 50 MG/1
50 TABLET ORAL EVERY 6 HOURS PRN
Qty: 12 TABLET | Refills: 0 | Status: SHIPPED | OUTPATIENT
Start: 2020-01-01 | End: 2020-01-01

## 2020-01-01 RX ORDER — SODIUM CHLORIDE 0.9 % (FLUSH) 0.9 %
10 SYRINGE (ML) INJECTION EVERY 12 HOURS SCHEDULED
Status: DISCONTINUED | OUTPATIENT
Start: 2020-01-01 | End: 2020-01-01

## 2020-01-01 RX ORDER — METOPROLOL TARTRATE 50 MG/1
100 TABLET, FILM COATED ORAL 2 TIMES DAILY
Status: DISCONTINUED | OUTPATIENT
Start: 2020-01-01 | End: 2020-01-01 | Stop reason: HOSPADM

## 2020-01-01 RX ORDER — FOLIC ACID 1 MG/1
1 TABLET ORAL DAILY
Status: DISCONTINUED | OUTPATIENT
Start: 2020-01-01 | End: 2020-01-01 | Stop reason: HOSPADM

## 2020-01-01 RX ORDER — ONDANSETRON 4 MG/1
4 TABLET, ORALLY DISINTEGRATING ORAL ONCE
Status: COMPLETED | OUTPATIENT
Start: 2020-01-01 | End: 2020-01-01

## 2020-01-01 RX ORDER — DIPHENHYDRAMINE HYDROCHLORIDE 50 MG/ML
25 INJECTION INTRAMUSCULAR; INTRAVENOUS ONCE
Status: COMPLETED | OUTPATIENT
Start: 2020-01-01 | End: 2020-01-01

## 2020-01-01 RX ORDER — ONDANSETRON 4 MG/1
4 TABLET, ORALLY DISINTEGRATING ORAL EVERY 8 HOURS PRN
Status: DISCONTINUED | OUTPATIENT
Start: 2020-01-01 | End: 2020-01-01 | Stop reason: HOSPADM

## 2020-01-01 RX ORDER — POTASSIUM CHLORIDE 750 MG/1
10 TABLET, EXTENDED RELEASE ORAL DAILY
Qty: 180 TABLET | Refills: 1 | DISCHARGE
Start: 2020-01-01 | End: 2020-01-01

## 2020-01-01 RX ORDER — MIRTAZAPINE 30 MG/1
30 TABLET, FILM COATED ORAL NIGHTLY
Status: DISCONTINUED | OUTPATIENT
Start: 2020-01-01 | End: 2020-01-01

## 2020-01-01 RX ORDER — SUCRALFATE 1 G/1
1 TABLET ORAL 4 TIMES DAILY
Status: DISCONTINUED | OUTPATIENT
Start: 2020-01-01 | End: 2020-01-01 | Stop reason: HOSPADM

## 2020-01-01 RX ORDER — ACETAMINOPHEN 500 MG
500 TABLET ORAL EVERY 4 HOURS PRN
Status: DISCONTINUED | OUTPATIENT
Start: 2020-01-01 | End: 2020-01-01 | Stop reason: HOSPADM

## 2020-01-01 RX ORDER — SODIUM CHLORIDE 0.9 % (FLUSH) 0.9 %
10 SYRINGE (ML) INJECTION EVERY 12 HOURS SCHEDULED
Status: CANCELLED | OUTPATIENT
Start: 2020-01-01

## 2020-01-01 RX ORDER — DIPHENHYDRAMINE HYDROCHLORIDE 50 MG/ML
25 INJECTION INTRAMUSCULAR; INTRAVENOUS ONCE
Status: DISCONTINUED | OUTPATIENT
Start: 2020-01-01 | End: 2020-01-01

## 2020-01-01 RX ORDER — ATORVASTATIN CALCIUM 40 MG/1
80 TABLET, FILM COATED ORAL DAILY
Status: DISCONTINUED | OUTPATIENT
Start: 2020-01-01 | End: 2020-01-01 | Stop reason: HOSPADM

## 2020-01-01 RX ORDER — ACETAMINOPHEN 325 MG/1
650 TABLET ORAL EVERY 4 HOURS PRN
Status: DISCONTINUED | OUTPATIENT
Start: 2020-01-01 | End: 2020-01-01 | Stop reason: HOSPADM

## 2020-01-01 RX ORDER — LISINOPRIL 2.5 MG/1
2.5 TABLET ORAL DAILY
Status: DISCONTINUED | OUTPATIENT
Start: 2020-01-01 | End: 2020-01-01 | Stop reason: HOSPADM

## 2020-01-01 RX ORDER — GEMFIBROZIL 600 MG/1
600 TABLET, FILM COATED ORAL 2 TIMES DAILY
Status: DISCONTINUED | OUTPATIENT
Start: 2020-01-01 | End: 2020-01-01

## 2020-01-01 RX ORDER — LANOLIN ALCOHOL/MO/W.PET/CERES
3 CREAM (GRAM) TOPICAL NIGHTLY PRN
Status: COMPLETED | OUTPATIENT
Start: 2020-01-01 | End: 2020-01-01

## 2020-01-01 RX ORDER — NICOTINE POLACRILEX 4 MG
15 LOZENGE BUCCAL PRN
Status: DISCONTINUED | OUTPATIENT
Start: 2020-01-01 | End: 2020-01-01 | Stop reason: HOSPADM

## 2020-01-01 RX ORDER — ONDANSETRON 2 MG/ML
4 INJECTION INTRAMUSCULAR; INTRAVENOUS EVERY 6 HOURS PRN
Status: DISCONTINUED | OUTPATIENT
Start: 2020-01-01 | End: 2020-01-01

## 2020-01-01 RX ORDER — LISINOPRIL 2.5 MG/1
1 TABLET ORAL DAILY
Status: DISCONTINUED | OUTPATIENT
Start: 2020-01-01 | End: 2020-01-01

## 2020-01-01 RX ORDER — TRAMADOL HYDROCHLORIDE 50 MG/1
50 TABLET ORAL EVERY 6 HOURS PRN
Status: DISCONTINUED | OUTPATIENT
Start: 2020-01-01 | End: 2020-01-01 | Stop reason: HOSPADM

## 2020-01-01 RX ORDER — DRONABINOL 2.5 MG/1
2.5 CAPSULE ORAL 2 TIMES DAILY
Status: DISCONTINUED | OUTPATIENT
Start: 2020-01-01 | End: 2020-01-01

## 2020-01-01 RX ORDER — FOLIC ACID 1 MG/1
1 TABLET ORAL DAILY
Status: CANCELLED | OUTPATIENT
Start: 2020-01-01

## 2020-01-01 RX ORDER — 0.9 % SODIUM CHLORIDE 0.9 %
500 INTRAVENOUS SOLUTION INTRAVENOUS ONCE
Status: COMPLETED | OUTPATIENT
Start: 2020-01-01 | End: 2020-01-01

## 2020-01-01 RX ORDER — NICOTINE POLACRILEX 4 MG
15 LOZENGE BUCCAL PRN
Status: CANCELLED | OUTPATIENT
Start: 2020-01-01

## 2020-01-01 RX ORDER — ONDANSETRON 2 MG/ML
4 INJECTION INTRAMUSCULAR; INTRAVENOUS EVERY 6 HOURS PRN
Status: CANCELLED | OUTPATIENT
Start: 2020-01-01

## 2020-01-01 RX ORDER — ZOLPIDEM TARTRATE 5 MG/1
5 TABLET ORAL NIGHTLY PRN
Status: DISCONTINUED | OUTPATIENT
Start: 2020-01-01 | End: 2020-01-01 | Stop reason: HOSPADM

## 2020-01-01 RX ORDER — SODIUM CHLORIDE 0.9 % (FLUSH) 0.9 %
10 SYRINGE (ML) INJECTION PRN
Status: DISCONTINUED | OUTPATIENT
Start: 2020-01-01 | End: 2020-01-01

## 2020-01-01 RX ORDER — GEMFIBROZIL 600 MG/1
600 TABLET, FILM COATED ORAL 2 TIMES DAILY
Status: ON HOLD | COMMUNITY
End: 2020-01-01

## 2020-01-01 RX ORDER — PANTOPRAZOLE SODIUM 40 MG/1
40 TABLET, DELAYED RELEASE ORAL
Status: DISCONTINUED | OUTPATIENT
Start: 2020-01-01 | End: 2020-01-01

## 2020-01-01 RX ORDER — LANOLIN ALCOHOL/MO/W.PET/CERES
1000 CREAM (GRAM) TOPICAL DAILY
Status: DISCONTINUED | OUTPATIENT
Start: 2020-01-01 | End: 2020-01-01 | Stop reason: HOSPADM

## 2020-01-01 RX ADMIN — METOPROLOL TARTRATE 100 MG: 50 TABLET, FILM COATED ORAL at 20:56

## 2020-01-01 RX ADMIN — ENOXAPARIN SODIUM 30 MG: 30 INJECTION SUBCUTANEOUS at 09:38

## 2020-01-01 RX ADMIN — PREDNISONE 10 MG: 10 TABLET ORAL at 13:47

## 2020-01-01 RX ADMIN — PANTOPRAZOLE SODIUM 40 MG: 40 TABLET, DELAYED RELEASE ORAL at 06:02

## 2020-01-01 RX ADMIN — PREDNISONE 10 MG: 10 TABLET ORAL at 08:40

## 2020-01-01 RX ADMIN — METOPROLOL TARTRATE 100 MG: 50 TABLET, FILM COATED ORAL at 21:37

## 2020-01-01 RX ADMIN — MELATONIN TAB 3 MG 3 MG: 3 TAB at 22:35

## 2020-01-01 RX ADMIN — ZOLPIDEM TARTRATE 5 MG: 5 TABLET ORAL at 21:18

## 2020-01-01 RX ADMIN — GEMFIBROZIL 600 MG: 600 TABLET ORAL at 11:34

## 2020-01-01 RX ADMIN — ENOXAPARIN SODIUM 30 MG: 30 INJECTION SUBCUTANEOUS at 09:32

## 2020-01-01 RX ADMIN — METOPROLOL TARTRATE 100 MG: 50 TABLET, FILM COATED ORAL at 22:53

## 2020-01-01 RX ADMIN — LISINOPRIL 2.5 MG: 2.5 TABLET ORAL at 09:16

## 2020-01-01 RX ADMIN — FOLIC ACID 1 MG: 1 TABLET ORAL at 08:40

## 2020-01-01 RX ADMIN — PANTOPRAZOLE SODIUM 40 MG: 40 TABLET, DELAYED RELEASE ORAL at 05:56

## 2020-01-01 RX ADMIN — FOLIC ACID 1 MG: 1 TABLET ORAL at 09:13

## 2020-01-01 RX ADMIN — TRAMADOL HYDROCHLORIDE 50 MG: 50 TABLET, FILM COATED ORAL at 00:00

## 2020-01-01 RX ADMIN — SUCRALFATE 1 G: 1 TABLET ORAL at 21:18

## 2020-01-01 RX ADMIN — MELATONIN 3 MG: at 01:01

## 2020-01-01 RX ADMIN — SODIUM CHLORIDE: 9 INJECTION, SOLUTION INTRAVENOUS at 13:03

## 2020-01-01 RX ADMIN — CARBIDOPA AND LEVODOPA 1 TABLET: 10; 100 TABLET ORAL at 21:06

## 2020-01-01 RX ADMIN — SUCRALFATE 1 G: 1 TABLET ORAL at 12:01

## 2020-01-01 RX ADMIN — ENOXAPARIN SODIUM 30 MG: 30 INJECTION SUBCUTANEOUS at 09:22

## 2020-01-01 RX ADMIN — METOPROLOL TARTRATE 100 MG: 50 TABLET, FILM COATED ORAL at 09:01

## 2020-01-01 RX ADMIN — CYANOCOBALAMIN TAB 1000 MCG 1000 MCG: 1000 TAB at 09:57

## 2020-01-01 RX ADMIN — SUCRALFATE 1 G: 1 TABLET ORAL at 09:19

## 2020-01-01 RX ADMIN — PANTOPRAZOLE SODIUM 40 MG: 40 TABLET, DELAYED RELEASE ORAL at 05:50

## 2020-01-01 RX ADMIN — Medication 10 ML: at 21:15

## 2020-01-01 RX ADMIN — SALINE NASAL SPRAY 1 SPRAY: 1.5 SOLUTION NASAL at 04:07

## 2020-01-01 RX ADMIN — MIRTAZAPINE 45 MG: 15 TABLET, FILM COATED ORAL at 21:00

## 2020-01-01 RX ADMIN — SUCRALFATE 1 G: 1 TABLET ORAL at 21:22

## 2020-01-01 RX ADMIN — SODIUM CHLORIDE, PRESERVATIVE FREE 10 ML: 5 INJECTION INTRAVENOUS at 21:04

## 2020-01-01 RX ADMIN — TRAMADOL HYDROCHLORIDE 50 MG: 50 TABLET, FILM COATED ORAL at 09:13

## 2020-01-01 RX ADMIN — CARBIDOPA AND LEVODOPA 1 TABLET: 10; 100 TABLET ORAL at 22:52

## 2020-01-01 RX ADMIN — CYANOCOBALAMIN 1000 MCG: 1000 INJECTION, SOLUTION INTRAMUSCULAR at 09:17

## 2020-01-01 RX ADMIN — METOPROLOL TARTRATE 25 MG: 25 TABLET ORAL at 20:27

## 2020-01-01 RX ADMIN — MELATONIN 3 MG: at 21:31

## 2020-01-01 RX ADMIN — PANTOPRAZOLE SODIUM 40 MG: 40 TABLET, DELAYED RELEASE ORAL at 06:16

## 2020-01-01 RX ADMIN — PANTOPRAZOLE SODIUM 40 MG: 40 TABLET, DELAYED RELEASE ORAL at 06:55

## 2020-01-01 RX ADMIN — CARBIDOPA AND LEVODOPA 1 TABLET: 10; 100 TABLET ORAL at 13:01

## 2020-01-01 RX ADMIN — ENOXAPARIN SODIUM 30 MG: 30 INJECTION SUBCUTANEOUS at 09:48

## 2020-01-01 RX ADMIN — CYANOCOBALAMIN TAB 1000 MCG 1000 MCG: 1000 TAB at 09:32

## 2020-01-01 RX ADMIN — CYANOCOBALAMIN 1000 MCG: 1000 INJECTION, SOLUTION INTRAMUSCULAR at 18:15

## 2020-01-01 RX ADMIN — FOLIC ACID 1 MG: 1 TABLET ORAL at 09:57

## 2020-01-01 RX ADMIN — CARBIDOPA AND LEVODOPA 1 TABLET: 10; 100 TABLET ORAL at 14:27

## 2020-01-01 RX ADMIN — CARBIDOPA AND LEVODOPA 1 TABLET: 10; 100 TABLET ORAL at 16:11

## 2020-01-01 RX ADMIN — LISINOPRIL 2.5 MG: 2.5 TABLET ORAL at 09:32

## 2020-01-01 RX ADMIN — PANTOPRAZOLE SODIUM 40 MG: 40 TABLET, DELAYED RELEASE ORAL at 09:27

## 2020-01-01 RX ADMIN — PANTOPRAZOLE SODIUM 40 MG: 40 TABLET, DELAYED RELEASE ORAL at 06:36

## 2020-01-01 RX ADMIN — CARBIDOPA AND LEVODOPA 1 TABLET: 10; 100 TABLET ORAL at 14:50

## 2020-01-01 RX ADMIN — CARBIDOPA AND LEVODOPA 1 TABLET: 10; 100 TABLET ORAL at 09:32

## 2020-01-01 RX ADMIN — METOPROLOL TARTRATE 100 MG: 50 TABLET, FILM COATED ORAL at 08:28

## 2020-01-01 RX ADMIN — SUCRALFATE 1 G: 1 TABLET ORAL at 22:36

## 2020-01-01 RX ADMIN — MELATONIN TAB 3 MG 3 MG: 3 TAB at 00:00

## 2020-01-01 RX ADMIN — SUCRALFATE 1 G: 1 TABLET ORAL at 09:16

## 2020-01-01 RX ADMIN — DIPHENHYDRAMINE HYDROCHLORIDE 25 MG: 50 INJECTION, SOLUTION INTRAMUSCULAR; INTRAVENOUS at 10:44

## 2020-01-01 RX ADMIN — METOPROLOL TARTRATE 25 MG: 25 TABLET ORAL at 20:12

## 2020-01-01 RX ADMIN — CYANOCOBALAMIN TAB 1000 MCG 1000 MCG: 1000 TAB at 09:41

## 2020-01-01 RX ADMIN — SALINE NASAL SPRAY 1 SPRAY: 1.5 SOLUTION NASAL at 21:48

## 2020-01-01 RX ADMIN — LISINOPRIL 2.5 MG: 2.5 TABLET ORAL at 09:41

## 2020-01-01 RX ADMIN — FOLIC ACID 1 MG: 1 TABLET ORAL at 09:15

## 2020-01-01 RX ADMIN — ACETAMINOPHEN 650 MG: 325 TABLET ORAL at 21:05

## 2020-01-01 RX ADMIN — ACETAMINOPHEN 650 MG: 325 TABLET ORAL at 11:53

## 2020-01-01 RX ADMIN — SODIUM CHLORIDE 500 ML: 9 INJECTION, SOLUTION INTRAVENOUS at 20:28

## 2020-01-01 RX ADMIN — METOPROLOL TARTRATE 100 MG: 50 TABLET, FILM COATED ORAL at 08:47

## 2020-01-01 RX ADMIN — ACETAMINOPHEN 650 MG: 325 TABLET ORAL at 23:56

## 2020-01-01 RX ADMIN — CARBIDOPA AND LEVODOPA 1 TABLET: 10; 100 TABLET ORAL at 08:39

## 2020-01-01 RX ADMIN — METOPROLOL TARTRATE 100 MG: 50 TABLET, FILM COATED ORAL at 19:57

## 2020-01-01 RX ADMIN — ACETAMINOPHEN 650 MG: 325 TABLET ORAL at 21:15

## 2020-01-01 RX ADMIN — CARBIDOPA AND LEVODOPA 1 TABLET: 10; 100 TABLET ORAL at 20:31

## 2020-01-01 RX ADMIN — METOPROLOL TARTRATE 100 MG: 50 TABLET, FILM COATED ORAL at 09:27

## 2020-01-01 RX ADMIN — PANTOPRAZOLE SODIUM 40 MG: 40 TABLET, DELAYED RELEASE ORAL at 05:58

## 2020-01-01 RX ADMIN — ENOXAPARIN SODIUM 30 MG: 30 INJECTION SUBCUTANEOUS at 09:59

## 2020-01-01 RX ADMIN — GEMFIBROZIL 600 MG: 600 TABLET ORAL at 14:41

## 2020-01-01 RX ADMIN — ENOXAPARIN SODIUM 30 MG: 30 INJECTION SUBCUTANEOUS at 08:40

## 2020-01-01 RX ADMIN — CARBIDOPA AND LEVODOPA 1 TABLET: 10; 100 TABLET ORAL at 09:13

## 2020-01-01 RX ADMIN — ENOXAPARIN SODIUM 30 MG: 30 INJECTION SUBCUTANEOUS at 10:32

## 2020-01-01 RX ADMIN — ENOXAPARIN SODIUM 30 MG: 30 INJECTION SUBCUTANEOUS at 09:12

## 2020-01-01 RX ADMIN — ATORVASTATIN CALCIUM 80 MG: 40 TABLET, FILM COATED ORAL at 09:12

## 2020-01-01 RX ADMIN — PANTOPRAZOLE SODIUM 40 MG: 40 TABLET, DELAYED RELEASE ORAL at 09:39

## 2020-01-01 RX ADMIN — ATORVASTATIN CALCIUM 80 MG: 40 TABLET, FILM COATED ORAL at 09:41

## 2020-01-01 RX ADMIN — LISINOPRIL 2.5 MG: 2.5 TABLET ORAL at 18:11

## 2020-01-01 RX ADMIN — SUCRALFATE 1 G: 1 TABLET ORAL at 12:39

## 2020-01-01 RX ADMIN — CARBIDOPA AND LEVODOPA 1 TABLET: 10; 100 TABLET ORAL at 11:29

## 2020-01-01 RX ADMIN — ZOLPIDEM TARTRATE 5 MG: 5 TABLET ORAL at 21:22

## 2020-01-01 RX ADMIN — METOPROLOL TARTRATE 100 MG: 50 TABLET, FILM COATED ORAL at 09:37

## 2020-01-01 RX ADMIN — ACETAMINOPHEN 650 MG: 325 TABLET ORAL at 22:40

## 2020-01-01 RX ADMIN — MELATONIN 3 MG: at 21:15

## 2020-01-01 RX ADMIN — SUCRALFATE 1 G: 1 TABLET ORAL at 08:47

## 2020-01-01 RX ADMIN — METOPROLOL TARTRATE 100 MG: 50 TABLET, FILM COATED ORAL at 20:09

## 2020-01-01 RX ADMIN — PANTOPRAZOLE SODIUM 40 MG: 40 TABLET, DELAYED RELEASE ORAL at 06:05

## 2020-01-01 RX ADMIN — PANTOPRAZOLE SODIUM 40 MG: 40 TABLET, DELAYED RELEASE ORAL at 06:11

## 2020-01-01 RX ADMIN — SODIUM CHLORIDE 75 ML/HR: 9 INJECTION, SOLUTION INTRAVENOUS at 12:01

## 2020-01-01 RX ADMIN — ATORVASTATIN CALCIUM 80 MG: 40 TABLET, FILM COATED ORAL at 08:39

## 2020-01-01 RX ADMIN — ONDANSETRON 4 MG: 4 TABLET, ORALLY DISINTEGRATING ORAL at 11:00

## 2020-01-01 RX ADMIN — METOPROLOL TARTRATE 100 MG: 50 TABLET, FILM COATED ORAL at 20:52

## 2020-01-01 RX ADMIN — SODIUM CHLORIDE, PRESERVATIVE FREE 10 ML: 5 INJECTION INTRAVENOUS at 23:57

## 2020-01-01 RX ADMIN — METOPROLOL TARTRATE 25 MG: 25 TABLET ORAL at 08:39

## 2020-01-01 RX ADMIN — ACETAMINOPHEN 650 MG: 325 TABLET ORAL at 21:39

## 2020-01-01 RX ADMIN — SODIUM CHLORIDE: 9 INJECTION, SOLUTION INTRAVENOUS at 01:43

## 2020-01-01 RX ADMIN — METOPROLOL TARTRATE 100 MG: 50 TABLET, FILM COATED ORAL at 11:29

## 2020-01-01 RX ADMIN — PANTOPRAZOLE SODIUM 40 MG: 40 TABLET, DELAYED RELEASE ORAL at 09:49

## 2020-01-01 RX ADMIN — METOPROLOL TARTRATE 25 MG: 25 TABLET ORAL at 21:00

## 2020-01-01 RX ADMIN — CARBIDOPA AND LEVODOPA 1 TABLET: 10; 100 TABLET ORAL at 15:08

## 2020-01-01 RX ADMIN — METOPROLOL TARTRATE 100 MG: 50 TABLET, FILM COATED ORAL at 09:15

## 2020-01-01 RX ADMIN — ONDANSETRON 4 MG: 4 TABLET, ORALLY DISINTEGRATING ORAL at 20:28

## 2020-01-01 RX ADMIN — PANTOPRAZOLE SODIUM 40 MG: 40 TABLET, DELAYED RELEASE ORAL at 05:52

## 2020-01-01 RX ADMIN — CARBIDOPA AND LEVODOPA 1 TABLET: 10; 100 TABLET ORAL at 13:47

## 2020-01-01 RX ADMIN — METOPROLOL TARTRATE 100 MG: 50 TABLET, FILM COATED ORAL at 21:22

## 2020-01-01 RX ADMIN — PREDNISONE 20 MG: 20 TABLET ORAL at 09:41

## 2020-01-01 RX ADMIN — PANTOPRAZOLE SODIUM 40 MG: 40 TABLET, DELAYED RELEASE ORAL at 10:20

## 2020-01-01 RX ADMIN — ACETAMINOPHEN 650 MG: 325 TABLET ORAL at 01:48

## 2020-01-01 RX ADMIN — GEMFIBROZIL 600 MG: 600 TABLET ORAL at 15:20

## 2020-01-01 RX ADMIN — CARBIDOPA AND LEVODOPA 1 TABLET: 10; 100 TABLET ORAL at 14:41

## 2020-01-01 RX ADMIN — PANTOPRAZOLE SODIUM 40 MG: 40 TABLET, DELAYED RELEASE ORAL at 05:14

## 2020-01-01 RX ADMIN — CARBIDOPA AND LEVODOPA 1 TABLET: 10; 100 TABLET ORAL at 09:58

## 2020-01-01 RX ADMIN — CYANOCOBALAMIN TAB 1000 MCG 1000 MCG: 1000 TAB at 09:11

## 2020-01-01 RX ADMIN — ENOXAPARIN SODIUM 30 MG: 30 INJECTION SUBCUTANEOUS at 09:14

## 2020-01-01 RX ADMIN — ENOXAPARIN SODIUM 30 MG: 30 INJECTION SUBCUTANEOUS at 09:39

## 2020-01-01 RX ADMIN — METOPROLOL TARTRATE 100 MG: 50 TABLET, FILM COATED ORAL at 21:04

## 2020-01-01 RX ADMIN — ENOXAPARIN SODIUM 30 MG: 30 INJECTION SUBCUTANEOUS at 09:27

## 2020-01-01 RX ADMIN — ENOXAPARIN SODIUM 30 MG: 30 INJECTION SUBCUTANEOUS at 09:06

## 2020-01-01 RX ADMIN — SODIUM CHLORIDE, PRESERVATIVE FREE 10 ML: 5 INJECTION INTRAVENOUS at 20:53

## 2020-01-01 RX ADMIN — CARBIDOPA AND LEVODOPA 1 TABLET: 10; 100 TABLET ORAL at 09:41

## 2020-01-01 RX ADMIN — METOPROLOL TARTRATE 100 MG: 50 TABLET, FILM COATED ORAL at 21:31

## 2020-01-01 RX ADMIN — ACETAMINOPHEN 650 MG: 325 TABLET ORAL at 10:59

## 2020-01-01 RX ADMIN — LISINOPRIL 2.5 MG: 2.5 TABLET ORAL at 09:19

## 2020-01-01 RX ADMIN — MIRTAZAPINE 30 MG: 30 TABLET, ORALLY DISINTEGRATING ORAL at 20:49

## 2020-01-01 RX ADMIN — METOPROLOL TARTRATE 100 MG: 50 TABLET, FILM COATED ORAL at 09:40

## 2020-01-01 RX ADMIN — ATORVASTATIN CALCIUM 80 MG: 40 TABLET, FILM COATED ORAL at 11:29

## 2020-01-01 RX ADMIN — CYANOCOBALAMIN TAB 1000 MCG 1000 MCG: 1000 TAB at 09:15

## 2020-01-01 RX ADMIN — LISINOPRIL 2.5 MG: 2.5 TABLET ORAL at 08:47

## 2020-01-01 RX ADMIN — METOPROLOL TARTRATE 100 MG: 50 TABLET, FILM COATED ORAL at 23:14

## 2020-01-01 RX ADMIN — FOLIC ACID 1 MG: 1 TABLET ORAL at 09:41

## 2020-01-01 RX ADMIN — PREDNISONE 10 MG: 10 TABLET ORAL at 13:01

## 2020-01-01 RX ADMIN — METOPROLOL TARTRATE 100 MG: 50 TABLET, FILM COATED ORAL at 21:02

## 2020-01-01 RX ADMIN — GEMFIBROZIL 600 MG: 600 TABLET ORAL at 09:57

## 2020-01-01 RX ADMIN — ONDANSETRON 4 MG: 4 TABLET, ORALLY DISINTEGRATING ORAL at 16:47

## 2020-01-01 RX ADMIN — ENOXAPARIN SODIUM 30 MG: 30 INJECTION SUBCUTANEOUS at 09:31

## 2020-01-01 RX ADMIN — MELATONIN TAB 3 MG 3 MG: 3 TAB at 22:40

## 2020-01-01 RX ADMIN — CARBIDOPA AND LEVODOPA 1 TABLET: 10; 100 TABLET ORAL at 20:57

## 2020-01-01 RX ADMIN — METOPROLOL TARTRATE 100 MG: 50 TABLET, FILM COATED ORAL at 22:35

## 2020-01-01 RX ADMIN — ONDANSETRON 4 MG: 2 INJECTION INTRAMUSCULAR; INTRAVENOUS at 00:07

## 2020-01-01 RX ADMIN — CARBIDOPA AND LEVODOPA 1 TABLET: 10; 100 TABLET ORAL at 13:56

## 2020-01-01 RX ADMIN — LISINOPRIL 2.5 MG: 2.5 TABLET ORAL at 09:06

## 2020-01-01 RX ADMIN — METOPROLOL TARTRATE 100 MG: 50 TABLET, FILM COATED ORAL at 21:18

## 2020-01-01 RX ADMIN — MIRTAZAPINE 15 MG: 30 TABLET, ORALLY DISINTEGRATING ORAL at 21:18

## 2020-01-01 RX ADMIN — ONDANSETRON 4 MG: 4 TABLET, ORALLY DISINTEGRATING ORAL at 20:37

## 2020-01-01 RX ADMIN — SODIUM CHLORIDE: 9 INJECTION, SOLUTION INTRAVENOUS at 23:56

## 2020-01-01 RX ADMIN — ACETAMINOPHEN 650 MG: 325 TABLET ORAL at 21:31

## 2020-01-01 RX ADMIN — ZOLPIDEM TARTRATE 5 MG: 5 TABLET ORAL at 00:28

## 2020-01-01 RX ADMIN — CARBIDOPA AND LEVODOPA 1 TABLET: 10; 100 TABLET ORAL at 21:35

## 2020-01-01 RX ADMIN — PANTOPRAZOLE SODIUM 40 MG: 40 TABLET, DELAYED RELEASE ORAL at 05:32

## 2020-01-01 RX ADMIN — LISINOPRIL 2.5 MG: 2.5 TABLET ORAL at 11:25

## 2020-01-01 RX ADMIN — SODIUM CHLORIDE, PRESERVATIVE FREE 10 ML: 5 INJECTION INTRAVENOUS at 23:15

## 2020-01-01 RX ADMIN — SODIUM CHLORIDE 75 ML/HR: 9 INJECTION, SOLUTION INTRAVENOUS at 13:27

## 2020-01-01 RX ADMIN — SODIUM CHLORIDE, PRESERVATIVE FREE 10 ML: 5 INJECTION INTRAVENOUS at 21:20

## 2020-01-01 RX ADMIN — METOPROLOL TARTRATE 100 MG: 50 TABLET, FILM COATED ORAL at 20:06

## 2020-01-01 RX ADMIN — MELATONIN 3 MG: at 21:39

## 2020-01-01 RX ADMIN — FOLIC ACID 1 MG: 1 TABLET ORAL at 09:39

## 2020-01-01 RX ADMIN — MELATONIN TAB 3 MG 3 MG: 3 TAB at 20:31

## 2020-01-01 RX ADMIN — METOPROLOL TARTRATE 100 MG: 50 TABLET, FILM COATED ORAL at 09:17

## 2020-01-01 RX ADMIN — GEMFIBROZIL 600 MG: 600 TABLET ORAL at 09:13

## 2020-01-01 RX ADMIN — MELATONIN TAB 3 MG 3 MG: 3 TAB at 23:36

## 2020-01-01 RX ADMIN — METOPROLOL TARTRATE 100 MG: 50 TABLET, FILM COATED ORAL at 09:48

## 2020-01-01 RX ADMIN — CARBIDOPA AND LEVODOPA 1 TABLET: 10; 100 TABLET ORAL at 21:38

## 2020-01-01 RX ADMIN — SUCRALFATE 1 G: 1 TABLET ORAL at 16:29

## 2020-01-01 RX ADMIN — LISINOPRIL 2.5 MG: 2.5 TABLET ORAL at 09:15

## 2020-01-01 RX ADMIN — METOPROLOL TARTRATE 100 MG: 50 TABLET, FILM COATED ORAL at 09:12

## 2020-01-01 RX ADMIN — METOPROLOL TARTRATE 100 MG: 50 TABLET, FILM COATED ORAL at 09:31

## 2020-01-01 RX ADMIN — ATORVASTATIN CALCIUM 80 MG: 40 TABLET, FILM COATED ORAL at 09:32

## 2020-01-01 RX ADMIN — METOPROLOL TARTRATE 25 MG: 25 TABLET ORAL at 09:32

## 2020-01-01 RX ADMIN — METOPROLOL TARTRATE 100 MG: 50 TABLET, FILM COATED ORAL at 10:20

## 2020-01-01 RX ADMIN — CARBIDOPA AND LEVODOPA 1 TABLET: 10; 100 TABLET ORAL at 13:24

## 2020-01-01 RX ADMIN — MELATONIN TAB 3 MG 3 MG: 3 TAB at 21:31

## 2020-01-01 RX ADMIN — CYANOCOBALAMIN TAB 1000 MCG 1000 MCG: 1000 TAB at 09:13

## 2020-01-01 RX ADMIN — SUCRALFATE 1 G: 1 TABLET ORAL at 23:56

## 2020-01-01 RX ADMIN — METOPROLOL TARTRATE 100 MG: 50 TABLET, FILM COATED ORAL at 21:39

## 2020-01-01 RX ADMIN — FOLIC ACID 1 MG: 1 TABLET ORAL at 11:29

## 2020-01-01 RX ADMIN — ENOXAPARIN SODIUM 30 MG: 30 INJECTION SUBCUTANEOUS at 09:17

## 2020-01-01 RX ADMIN — LISINOPRIL 2.5 MG: 2.5 TABLET ORAL at 09:17

## 2020-01-01 RX ADMIN — ENOXAPARIN SODIUM 30 MG: 30 INJECTION SUBCUTANEOUS at 09:13

## 2020-01-01 RX ADMIN — LISINOPRIL 2.5 MG: 2.5 TABLET ORAL at 08:39

## 2020-01-01 RX ADMIN — FOLIC ACID 1 MG: 1 TABLET ORAL at 14:41

## 2020-01-01 RX ADMIN — SUCRALFATE 1 G: 1 TABLET ORAL at 14:53

## 2020-01-01 RX ADMIN — PANTOPRAZOLE SODIUM 40 MG: 40 TABLET, DELAYED RELEASE ORAL at 06:28

## 2020-01-01 RX ADMIN — ENOXAPARIN SODIUM 30 MG: 30 INJECTION SUBCUTANEOUS at 09:41

## 2020-01-01 RX ADMIN — PREDNISONE 10 MG: 10 TABLET ORAL at 08:28

## 2020-01-01 RX ADMIN — SUCRALFATE 1 G: 1 TABLET ORAL at 11:26

## 2020-01-01 RX ADMIN — ENOXAPARIN SODIUM 30 MG: 30 INJECTION SUBCUTANEOUS at 09:40

## 2020-01-01 RX ADMIN — METOPROLOL TARTRATE 100 MG: 50 TABLET, FILM COATED ORAL at 09:19

## 2020-01-01 RX ADMIN — SALINE NASAL SPRAY 1 SPRAY: 1.5 SOLUTION NASAL at 09:49

## 2020-01-01 RX ADMIN — PANTOPRAZOLE SODIUM 40 MG: 40 TABLET, DELAYED RELEASE ORAL at 10:32

## 2020-01-01 RX ADMIN — PREDNISONE 20 MG: 20 TABLET ORAL at 09:32

## 2020-01-01 RX ADMIN — PANTOPRAZOLE SODIUM 40 MG: 40 TABLET, DELAYED RELEASE ORAL at 06:06

## 2020-01-01 RX ADMIN — LISINOPRIL 2.5 MG: 2.5 TABLET ORAL at 09:11

## 2020-01-01 RX ADMIN — CARBIDOPA AND LEVODOPA 1 TABLET: 10; 100 TABLET ORAL at 15:19

## 2020-01-01 RX ADMIN — ENOXAPARIN SODIUM 30 MG: 30 INJECTION SUBCUTANEOUS at 08:46

## 2020-01-01 RX ADMIN — METOPROLOL TARTRATE 100 MG: 50 TABLET, FILM COATED ORAL at 22:37

## 2020-01-01 RX ADMIN — POTASSIUM CHLORIDE 40 MEQ: 20 TABLET, EXTENDED RELEASE ORAL at 12:55

## 2020-01-01 RX ADMIN — MELATONIN TAB 3 MG 3 MG: 3 TAB at 21:37

## 2020-01-01 RX ADMIN — SUCRALFATE 1 G: 1 TABLET ORAL at 18:16

## 2020-01-01 RX ADMIN — CARBIDOPA AND LEVODOPA 1 TABLET: 10; 100 TABLET ORAL at 20:12

## 2020-01-01 RX ADMIN — METOPROLOL TARTRATE 100 MG: 50 TABLET, FILM COATED ORAL at 09:16

## 2020-01-01 RX ADMIN — CARBIDOPA AND LEVODOPA 1 TABLET: 10; 100 TABLET ORAL at 20:27

## 2020-01-01 RX ADMIN — ONDANSETRON 4 MG: 4 TABLET, ORALLY DISINTEGRATING ORAL at 22:24

## 2020-01-01 RX ADMIN — METFORMIN HYDROCHLORIDE 500 MG: 500 TABLET ORAL at 14:41

## 2020-01-01 RX ADMIN — FOLIC ACID 1 MG: 1 TABLET ORAL at 09:12

## 2020-01-01 RX ADMIN — FOLIC ACID 1 MG: 1 TABLET ORAL at 09:32

## 2020-01-01 RX ADMIN — METOPROLOL TARTRATE 100 MG: 50 TABLET, FILM COATED ORAL at 21:06

## 2020-01-01 RX ADMIN — ENOXAPARIN SODIUM 30 MG: 30 INJECTION SUBCUTANEOUS at 11:28

## 2020-01-01 RX ADMIN — METOPROLOL TARTRATE 100 MG: 50 TABLET, FILM COATED ORAL at 09:38

## 2020-01-01 RX ADMIN — SUCRALFATE 1 G: 1 TABLET ORAL at 17:15

## 2020-01-01 RX ADMIN — ENOXAPARIN SODIUM 30 MG: 30 INJECTION SUBCUTANEOUS at 09:19

## 2020-01-01 RX ADMIN — MELATONIN TAB 3 MG 3 MG: 3 TAB at 23:25

## 2020-01-01 RX ADMIN — ATORVASTATIN CALCIUM 80 MG: 40 TABLET, FILM COATED ORAL at 09:58

## 2020-01-01 RX ADMIN — CARBIDOPA AND LEVODOPA 1 TABLET: 10; 100 TABLET ORAL at 21:00

## 2020-01-01 RX ADMIN — ENOXAPARIN SODIUM 30 MG: 30 INJECTION SUBCUTANEOUS at 10:20

## 2020-01-01 RX ADMIN — SODIUM CHLORIDE, PRESERVATIVE FREE 10 ML: 5 INJECTION INTRAVENOUS at 09:01

## 2020-01-01 RX ADMIN — METOPROLOL TARTRATE 100 MG: 50 TABLET, FILM COATED ORAL at 09:21

## 2020-01-01 RX ADMIN — ENOXAPARIN SODIUM 30 MG: 30 INJECTION SUBCUTANEOUS at 08:28

## 2020-01-01 RX ADMIN — GEMFIBROZIL 600 MG: 600 TABLET ORAL at 16:12

## 2020-01-01 RX ADMIN — CARBIDOPA AND LEVODOPA 1 TABLET: 10; 100 TABLET ORAL at 15:15

## 2020-01-01 RX ADMIN — METOPROLOL TARTRATE 100 MG: 50 TABLET, FILM COATED ORAL at 09:06

## 2020-01-01 RX ADMIN — MIRTAZAPINE 30 MG: 30 TABLET, ORALLY DISINTEGRATING ORAL at 20:27

## 2020-01-01 RX ADMIN — LISINOPRIL 2.5 MG: 2.5 TABLET ORAL at 09:13

## 2020-01-01 RX ADMIN — GEMFIBROZIL 600 MG: 600 TABLET ORAL at 16:30

## 2020-01-01 RX ADMIN — METOPROLOL TARTRATE 100 MG: 50 TABLET, FILM COATED ORAL at 10:32

## 2020-01-01 RX ADMIN — CARBIDOPA AND LEVODOPA 1 TABLET: 10; 100 TABLET ORAL at 16:23

## 2020-01-01 RX ADMIN — MIRTAZAPINE 15 MG: 30 TABLET, ORALLY DISINTEGRATING ORAL at 21:38

## 2020-01-01 RX ADMIN — GADOBENATE DIMEGLUMINE 10 ML: 529 INJECTION, SOLUTION INTRAVENOUS at 15:22

## 2020-01-01 RX ADMIN — ONDANSETRON 4 MG: 4 TABLET, ORALLY DISINTEGRATING ORAL at 13:01

## 2020-01-01 RX ADMIN — ATORVASTATIN CALCIUM 80 MG: 40 TABLET, FILM COATED ORAL at 14:41

## 2020-01-01 RX ADMIN — TRAMADOL HYDROCHLORIDE 50 MG: 50 TABLET, FILM COATED ORAL at 23:40

## 2020-01-01 RX ADMIN — METOPROLOL TARTRATE 100 MG: 50 TABLET, FILM COATED ORAL at 09:32

## 2020-01-01 RX ADMIN — SUCRALFATE 1 G: 1 TABLET ORAL at 12:07

## 2020-01-01 RX ADMIN — CYANOCOBALAMIN TAB 1000 MCG 1000 MCG: 1000 TAB at 09:38

## 2020-01-01 RX ADMIN — METOPROLOL TARTRATE 100 MG: 50 TABLET, FILM COATED ORAL at 21:15

## 2020-01-01 RX ADMIN — TRAMADOL HYDROCHLORIDE 50 MG: 50 TABLET, FILM COATED ORAL at 15:58

## 2020-01-01 RX ADMIN — PANTOPRAZOLE SODIUM 40 MG: 40 TABLET, DELAYED RELEASE ORAL at 08:28

## 2020-01-01 RX ADMIN — ACETAMINOPHEN 650 MG: 325 TABLET ORAL at 21:06

## 2020-01-01 RX ADMIN — ENOXAPARIN SODIUM 30 MG: 30 INJECTION SUBCUTANEOUS at 11:26

## 2020-01-01 RX ADMIN — MIRTAZAPINE 30 MG: 30 TABLET, ORALLY DISINTEGRATING ORAL at 20:12

## 2020-01-01 RX ADMIN — PANTOPRAZOLE SODIUM 40 MG: 40 TABLET, DELAYED RELEASE ORAL at 06:22

## 2020-01-01 RX ADMIN — CYANOCOBALAMIN TAB 1000 MCG 1000 MCG: 1000 TAB at 11:28

## 2020-01-01 RX ADMIN — MELATONIN 3 MG: at 00:57

## 2020-01-01 RX ADMIN — CYANOCOBALAMIN TAB 1000 MCG 1000 MCG: 1000 TAB at 08:39

## 2020-01-01 RX ADMIN — ACETAMINOPHEN 650 MG: 325 TABLET ORAL at 06:04

## 2020-01-01 RX ADMIN — CARBIDOPA AND LEVODOPA 1 TABLET: 10; 100 TABLET ORAL at 08:28

## 2020-01-01 RX ADMIN — METOPROLOL TARTRATE 25 MG: 25 TABLET ORAL at 09:13

## 2020-01-01 RX ADMIN — SODIUM CHLORIDE: 9 INJECTION, SOLUTION INTRAVENOUS at 23:15

## 2020-01-01 RX ADMIN — LISINOPRIL 2.5 MG: 2.5 TABLET ORAL at 09:39

## 2020-01-01 RX ADMIN — ENOXAPARIN SODIUM 30 MG: 30 INJECTION SUBCUTANEOUS at 09:02

## 2020-01-01 RX ADMIN — METOPROLOL TARTRATE 100 MG: 50 TABLET, FILM COATED ORAL at 09:39

## 2020-01-01 RX ADMIN — METOPROLOL TARTRATE 100 MG: 50 TABLET, FILM COATED ORAL at 23:57

## 2020-01-01 RX ADMIN — MIRTAZAPINE 30 MG: 30 TABLET, ORALLY DISINTEGRATING ORAL at 20:57

## 2020-01-01 RX ADMIN — METOPROLOL TARTRATE 25 MG: 25 TABLET ORAL at 09:41

## 2020-01-01 RX ADMIN — DRONABINOL 2.5 MG: 2.5 CAPSULE ORAL at 15:57

## 2020-01-01 RX ADMIN — METOPROLOL TARTRATE 100 MG: 50 TABLET, FILM COATED ORAL at 21:35

## 2020-01-01 RX ADMIN — CARBIDOPA AND LEVODOPA 1 TABLET: 10; 100 TABLET ORAL at 20:49

## 2020-01-01 RX ADMIN — ACETAMINOPHEN 650 MG: 325 TABLET ORAL at 21:19

## 2020-01-01 RX ADMIN — FOLIC ACID 1 MG: 1 TABLET ORAL at 09:18

## 2020-01-01 RX ADMIN — METOPROLOL TARTRATE 25 MG: 25 TABLET ORAL at 20:49

## 2020-01-01 RX ADMIN — DIPHENHYDRAMINE HYDROCHLORIDE 25 MG: 50 INJECTION, SOLUTION INTRAMUSCULAR; INTRAVENOUS at 13:47

## 2020-01-01 ASSESSMENT — PAIN SCALES - GENERAL
PAINLEVEL_OUTOF10: 0
PAINLEVEL_OUTOF10: 3
PAINLEVEL_OUTOF10: 0
PAINLEVEL_OUTOF10: 3
PAINLEVEL_OUTOF10: 0
PAINLEVEL_OUTOF10: 3
PAINLEVEL_OUTOF10: 0
PAINLEVEL_OUTOF10: 7
PAINLEVEL_OUTOF10: 0
PAINLEVEL_OUTOF10: 2
PAINLEVEL_OUTOF10: 0
PAINLEVEL_OUTOF10: 7
PAINLEVEL_OUTOF10: 0
PAINLEVEL_OUTOF10: 2
PAINLEVEL_OUTOF10: 0
PAINLEVEL_OUTOF10: 6
PAINLEVEL_OUTOF10: 3
PAINLEVEL_OUTOF10: 0
PAINLEVEL_OUTOF10: 7
PAINLEVEL_OUTOF10: 0
PAINLEVEL_OUTOF10: 3
PAINLEVEL_OUTOF10: 0
PAINLEVEL_OUTOF10: 8
PAINLEVEL_OUTOF10: 0
PAINLEVEL_OUTOF10: 7
PAINLEVEL_OUTOF10: 0
PAINLEVEL_OUTOF10: 7
PAINLEVEL_OUTOF10: 0
PAINLEVEL_OUTOF10: 0
PAINLEVEL_OUTOF10: 1
PAINLEVEL_OUTOF10: 8
PAINLEVEL_OUTOF10: 1
PAINLEVEL_OUTOF10: 0
PAINLEVEL_OUTOF10: 7
PAINLEVEL_OUTOF10: 0
PAINLEVEL_OUTOF10: 2
PAINLEVEL_OUTOF10: 0
PAINLEVEL_OUTOF10: 2
PAINLEVEL_OUTOF10: 1
PAINLEVEL_OUTOF10: 8
PAINLEVEL_OUTOF10: 0

## 2020-01-01 ASSESSMENT — PAIN SCALES - WONG BAKER
WONGBAKER_NUMERICALRESPONSE: 0
WONGBAKER_NUMERICALRESPONSE: 2
WONGBAKER_NUMERICALRESPONSE: 0
WONGBAKER_NUMERICALRESPONSE: 2
WONGBAKER_NUMERICALRESPONSE: 0

## 2020-01-01 ASSESSMENT — PAIN DESCRIPTION - PROGRESSION
CLINICAL_PROGRESSION: NOT CHANGED
CLINICAL_PROGRESSION: GRADUALLY WORSENING
CLINICAL_PROGRESSION: NOT CHANGED
CLINICAL_PROGRESSION: RAPIDLY WORSENING

## 2020-01-01 ASSESSMENT — PAIN DESCRIPTION - ORIENTATION
ORIENTATION: RIGHT;LEFT
ORIENTATION: LEFT
ORIENTATION: LEFT
ORIENTATION: RIGHT;LEFT
ORIENTATION: MID;LOWER
ORIENTATION: RIGHT;LEFT
ORIENTATION: LEFT
ORIENTATION: RIGHT;LEFT

## 2020-01-01 ASSESSMENT — PAIN DESCRIPTION - PAIN TYPE
TYPE: ACUTE PAIN
TYPE: CHRONIC PAIN
TYPE: ACUTE PAIN
TYPE: CHRONIC PAIN
TYPE: ACUTE PAIN
TYPE: CHRONIC PAIN
TYPE: ACUTE PAIN
TYPE: ACUTE PAIN
TYPE: CHRONIC PAIN
TYPE: ACUTE PAIN
TYPE: ACUTE PAIN

## 2020-01-01 ASSESSMENT — ENCOUNTER SYMPTOMS
ABDOMINAL PAIN: 0
EYE REDNESS: 0
SORE THROAT: 0
SHORTNESS OF BREATH: 0
NAUSEA: 0
VOMITING: 0
DIARRHEA: 1
BACK PAIN: 0
BLOOD IN STOOL: 0
COUGH: 0
RHINORRHEA: 0
CONSTIPATION: 0

## 2020-01-01 ASSESSMENT — PAIN DESCRIPTION - FREQUENCY
FREQUENCY: CONTINUOUS
FREQUENCY: CONTINUOUS
FREQUENCY: INTERMITTENT
FREQUENCY: CONTINUOUS
FREQUENCY: INTERMITTENT
FREQUENCY: INTERMITTENT
FREQUENCY: CONTINUOUS
FREQUENCY: INTERMITTENT

## 2020-01-01 ASSESSMENT — PAIN - FUNCTIONAL ASSESSMENT
PAIN_FUNCTIONAL_ASSESSMENT: PREVENTS OR INTERFERES SOME ACTIVE ACTIVITIES AND ADLS
PAIN_FUNCTIONAL_ASSESSMENT: ACTIVITIES ARE NOT PREVENTED
PAIN_FUNCTIONAL_ASSESSMENT: ACTIVITIES ARE NOT PREVENTED
PAIN_FUNCTIONAL_ASSESSMENT: PREVENTS OR INTERFERES SOME ACTIVE ACTIVITIES AND ADLS

## 2020-01-01 ASSESSMENT — PAIN DESCRIPTION - LOCATION
LOCATION: FOOT
LOCATION: LEG;ABDOMEN
LOCATION: LEG
LOCATION: FOOT
LOCATION: FOOT
LOCATION: LEG
LOCATION: LEG
LOCATION: BACK
LOCATION: BACK
LOCATION: ABDOMEN;LEG
LOCATION: BACK
LOCATION: BACK

## 2020-01-01 ASSESSMENT — PAIN DESCRIPTION - DESCRIPTORS
DESCRIPTORS: STABBING;SHARP
DESCRIPTORS: ACHING
DESCRIPTORS: SHARP;STABBING;JABBING
DESCRIPTORS: ACHING
DESCRIPTORS: ACHING

## 2020-01-01 ASSESSMENT — PAIN DESCRIPTION - ONSET
ONSET: ON-GOING

## 2020-01-13 PROBLEM — R29.6 RECURRENT FALLS: Status: ACTIVE | Noted: 2020-01-01

## 2020-01-13 NOTE — ED PROVIDER NOTES
As provider-in-triage, I performed a medical screening history and physical exam on this patient. HISTORY OF PRESENT ILLNESS  Chan Mendez is a 67 y.o. female presents with generalized fatigue, weakness. She has had decreased appetite over the past week and reports associated nausea and dry heaves. She denies significant abdominal pain. She has had increased frequency of falls due to feeling generally weak. Lives at home with her daughter. She did have a fall today in the bathroom, denies hitting her head, loss of consciousness, associated neck or back pain. Denies any injuries from this fall. PHYSICAL EXAM  BP (!) 143/64   Pulse 86   Temp 97.8 °F (36.6 °C)   Resp 16   Ht 5' 4\" (1.626 m)   Wt 227 lb (103 kg)   SpO2 98%   BMI 38.96 kg/m²     On exam, patient frequently dry heaving. Mucous membranes are moist. Speech is clear. Breathing is unlabored. Skin is dry. Mental status is normal. Moves all extremities, and is without facial droop. Diagnostics and treatment initiated. Patient moved to main ED. Comment: Please note this report has been produced using speech recognition software and may contain errors related to that system including errors in grammar, punctuation, and spelling, as well as words and phrases that may be inappropriate. If there are any questions or concerns please feel free to contact the dictating provider for clarification.         Chandrakant May  01/13/20 [id number]

## 2020-01-14 PROBLEM — E44.0 MODERATE MALNUTRITION (HCC): Chronic | Status: ACTIVE | Noted: 2020-01-01

## 2020-01-14 NOTE — CONSULTS
or leg weakness or sensation changes. She does not feel she has chest pain or shortness of breath.       Past Medical History:   Diagnosis Date    Acid reflux     Arthritis     \"Back\"    Back pain     \"Back Hurts Sometimes\"    CAD (coronary artery disease)     Sees Dr. Aurora Mahmood Diabetes mellitus [facility]) Dx 2000's    \"Borderline\"    Hiatal hernia     Hx of blood clots 2001    \"Blood Clots Legs After Heart Surgery\"    Hx of migraines     HX OTHER MEDICAL     Primary Care Physician Is Dr. Charlie Juarez Hyperlipidemia     Hypertension     IBS (irritable bowel syndrome)     Right Breast Cancer 2005 Or 2006     Right Breast Mastectomy    Shortness of breath on exertion     UTI (urinary tract infection) In Past    No Current Symptoms    Wears dentures     Full Upper    Wears glasses     To Read    :   Past Surgical History:   Procedure Laterality Date    APPENDECTOMY  Unsure When    BREAST SURGERY Right 2005 Or 2006    Right Mastectomy For Breast Cancer    CARDIAC SURGERY  2001    CABG (Two Bypasses)    CHOLECYSTECTOMY, LAPAROSCOPIC  1990's Or 2000's    COLONOSCOPY  \"Two\" Last Done 2000's    Polyps Removed In Past    DENTAL SURGERY      All Upper Teeth Extracted In Past    DILATATION, ESOPHAGUS  \"3 Or 4\" Last Done 7-14    EYE SURGERY Bilateral 2000's    Cataracts With Lens Implants    GASTRIC FUNDOPLICATION  6/8/44    Robotic asssited laprascopic nissen fundoplication    HERNIA REPAIR  [address]    Umbilical Hernia Repair    HIATAL HERNIA REPAIR  2014    INGUINAL HERNIA REPAIR Right 1980's Or 1990's    TONSILLECTOMY  1970's    TUBAL LIGATION  1965    UPPER GASTROINTESTINAL ENDOSCOPY N/A 12/27/2019    EGD BIOPSY performed by Stephanie Kumar MD at [address]      Three Removed In Past     Medications:  Scheduled Meds:   lisinopril  2.5 mg Oral Daily    metoprolol  100 mg Oral BID    pantoprazole  40 mg Oral QAM AC    sucralfate  1 g Oral 4x Daily    sodium chloride flush  10 mL Intravenous 2 times per day    enoxaparin  30 mg Subcutaneous Daily    insulin lispro  0-6 Units Subcutaneous TID WC    insulin lispro  0-3 Units Subcutaneous Nightly     Continuous Infusions:   sodium chloride 75 mL/hr at 20 1303    dextrose       PRN Meds:.ondansetron, sodium chloride flush, magnesium hydroxide, ondansetron, glucose, dextrose, glucagon (rDNA), dextrose, acetaminophen    No Known Allergies  Social History     Socioeconomic History    Marital status: Single     Spouse name: Not on file    Number of children: Not on file    Years of education: Not on file    Highest education level: Not on file   Occupational History    Not on file   Social Needs    Financial resource strain: Not on file    Food insecurity:     Worry: Not on file     Inability: Not on file    Transportation needs:     Medical: Not on file     Non-medical: Not on file   Tobacco Use    Smoking status: Former Smoker     Years: 7.00     Start date: 1971     Last attempt to quit: 1978     Years since quittin.4    Smokeless tobacco: Never Used   Substance and Sexual Activity    Alcohol use: No    Drug use: No     Comment: \"Smoked About 2 Or 3 Cigarettes A Day When Quit, I Didn't Ever Inhale\"    Sexual activity: Never   Lifestyle    Physical activity:     Days per week: Not on file     Minutes per session: Not on file    Stress: Not on file   Relationships    Social connections:     Talks on phone: Not on file     Gets together: Not on file     Attends Sikh service: Not on file     Active member of club or organization: Not on file     Attends meetings of clubs or organizations: Not on file     Relationship status: Not on file    Intimate partner violence:     Fear of current or ex partner: Not on file     Emotionally abused: Not on file     Physically abused: Not on file     Forced sexual activity: Not on file   Other Topics Concern    Not on file   Social History Mejia Edwards (MRN [id number]) as of 1/14/2020 15:12   Ref. Range 11/15/2019 12:20   Vitamin B-12 Latest Ref Range: 211 - 911 pg/ml 267.7     Results for Mejia Edwards (MRN [id number]) as of 1/14/2020 15:12   Ref. Range 12/25/2019 00:19   Hemoglobin A1C Latest Ref Range: 4.2 - 6.3 % 5.8       IMAGING:    CT Head:  No acute intracranial hemorrhage.       Ventricular dilatation appears slightly out of proportion to the degree of   sulcal atrophy and communicating hydrocephalus could be considered. MRI brain pending       ASSESSMENT/PLAN:     3 67year old female with acute fall, secondary to acute balance disturbance superimposed on BLE PN with acute dehydration. No suspicion for NPH based on exam and description of events, CT reviewed there is clear advanced atrophy of the brain from previous studies. Plan of care as follows:  1. Neuro Exam:  1. BLE stocking distribution sensation loss, otherwise non focal  2. Neurodiagnostics:  1. CT head as above   2. MRI pending --->low yield   3. B12 from 2019 as above  3. Medications:  1. B12 supplementation   4. PT/OT/ST:  1. ARU?  5. Follow up:  1. EMG in our office upon discharge  4-6 weeks   2. No further neurological recommendations     Thank you for allowing us to participate in the care of your patient. If there are any questions regarding evaluation please feel free to contact us. PAMELLA Reed CNP, 1/14/2020      ------------------------------------    Attending Note:  I have rounded on this patient with Vika Moffett CNP. I have reviewed the chart and we have discussed this case in detail. The patient was seen and examined by myself. Pertinent labs and imaging have been personally reviewed. Our findings and impressions were discussed with the patient. I concur with the Nurse Practioner's assessment and plan. MRI of the brain is not consistent with normal pressure hydrocephalus but rather hydrocephalus ex vacuo in the setting of cerebral atrophy.

## 2020-01-14 NOTE — H&P
[address], [address] [place] [address]                              HISTORY AND PHYSICAL    PATIENT NAME: Blank Olmos                       :        1940  MED REC NO:   [id number]                          ROOM:       [id number]  ACCOUNT NO:   [de-identified]                           ADMIT DATE: 2020  PROVIDER:     Bianca Nieves MD    CHIEF COMPLAINT:  Multiple falls, generalized weakness, emesis. HISTORY OF PRESENT ILLNESS:  The patient is a 25-year-old [place]  American female with multiple medical problems. The patient presented  to emergency room because of recurrent falls at home. She has  generalized weakness and nausea and vomiting. She was recently admitted  to hospital back in 2019. She was seen by cardiologist as well as  gastroenterologist.  The patient had EGD by Dr. Colleen Ortiz, which revealed  erosive duodenitis and small hiatal hernia. The patient was started on  proton pump inhibitor as well as Carafate. She also had the CT scan of  the abdomen, which was unremarkable. She had 2D echo and a stress test  and there was no evidence of ischemia. Her ejection fraction was  normal.  The patient was treated symptomatically with intravenous fluid  as well and her mild acute kidney injury did improve. She was also  noted to have urinary tract infection and she was treated with Keflex as  well. Since discharge, she was seen in the office. She was slowly and  gradually improving. Over the last couple of days, her weakness has  worsened and had recurrent falls. She presented to emergency room  today. She was seen and evaluated by Dr. Sami Cole. On the CT scan  of the head, there was a concern for ventricular dilatation, normal  pressure hydrocephalus. Initial lactic acid was elevated to 3.0. Remainder of the other labs looked remarkable. The patient has been  admitted for further evaluation.   She will also hematocrit 33.4,  platelet count 049,127. Differential count is normal.  Lipase 56. Metabolic panel reveals sodium 133, potassium 4.1, chloride 92, CO2 is  21, BUN 15, creatinine 2.1, glucose 137, calcium 8.5, bilirubin 0.6, ALT  24, AST 49. Lactic acid 3.0. Troponin less than 0.010. Rapid influenza A and B  antigens were negative. CT scan of the head, no acute intracranial hemorrhage. Ventricular  dilatation appears slightly out of proportion to the degree of the sulci  atrophy and communicating hydrocephalus could be considered. Repeat lactic acid is 1.8. IMPRESSION:  1. Generalized weakness, recurrent falls, rule out normal pressure  hydrocephalus. 2.  Recent diagnosis of erosive duodenitis. 3.  Hypertension. 4.  Coronary artery disease. 5.  Hyperlipidemia. 6.  Diabetes mellitus. 7.  Chronic kidney disease. 8.  History of gastroesophageal reflux disease and hiatal hernia. PLAN:  The patient was seen and evaluated in the emergency room. The  patient has been admitted to medical floor on telemetry. The patient's  home medicines were reviewed and reconciled. The patient will be  started on intravenous fluid. She will be treated symptomatically. Nephrology will be consulted to rule out normal pressure hydrocephalus. Further recommendations to follow.         Del Gutierrez MD    D: 01/13/2020 23:48:32       T: 01/13/2020 23:52:10     [email or web address]  Job#: [id number]     Doc#: [id number]    CC:  Jone Denver, MD

## 2020-01-14 NOTE — PLAN OF CARE
Nutrition Problem:  Moderate malnutrition, In context of chronic illness  Intervention: Food and/or Nutrient Delivery: Continue current diet, Start ONS  Nutritional Goals: Pt will consume >75% of all meals and supplements provided

## 2020-01-14 NOTE — ED PROVIDER NOTES
Emergency Department Encounter    Patient: Eliane Alfredo  MRN: [id number]  : 1940  Date of Evaluation: 2020  ED Provider:  Janny Garcia    Triage Chief Complaint:   Fall (increased falls; fell today in bathroom today; denies loc and hitting head) and Emesis    [place]:  Eliane Alfredo is a 67 y.o. female that presents with multiple complaints, she has had nausea vomiting, fatigue, weight loss, admitted to the hospital in the last month or so, they did not find much, she continues to feel worse, now she is weak and off balance, falling. No visual changes, no diarrhea, no cough or fevers. She seen her doctor as an outpatient, no definitive cause has been found yet.     ROS - see HPI, below listed is current ROS at time of my eval:  General:  No fevers, no chills, + weakness  Eyes:  No recent vison changes, no discharge  ENT:  No sore throat, no nasal congestion, no hearing changes  Cardiovascular:  No chest pain, no palpitations  Respiratory:  No shortness of breath, no cough, no wheezing  Gastrointestinal:  + pain, + nausea, no vomiting, no diarrhea  Musculoskeletal:  No muscle pain, no joint pain  Skin:  No rash, no pruritis, no easy bruising  Neurologic:  No speech problems, no headache  Genitourinary:  No dysuria, no hematuria  Endocrine:  No unexpected weight gain, no unexpected weight loss  Extremities:  no edema, no pain    Past Medical History:   Diagnosis Date    Acid reflux     Arthritis     \"Back\"    Back pain     \"Back Hurts Sometimes\"    CAD (coronary artery disease)     Sees Dr. Russell Moreno    Diabetes mellitus [facility]) Dx 's    \"Borderline\"    Hiatal hernia     Hx of blood clots     \"Blood Clots Legs After Heart Surgery\"    Hx of migraines     HX OTHER MEDICAL     Primary Care Physician Is Dr. Mirna Crawford    Hyperlipidemia     Hypertension     IBS (irritable bowel syndrome)     Right Breast Cancer 2005 Or 2006     Right Breast Mastectomy    Shortness of breath on exertion     UTI (urinary tract infection) In Past    No Current Symptoms    Wears dentures     Full Upper    Wears glasses     To Read     Past Surgical History:   Procedure Laterality Date    APPENDECTOMY  Unsure When    BREAST SURGERY Right 2005 Or 2006    Right Mastectomy For Breast Cancer    CARDIAC SURGERY  2001    CABG (Two Bypasses)    CHOLECYSTECTOMY, LAPAROSCOPIC  1990's Or 2000's    COLONOSCOPY  \"Two\" Last Done 2000's    Polyps Removed In Past    DENTAL SURGERY      All Upper Teeth Extracted In Past    DILATATION, ESOPHAGUS  \"3 Or 4\" Last Done 7-14    EYE SURGERY Bilateral 2000's    Cataracts With Lens Implants    GASTRIC FUNDOPLICATION  4/6/67    Robotic asssited laprascopic nissen fundoplication    HERNIA REPAIR  [address]    Umbilical Hernia Repair    HIATAL HERNIA REPAIR  2014    INGUINAL HERNIA REPAIR Right 1980's Or 1990's    TONSILLECTOMY  1970's    TUBAL LIGATION  1965    UPPER GASTROINTESTINAL ENDOSCOPY N/A 12/27/2019    EGD BIOPSY performed by Cheryle Guillen MD at [address] EXTRACTION      Three Removed In Past     Family History   Problem Relation Age of Onset    Heart Disease Mother         AAA    Early Death Father 28        Heart Attack    Heart Disease Father         Heart Attack     Early Death Brother         In His 42's Or 52's    Early Death Brother 40        Heart Attack    Heart Disease Brother         Heart Attack    Other Daughter         Pseudo Brain Tumor    Early Death Son 28     Social History     Socioeconomic History    Marital status: Single     Spouse name: Not on file    Number of children: Not on file    Years of education: Not on file    Highest education level: Not on file   Occupational History    Not on file   Social Needs    Financial resource strain: Not on file    Food insecurity:     Worry: Not on file     Inability: Not on file    Transportation needs:     Medical: Not on file     Non-medical: Not on file   Tobacco Use    Smoking status: Former Smoker     Years: 7.00     Start date: 1971     Last attempt to quit: 1978     Years since quittin.4    Smokeless tobacco: Never Used   Substance and Sexual Activity    Alcohol use: No    Drug use: No     Comment: \"Smoked About 2 Or 3 Cigarettes A Day When Quit, I Didn't Ever Inhale\"    Sexual activity: Never   Lifestyle    Physical activity:     Days per week: Not on file     Minutes per session: Not on file    Stress: Not on file   Relationships    Social connections:     Talks on phone: Not on file     Gets together: Not on file     Attends Alevism service: Not on file     Active member of club or organization: Not on file     Attends meetings of clubs or organizations: Not on file     Relationship status: Not on file    Intimate partner violence:     Fear of current or ex partner: Not on file     Emotionally abused: Not on file     Physically abused: Not on file     Forced sexual activity: Not on file   Other Topics Concern    Not on file   Social History Narrative    Not on file     No current facility-administered medications for this encounter. Current Outpatient Medications   Medication Sig Dispense Refill    pantoprazole (PROTONIX) 40 MG tablet Take 1 tablet by mouth every morning (before breakfast) 30 tablet 3    cephALEXin (KEFLEX) 250 MG capsule Take 1 capsule by mouth 3 times daily 21 capsule 0    triamterene-hydrochlorothiazide (MAXZIDE-25) 37.5-25 MG per tablet TAKE 1 TABLET BY MOUTH ONE TIME A DAY  30 tablet 4    ondansetron (ZOFRAN ODT) 4 MG disintegrating tablet Take 1 tablet by mouth every 8 hours as needed for Nausea 15 tablet 0    atorvastatin (LIPITOR) 80 MG tablet TAKE 1 TABLET BY MOUTH ONE TIME A DAY  30 tablet 4    lisinopril (PRINIVIL;ZESTRIL) 2.5 MG tablet TAKE 1 TABLET BY MOUTH ONE TIME A DAY  30 tablet 4    sucralfate (CARAFATE) 1 GM tablet Take 1 g by mouth 4 times daily.       metoprolol (LOPRESSOR) 100 MG tablet Take 100 mg by mouth 2 times daily.  metformin (GLUCOPHAGE) 500 MG tablet Take 500 mg by mouth 2 times daily (with meals).  gemfibrozil (LOPID) 600 MG tablet Take 600 mg by mouth 2 times daily. No Known Allergies    Nursing Notes Reviewed    Physical Exam:  Triage VS:    ED Triage Vitals   Enc Vitals Group      BP 01/13/20 1640 (!) 143/64      Pulse 01/13/20 1637 86      Resp 01/13/20 1637 16      Temp 01/13/20 1637 97.8 °F (36.6 °C)      Temp src --       SpO2 01/13/20 1640 98 %      Weight 01/13/20 1637 227 lb (103 kg)      Height 01/13/20 1637 5' 4\" (1.626 m)      Head Circumference --       Peak Flow --       Pain Score --       Pain Loc --       Pain Edu? --       Excl. in [address]? --        My pulse ox interpretation is - normal    General appearance:  No acute distress. Skin:  Warm. Dry. Eye:  Extraocular movements intact. Ears, nose, mouth and throat:  Oral mucosa moist   Neck:  Trachea midline. Extremity:  No swelling. Normal ROM     Heart:  Regular rate and rhythm, normal S1 & S2, no extra heart sounds. Perfusion:  intact  Respiratory:  Lungs clear to auscultation bilaterally. Respirations nonlabored. Abdominal:  Normal bowel sounds. Soft. Nontender. Non distended. Neurological:  Alert and oriented times 3.   Moves all extremities equally, cranial nerves grossly intact, no drift, patient does have an off-balance appearance when she ambulates    I have reviewed and interpreted all of the currently available lab results from this visit (if applicable):  Results for orders placed or performed during the hospital encounter of 01/13/20   Rapid Flu Swab   Result Value Ref Range    Rapid Influenza A Ag NEGATIVE NEGATIVE    Rapid Influenza B Ag NEGATIVE NEGATIVE   CBC auto diff   Result Value Ref Range    WBC 7.8 4.0 - 10.5 K/CU MM    RBC 3.63 (L) 4.2 - 5.4 M/CU MM    Hemoglobin 10.8 (L) 12.5 - 16.0 GM/DL    Hematocrit 33.4 (L) 37 - 47 %    MCV 92.0 78 - 100 FL    MCH 29.8 27 - 31 PG MCHC 32.3 32.0 - 36.0 %    RDW 17.1 (H) 11.7 - 14.9 %    Platelets 091 582 - 406 K/CU MM    MPV 10.8 7.5 - 11.1 FL    Differential Type AUTOMATED DIFFERENTIAL     Segs Relative 72.3 (H) 36 - 66 %    Lymphocytes % 18.1 (L) 24 - 44 %    Monocytes % 8.7 (H) 0 - 4 %    Eosinophils % 0.1 0 - 3 %    Basophils % 0.4 0 - 1 %    Segs Absolute 5.6 K/CU MM    Lymphocytes Absolute 1.4 K/CU MM    Monocytes Absolute 0.7 K/CU MM    Eosinophils Absolute 0.0 K/CU MM    Basophils Absolute 0.0 K/CU MM    Nucleated RBC % 0.4 %    Total Nucleated RBC 0.0 K/CU MM    Total Immature Neutrophil 0.03 K/CU MM    Immature Neutrophil % 0.4 0 - 0.43 %   CMP   Result Value Ref Range    Sodium 133 (L) 135 - 145 MMOL/L    Potassium 4.1 3.5 - 5.1 MMOL/L    Chloride 92 (L) 99 - 110 mMol/L    CO2 21 21 - 32 MMOL/L    BUN 15 6 - 23 MG/DL    CREATININE 2.1 (H) 0.6 - 1.1 MG/DL    Glucose 137 (H) 70 - 99 MG/DL    Calcium 8.5 8.3 - 10.6 MG/DL    Alb 4.3 3.4 - 5.0 GM/DL    Total Protein 8.0 6.4 - 8.2 GM/DL    Total Bilirubin 0.6 0.0 - 1.0 MG/DL    ALT 24 10 - 40 U/L    AST 49 (H) 15 - 37 IU/L    Alkaline Phosphatase 67 40 - 129 IU/L    GFR Non- 23 (L) >60 mL/min/1.73m2    GFR  28 (L) >60 mL/min/1.73m2    Anion Gap 20 (H) 4 - 16   Lactic Acid, Plasma   Result Value Ref Range    Lactate (HH) 0.4 - 2.0 mMOL/L     3.0  LACT CALLED TO MALIHA WILSON RN ON [id number] AT [address]T     Lipase   Result Value Ref Range    Lipase 56 13 - 60 IU/L   Troponin   Result Value Ref Range    Troponin T <0.010 <0.01 NG/ML   EKG 12 Lead   Result Value Ref Range    Ventricular Rate 87 BPM    Atrial Rate 87 BPM    P-R Interval 160 ms    QRS Duration 86 ms    Q-T Interval 366 ms    QTc Calculation (Bazett) 440 ms    P Axis 74 degrees    R Axis 39 degrees    T Axis 62 degrees    Diagnosis       Normal sinus rhythm  Normal ECG  When compared with ECG of 25-DEC-2019 01:22,  No significant change was found        Radiographs (if obtained):  Radiologist's Report Reviewed:  Ct Head Wo Contrast    Result Date: 1/13/2020  EXAMINATION: CT OF THE HEAD WITHOUT CONTRAST  1/13/2020 8:57 pm TECHNIQUE: CT of the head was performed without the administration of intravenous contrast. Dose modulation, iterative reconstruction, and/or weight based adjustment of the mA/kV was utilized to reduce the radiation dose to as low as reasonably achievable. COMPARISON: April 21, 2015 HISTORY: ORDERING SYSTEM PROVIDED HISTORY: ataxia TECHNOLOGIST PROVIDED HISTORY: Reason for exam:->ataxia Has a \"code stroke\" or \"stroke alert\" been called? ->No Reason for Exam: ataxia Acuity: Acute Type of Exam: Initial Additional signs and symptoms: no Relevant Medical/Surgical History: none FINDINGS: BRAIN/VENTRICLES: No acute intracranial hemorrhage. No mass effect. No midline shift. Ventricular dilatation appears slightly out of proportion to the degree of sulcal atrophy. Remote right basal ganglia lacunar infarct. Mild periventricular and subcortical white matter hypodensities are nonspecific but likely represent microvascular disease. ORBITS: The visualized portion of the orbits demonstrate no acute abnormality. SINUSES: The visualized paranasal sinuses and mastoid air cells demonstrate no acute abnormality. SOFT TISSUES/SKULL:  No acute abnormality of the visualized skull or soft tissues. No acute intracranial hemorrhage. Ventricular dilatation appears slightly out of proportion to the degree of sulcal atrophy and communicating hydrocephalus could be considered. Xr Chest Portable    Result Date: 1/13/2020  EXAMINATION: ONE XRAY VIEW OF THE CHEST 1/13/2020 4:56 pm COMPARISON: Chest radiograph 11/10/2018. HISTORY: ORDERING SYSTEM PROVIDED HISTORY: cough, fatigue TECHNOLOGIST PROVIDED HISTORY: Reason for exam:->cough, fatigue Reason for Exam: cough, fatigue Initial encounter. FINDINGS: Status post median sternotomy.   The cardiomediastinal silhouette is within normal limits. No pneumothorax, vascular congestion, consolidation, or pleural effusion is identified. No acute osseous abnormality. No acute process. EKG (if obtained): (All EKG's are interpreted by myself in the absence of a cardiologist)      MDM:  Patient here with multiple complaints, subacute, some chronic, work-up initiated, neurologic exam is nonfocal, vital signs are not unstable, influenza negative, no leukocytosis, BUN normal, creatinine 2.1, lactate elevated at 3, given IV fluids, lipase normal, troponin not elevated, CT head does not have acute findings. CT head sent no acute findings, there is some changes that could be concerning for hydrocephalus, further work-up will be required, I spoke with her primary care physician who will admit for further work-up and treatment    Clinical Impression:  1. General weakness    2. Ataxia      Disposition referral (if applicable):  No follow-up provider specified. Disposition medications (if applicable):  New Prescriptions    No medications on file     ED Provider Disposition Time  DISPOSITION Decision To Admit 01/13/2020 10:07:13 PM      Comment: Please note this report has been produced using speech recognition software and may contain errors related to that system including errors in grammar, punctuation, and spelling, as well as words and phrases that may be inappropriate. Efforts were made to edit the dictations.         Josephine Nyhan, MD  01/13/20 9998

## 2020-01-15 NOTE — DISCHARGE INSTR - COC
History   Administered Date(s) Administered    Pneumococcal Polysaccharide ([id number]) 08/18/2012       Active Problems:  Patient Active Problem List   Diagnosis Code    Hypertension I10    Diabetes mellitus ([facility] [address] E11.9    Hyperlipidemia E78.5    Acid reflux K21.9    Chest pain R07.9    CAD (coronary artery disease) I25.10    RUSTY (acute kidney injury) ([facility] [address] N17.9    Lower abdominal pain R10.30    Intractable nausea and vomiting R11.2    Duodenitis K29.80    Recurrent falls R29.6    Moderate malnutrition (HCC) E44.0       Isolation/Infection:   Isolation          No Isolation        Patient Infection Status     None to display          Nurse Assessment:  Last Vital Signs: /62   Pulse 77   Temp 98.3 °F (36.8 °C) (Oral)   Resp 18   Ht 5' 4\" (1.626 m)   Wt 217 lb (98.4 kg)   SpO2 98%   BMI 37.25 kg/m²     Last documented pain score (0-10 scale): Pain Level: 7  Last Weight:   Wt Readings from Last 1 Encounters:   01/15/20 217 lb (98.4 kg)     Mental Status:  oriented and alert    IV Access:  - None    Nursing Mobility/ADLs:  Walking   Assisted  Transfer  Assisted  Bathing  Assisted  Dressing  Assisted  Toileting  Assisted  Feeding  Independent  Med Admin  Assisted  Med Delivery   whole    Wound Care Documentation and Therapy:  Incision 08/01/14 Abdomen Mid;Right;Left (Active)   Number of days: 1993        Elimination:  Continence:   · Bowel: Yes  · Bladder: Yes  Urinary Catheter: None   Colostomy/Ileostomy/Ileal Conduit: No       Date of Last BM: 01/16/20    Intake/Output Summary (Last 24 hours) at 1/15/2020 1529  Last data filed at 1/15/2020 1510  Gross per 24 hour   Intake 1377.5 ml   Output --   Net 1377.5 ml     I/O last 3 completed shifts:   In: 1317.5 [I.V.:1317.5]  Out: -     Safety Concerns:     History of Falls (last 30 days) and At Risk for Falls    Impairments/Disabilities:      None      Patient's personal belongings (please select all that are sent with patient):  None    RN

## 2020-01-15 NOTE — CONSULTS
The patient is hemodynamically  stable and is on Protonix. REVIEW OF SYSTEMS:  CENTRAL NERVOUS SYSTEM:  The patient denies headache or focal  sensorimotor symptoms. CARDIOVASCULAR SYSTEM:  No history of chest pain, shortness of breath,  or leg swelling. GENITOURINARY SYSTEM:  No history of dysuria, pyuria, or hematuria. MUSCULOSKELETAL SYSTEM:  The patient complains of generalized weakness. RESPIRATORY SYSTEM:  No history of cough, hemoptysis, fever, or chills. PAST MEDICAL HISTORY:  Significant for history of hypertension; diabetes  mellitus; coronary artery disease, status post CABG; hyperlipidemia;  carcinoma of the right breast, status post mastectomy; gastroesophageal  reflux disease, status post Nissen; osteoarthritis and migraine  headaches. FAMILY HISTORY:  Noncontributory. MEDICATIONS:  Please refer to the chart. SOCIOECONOMIC HISTORY:  No history of EtOH abuse. The patient is a  former smoker. PAST SURGICAL HISTORY:  The patient has had hysterectomy, appendectomy,  cholecystectomy done, right mastectomy, tonsillectomy and adenoidectomy,  abdominal wall herniorrhaphy and also hiatal hernia repair and dental  surgery. ALLERGIES:  No known drug allergies. PHYSICAL EXAMINATION:  GENERAL:  Shows a 72-year-old [place] American female who is obese,  lying flat in the bed, in no acute distress. She is awake, alert, and  oriented and pleasant to talk with. VITAL SIGNS:  Stable. HEENT:  Shows skull to be atraumatic. NECK:  Supple. CHEST:  Clear. HEART:  S1 and S2 are normal.  ABDOMEN:  Soft, nontender, and nondistended. Liver and spleen are not  palpable. Bowel sounds are present. RECTAL:  Deferred. CNS:  Shows the patient to be awake, alert, and oriented. There are no  focal sensorimotor signs. MUSCULOSKELETAL SYSTEM:  Showed evidence of degenerative joint disease  changes. LABORATORY DATA:  As above mentioned.     IMPRESSION:  A 72-year-old [place] American female with

## 2020-01-15 NOTE — PLAN OF CARE
Problem: Falls - Risk of:  Goal: Will remain free from falls  Description  Will remain free from falls  1/15/2020 1356 by Trevor Smith RN  Outcome: Met This Shift  1/15/2020 0048 by Lisa Spicer RN  Outcome: Ongoing  Goal: Absence of physical injury  Description  Absence of physical injury  1/15/2020 1356 by Trevor Smith RN  Outcome: Met This Shift  1/15/2020 0048 by Lisa Spicer RN  Outcome: Ongoing     Problem: Pain:  Goal: Pain level will decrease  Description  Pain level will decrease  1/15/2020 1356 by Trevor Smith RN  Outcome: Met This Shift  1/15/2020 0048 by Lisa Spicer RN  Outcome: Ongoing     Problem: Falls - Risk of:  Goal: Will remain free from falls  Description  Will remain free from falls  1/15/2020 1356 by Trevor Smith RN  Outcome: Met This Shift  1/15/2020 0048 by Lisa Spicer RN  Outcome: Ongoing  Goal: Absence of physical injury  Description  Absence of physical injury  1/15/2020 1356 by Trevor Smith RN  Outcome: Met This Shift  1/15/2020 0048 by Lisa Spicer RN  Outcome: Ongoing     Problem: Pain:  Goal: Pain level will decrease  Description  Pain level will decrease  1/15/2020 1356 by Trevor Smith RN  Outcome: Met This Shift  1/15/2020 0048 by Lisa Spicer RN  Outcome: Ongoing

## 2020-01-15 NOTE — PROGRESS NOTES
Physical Therapy  [facility] ACUTE CARE PHYSICAL THERAPY EVALUATION  Twyla Kim, 1940, 4025/4025-A, 1/15/2020    History  [place]:  The primary encounter diagnosis was General weakness. A diagnosis of Ataxia was also pertinent to this visit. Patient  has a past medical history of Acid reflux, Arthritis, Back pain, CAD (coronary artery disease), Diabetes mellitus ([address] Hiatal hernia, Hx of blood clots, Hx of migraines, HX OTHER MEDICAL, Hyperlipidemia, Hypertension, IBS (irritable bowel syndrome), Right Breast Cancer, Shortness of breath on exertion, UTI (urinary tract infection), Wears dentures, and Wears glasses. Patient  has a past surgical history that includes Cardiac surgery (2001); Dilatation, esophagus (\"3 Or 4\" Last Done 7-14); eye surgery (Bilateral, 2000's); Dental surgery; [place] tooth extraction; Appendectomy (Unsure When); Colonoscopy (\"Two\" Last Done 2000's); Cholecystectomy, laparoscopic (1990's Or 2000's); Breast surgery (Right, 2005 Or 2006); Inguinal hernia repair (Right, 1980's Or 1990's); hernia repair (1960's); Tubal ligation (1965); Tonsillectomy (1970's); Gastric fundoplication (8/0/03); hiatal hernia repair (2014); and Upper gastrointestinal endoscopy (N/A, 12/27/2019). Subjective:  Patient states:  \"I don't really know why but I woke up and just couldn't walk\"   Pain:  5/10 stomach   Communication with other providers: Co-eval with Joanna CHAN   Restrictions: general precautions, fall risk, telemetry     Home Setup/Prior level of function  Social/Functional History  Lives With: Daughter(+ son-in-law.  Someone is home most of the time with pt)  Type of Home: Apartment  Home Layout: One level  Home Access: Level entry  Bathroom Shower/Tub: Tub/Shower unit  Bathroom Toilet: Standard  Bathroom Equipment: Shower chair  Home Equipment: Cane, Rolling walker  ADL Assistance: Independent  Homemaking Assistance: Needs assistance(helps dtr when she can)  Ambulation Assistance: Independent(typically uses a cane)  Transfer Assistance: Independent  Active : No  Patient's  Info: dtr   Additional Comments: recent fall from tripping in the bathroom     Examination of body systems (includes body structures/functions, activity/participation limitations):  · Observation:  Sitting in recliner upon arrival. Aide finishing sponge bath with pt. Pt very agreeable and cooperative to work with therapy. Swelling to BLEs. · Vision:  Reading glasses   · Hearing:  [facility]   · Cardiopulmonary:  [facility]   · Orientation: [facility]     Musculoskeletal  · ROM R/L:  Knee flexion limited to ~90deg with small amount of over pressure. All other major LE joint motions grossly WFL. · Strength R/L:  Hips 4-/5, knees and ankles 4+/5. Dec strength observed in function and endurance. · Neuro:  Reports occasional numbness/tingling to feet. Able to detect light touch. Mobility/treatment:   · Rolling L/R:  NT   · Supine to sit:  NT, sitting in recliner upon arrival   · Transfers:   · Sit to stand: Hammad for lift and fwd weight shift over SHON. VCs for set up of LEs (knees flexed as much as she was able to) and UEs (on arm rest of chair). Dec comprehension of task needing multiple cues prior to succeeding. Performed transition 2x from recliner. · Stand to sit: Hammad lacking eccentric control due to limited knee flexion. VCs for sequencing transition of UEs from RW to recliner chair   · Step pivot: (2x) Hammad for balance and device management sequencing full pivot turn with AD prior to sitting. · Sitting balance:  SBA at edge of recliner   · Standing balance:  CGA at sink performing self care tasks. When using bilat hands for manipulating objects, needed to support on counter top through forearms. Tolerated ~5 minutes in standing though sinking in knees with fatigue needing seat rest break. · Gait: 10ft x 2 + 75ft with RW CGA with occasional Hammad for balance and facilitation of weight shift laterally.  Pt demonstration fwd posture with slightly flexed bilat knees. Very slow step. Inc stance time on each LE with inc difficulty balancing when on left. Slightly uncoordinated with wide SHON and inconsistent step length (mostly larger steps). · Educated pt on POC, role of PT, DME, discharge recommendations. VCs for sequencing, posture, weight shift, balance, UE/LE placement to inc safety and indep with mobility. [facility] 6 Clicks Inpatient Mobility:  AM-PAC Inpatient Mobility Raw Score : 15    Safety: patient left in chair, call light within reach,  gait belt used. Assessment: Body structures, Functions, Activity limitations: Decreased functional mobility ; Decreased safe awareness; Decreased endurance; Decreased ROM; Decreased balance; Decreased ADL status; Decreased strength; Decreased cognition; Decreased sensation; Decreased posture  Pt is a 67year old female admitted with recurrent falls. 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 Recommend subacute rehab once medically stable. At baseline, she is Bryan with gross mobility and ADLs. She is currently requiring Hammad with limited standing/ambulation tolerance. She would benefit from continued therapy to address current deficits, dec potential fall risk, and restore function. Complexity: Moderate  Prognosis: Good, no significant barriers to participation at this time. Plan Times per week: 4/week, 1 week,   Discharge Recommendations: Subacute/Skilled Nursing Facility  Equipment: continue to assess.  No needs at this time     Goals:  Short term goals  Time Frame for Short term goals: 1 week   Short term goal 1: Pt will perform sit><supine SBA   Short term goal 2: Pt will transfer to all surfaces SBA   Short term goal 3: Pt will ambulate 150ft with RW SBA   Short term goal 4: Pt will perform standing dynamic activity x 5 minutes SBA        Treatment plan:  Strengthening; Transfer Training; Balance

## 2020-01-15 NOTE — PROGRESS NOTES
No further neurological recommendations  We will sign off and have patient follow up outpatient  Thank you  Dori Devi CNP

## 2020-01-15 NOTE — PROGRESS NOTES
Occupational Therapy    [facility] ACUTE CARE OCCUPATIONAL THERAPY EVALUATION  Mohan Moreno, 1940, 4025/4025-A, 1/15/2020    History  [place]:  The primary encounter diagnosis was General weakness. A diagnosis of Ataxia was also pertinent to this visit. Patient  has a past medical history of Acid reflux, Arthritis, Back pain, CAD (coronary artery disease), Diabetes mellitus ([address] Hiatal hernia, Hx of blood clots, Hx of migraines, HX OTHER MEDICAL, Hyperlipidemia, Hypertension, IBS (irritable bowel syndrome), Right Breast Cancer, Shortness of breath on exertion, UTI (urinary tract infection), Wears dentures, and Wears glasses. Patient  has a past surgical history that includes Cardiac surgery (2001); Dilatation, esophagus (\"3 Or 4\" Last Done 7-14); eye surgery (Bilateral, 2000's); Dental surgery; [place] tooth extraction; Appendectomy (Unsure When); Colonoscopy (\"Two\" Last Done 2000's); Cholecystectomy, laparoscopic (1990's Or 2000's); Breast surgery (Right, 2005 Or 2006); Inguinal hernia repair (Right, 1980's Or 1990's); hernia repair (1960's); Tubal ligation (1965); Tonsillectomy (1970's); Gastric fundoplication (8/7/54); hiatal hernia repair (2014); and Upper gastrointestinal endoscopy (N/A, 12/27/2019). Subjective:  Patient states:  \"I just got all cleaned up\". Pain:  No.    Communication with other providers:  Handoff to RN, co-eval with PT. Restrictions: General Precautions, Fall Risk    Home Setup/Prior level of function  Social/Functional History  Lives With: Daughter(+ son-in-law.  Someone is home most of the time with pt)  Type of Home: Apartment  Home Layout: One level  Home Access: Level entry  Bathroom Shower/Tub: Tub/Shower unit  Bathroom Toilet: Standard  Bathroom Equipment: Shower chair  Home Equipment: Cane, Rolling walker  ADL Assistance: Independent  Homemaking Assistance: Needs assistance(helps dtr when she can)  Ambulation Assistance: Independent(typically uses a cane)  Transfer Assistance: Independent  Active : No  Patient's  Info: dtr   Additional Comments: recent fall from tripping in the bathroom     Examination of body systems (includes body structures/functions, activity/participation limitations):  · Observation:  Supine in bed upon arrival, agreeable to therapy  · Vision:  Glasses  · Hearing:  [facility]   · Cardiopulmonary:  No 02 needs      Body Systems and functions:  · ROM R/L:  WFL. · Strength R/L:  4/5,   · Sensation: WFL  · Tone: Normal  · Coordination: Decreased speed/accuracy during functional movements  · Perception: WNL    Activities of Daily Living (ADLs):  · Feeding: Bryan  · Grooming: CGA (in stand at sink for oral care and washing hands. Pt using BUE forearms for support)  · UB bathing: Supervision   · LB bathing: CGA  · UB dressing: Supervision  · LB dressing: CGA  · Toileting: CGA    Cognitive and Psychosocial Functioning:  · Overall cognitive status: WFL  · Affect: Normal     Mobility:  · Supine to sit:  DNT (pt sitting upright in reclining chair at beginning and end of session)  · Transfers: Hammad from reclining chair up to RW  · Sitting balance:  Supervision. · Standing balance:  CGA (W/ RW)  · Functional Mobility: CGA w/ RW (to/from bathroom and to/from room.  See PT notes for gait details)  · Toilet/Shower Transfers: DNT             AM-PAC Daily Activity Inpatient   How much help for putting on and taking off regular lower body clothing?: A Little  How much help for Bathing?: A Little  How much help for Toileting?: A Little  How much help for putting on and taking off regular upper body clothing?: None  How much help for taking care of personal grooming?: A Little  How much help for eating meals?: None  AM-PAC Inpatient Daily Activity Raw Score: 20  AM-PAC Inpatient ADL T-Scale Score : 42.03  ADL Inpatient CMS 0-100% Score: 38.32  ADL Inpatient CMS G-Code Modifier : CJ    Treatment:  Self Care Training:   Cues were given for safety, sequence, UE/LE placement, visual cues, and balance. Activities performed today included grooming      Safety: patient left in chair with chair alarm, call light within reach, RN notified, gait belt used. Assessment:  Pt is a 68 yo female admitted from home for recurrent falls. Pt at baseline is Independent for ADLs needs assistance for high level IADLs and Independent for functional transfers/mobility w/ AD. Pt currently presents w/ deficits in ADL and high level IADL independence, functional activity tolerance, dynamic sitting and standing balance and tolerance and functional transfers, BUE strength and coordination Pt would benefit from continued acute care OT services w/ discharge to SNF  Complexity: Moderate  Prognosis: Good, no significant barriers to participation at this time.    Plan  Times per week: 2x+  Times per day: Daily  Current Treatment Recommendations: Endurance Training, Patient/Caregiver Education & Training, Equipment Evaluation, Education, & procurement, Balance Training, Pain Management, Self-Care / ADL, Strengthening, Functional Mobility Training, Safety Education & Training, Home Management Training, Neuromuscular Re-education     Equipment: defer    Goals:  Pt goal: go home  Time Frame for STGs: discharge  Goal 1: Pt will perform UE ADLs Independent  Goal 2: Pt will perform LE ADLs Independent  Goal 3: Pt will perform toileting Independent  Goal 4: Pt will perform functional transfer w/ AD Bryan  Goal 5: Pt will perform functional mobility w/ AD Bryan  Goal 6: Pt will perform therex/theract in order to increase functional activity tolerance and dynamic standing balance    Treatment plan:  Pt will perform functional task in stand reaching in all 3 planes in order to increase dynamic standing balance and functional activity tolerance    Recommendations for NURSING activity: Up to chair for all 3 meals and up to standard commode for all toileting needs    Time:   Time in: 1026  Time out: 1056  Timed treatment minutes: 25 minutes  Total time: 30 minutes    Electronically signed by:    Ender DANIELSON/L [id number]  12:36 PM,1/15/2020

## 2020-01-15 NOTE — PROGRESS NOTES
Pt lost IV access. Multiple attempts were made without success. Dr. Linda Burgess notified, encouraged increase in PO intake and gave ok to leave out at this time.      Electronically signed by Elizabeth Lucero RN on 1/15/2020 at 12:40 AM

## 2020-01-15 NOTE — PROGRESS NOTES
Patient gets up to BR with assist  with walker, Wears attends. Poor appetite. Went to CT and MRI today.

## 2020-01-16 NOTE — PROGRESS NOTES
Occupational Therapy  . Occupational Therapy Treatment Note  Name: Gino Stratton MRN: [id number] :   1940   Date:  2020   Admission Date: 2020 Room:  [address] [place], [place] [address]A     Restrictions/Precautions:    General precautions; Fall Risk    Communication with other providers:  NELIDA  Adrianne cleared pt for OT Tx session.  Valente Alexx stopped in to answer family questions regarding evaluating therapists' recommendations for SNF vs family queries into [place] [address] services. Subjective:  Patient states:  Pt initially reluctant to actively participate. With education for role of OT and rationale for therapeutic intervention, and c family support and encouragement, pt agreeable to actively participate. Pain:   Location, Type, Intensity (0/10 to 10/10):  0/10, denies pain    Objective:    Observation:  Pt received in semi-fowlers c multiple family members present. Objective Measures:  N/A    Treatment, including education:  Therapeutic Activity Training:   Therapeutic activity training was instructed today. Cues were given for safety, sequence, UE/LE placement, awareness, and balance. Activities performed today included bed mobility training, sup-sit, sit-stand, SPT. Supine to sit: CGA for trunk + bed features + increased time  Sit to supine: Min A for last LE to transition to bed  Scooting: CGA to trunk for unsteadiness + cues + increased time and during pt's repositioning upward in bed c pt using BUE to lift bottom off bed and scoot upward. Sit to stand: Min A c RW + min vc's for safe body positioning, cues for trunk extension  Stand to sit: CGA c RW  Functional Mobility: CGA/Min A c RW c increased assist for mild unsteadiness while following cues for side steps x4 along EOB to reposition toward [facility] prior to stand to sit.   Sitting balance / tolerance: SBA to CGA (for fatigue) during unsupported sitting EOB x8 minutes     Self Care Training:   Cues were given for safety, sequence, UE/LE placement, visual cues,

## 2020-01-16 NOTE — PROGRESS NOTES
INTERNAL MEDICINE PROGRESS NOTE        Fariha Dykes   1940   Primary Care Physician:  Shanda Owusu MD  Admit Date: 1/13/2020     Subjective:   Late entry. Pt seen earlier today around 8 am.   Pt is doing better today. Still feels nauseated and very weak. Denies chest pain, SOB. Remainder of ROS is unremarkable. Meds, labs and other notes reviewed. Appreciate neuro and GI eval. Ct chest and MRI brain results noted. Objective:   /65   Pulse 72   Temp 98.1 °F (36.7 °C) (Oral)   Resp 14   Ht 5' 4\" (1.626 m)   Wt 223 lb (101.2 kg)   SpO2 97%   BMI 38.28 kg/m²    Recent Labs     01/15/20  2106 01/16/20  0205 01/16/20  0807 01/16/20  1152   POCGLU 71 84 88 86       I/O last 3 completed shifts: In: 61 [P.O.:60]  Out: -   No intake/output data recorded.     Neck: no adenopathy and supple, symmetrical, trachea midline  Lungs: clear to auscultation bilaterally  Heart: regular rate and rhythm and S1, S2 normal  Abdomen: soft, non-tender; bowel sounds normal; no masses,  no organomegaly  Extremities: extremities normal, atraumatic, no cyanosis or edema  Neurologic: Grossly normal    Data Review  CBC with Differential:    Recent Labs     01/13/20 1743 01/14/20  0823   WBC 7.8 8.0   RBC 3.63* 3.25*   HGB 10.8* 9.7*   HCT 33.4* 30.9*    360   MCV 92.0 95.1   MCH 29.8 29.8   MCHC 32.3 31.4*   RDW 17.1* 17.3*   SEGSPCT 72.3* 46.0   BANDSPCT  --  1*   LYMPHOPCT 18.1* 38.0   MONOPCT 8.7* 14.0*   BASOPCT 0.4  --    MONOSABS 0.7 1.1   LYMPHSABS 1.4 3.0   EOSABS 0.0 0.1   BASOSABS 0.0  --    DIFFTYPE AUTOMATED DIFFERENTIAL MANUAL DIFFERENTIAL     CMP:    Recent Labs     01/13/20  1743 01/14/20  0823   * 135   K 4.1 3.6   CL 92* 95*   CO2 21 21   BUN 15 16   CREATININE 2.1* 2.2*   GFRAA 28* 26*   LABGLOM 23* 22*   GLUCOSE 137* 89   PROT 8.0  --    LABALBU 4.3  --    CALCIUM 8.5 8.0*   BILITOT 0.6  --    ALKPHOS 67  --    AST 49*  --    ALT 24  --      PT/INR:  No results for input(s): PROTIME, INR in the last 72 hours. Meds:    lisinopril  2.5 mg Oral Daily    metoprolol  100 mg Oral BID    pantoprazole  40 mg Oral QAM AC    sucralfate  1 g Oral 4x Daily    sodium chloride flush  10 mL Intravenous 2 times per day    enoxaparin  30 mg Subcutaneous Daily    insulin lispro  0-6 Units Subcutaneous TID WC    insulin lispro  0-3 Units Subcutaneous Nightly     PRN Meds: zolpidem, ondansetron, sodium chloride flush, magnesium hydroxide, ondansetron, glucose, dextrose, glucagon (rDNA), dextrose, acetaminophen    Assessment/Plan:   1. Generalized weakness, recurrent falls, rule out normal pressure  Hydrocephalus. Neurology rec appreciated. MRI normal.   2.  Recent diagnosis of erosive duodenitis. On PPI and Carafate. 3.  Hypertension. BP is stable. 4.  Coronary artery disease. Recent ECHO and stress test normal.   5.  Hyperlipidemia. 6.  Diabetes mellitus. On SSI coverage. 7.  Chronic kidney disease. 8.  History of gastroesophageal reflux disease and hiatal hernia. 9.  Await DC to SNF for rehab. 10. ? Depression. Will try Remeron.           Lissa Lanza MD  1/16/2020 12:32 PM

## 2020-01-16 NOTE — CARE COORDINATION
Spoke with pt this am and she was awaiting family to come dicussed discharge with her. Now family is here, she discussed plans with daughter who lives with her and they are wanting pt to return home with [address]. When went to speak with pt about [facility]. in room 2 other daughters there. One daughter does not want pt to return home she wants her to go to [address] [place] [address]. CM left room to give pt/daughters time to discuss discharge plan. 37  Spoke with pt and 2 daughters that she does not live with. Pt is agreeable to going to SNU, daughters reviewed list and would like [facility]. Called/faxed referral to Mariely at [facility].

## 2020-01-17 NOTE — PROGRESS NOTES
Nutrition Assessment    Type and Reason for Visit: Reassess    Nutrition Recommendations:   · Continue current diet and supplements  · Consider NG should intake remain <50% if within goals of care    Nutrition Assessment: Pt continues at high nutrition risk with inadequate oral intake: consuming 0-50% of all meals. Noted pt continues with nausea. May consider NG should pt intake fail to improve over next 24-48 hours with moderate malnutrition. Malnutrition Assessment:  · Malnutrition Status: Meets the criteria for moderate malnutrition  · Context: Chronic illness  · Findings of the 6 clinical characteristics of malnutrition (Minimum of 2 out of 6 clinical characteristics is required to make the diagnosis of moderate or severe Protein Calorie Malnutrition based on AND/ASPEN Guidelines):  1. Energy Intake-Less than or equal to 75% of estimated energy requirement, Greater than or equal to 3 months    2. Weight Loss-Unable to assess, unable to assess  3. Fat Loss-Mild subcutaneous fat loss, Orbital  4. Muscle Loss-Mild muscle mass loss, Temples (temporalis muscle)  5. Fluid Accumulation-Moderate to severe fluid accumulation, Extremities  6.  Strength-Not measured    Nutrition Risk Level: High    Nutrient Needs:  · Estimated Daily Total Kcal: 8416-7546 ([place] St. Jeor)  · Estimated Daily Protein (g): 55-65 (1-1.2 g/kg IBW)  · Estimated Daily Total Fluid (ml/day): 3412-3500 (1 mL/kcal)    Nutrition Diagnosis:   · Problem:  Moderate malnutrition, In context of chronic illness  · Etiology: related to Insufficient energy/nutrient consumption     Signs and symptoms:  as evidenced by Patient report of, Diet history of poor intake, Weight loss, Mild loss of subcutaneous fat, Mild muscle loss    Objective Information:  · Wound Type: None  · Current Nutrition Therapies:  · Oral Diet Orders: Carb Control 4 Carbs/Meal   · Oral Diet intake: 0%, 1-25%, 26-50%  · Oral Nutrition Supplement (ONS) Orders: Clear Liquid Oral Supplement  · Anthropometric Measures:  · Ht: 5' 4\" (162.6 cm)   · Current Body Wt: 220 lb (99.8 kg)  · Admission Body Wt: 227 lb (103 kg)  · Usual Body Wt: 250 lb (113.4 kg)(per pt)  · % Weight Change: Fluctations during los  · Ideal Body Wt: 120 lb (54.4 kg), % Ideal Body 183%  · BMI Classification: BMI 35.0 - 39.9 Obese Class II(37.8)    Nutrition Interventions:   Continue current diet, Continue current ONS  Continued Inpatient Monitoring, Education Not Indicated, Coordination of Care    Nutrition Evaluation:   · Evaluation: No progress toward goals   · Goals: Pt will consume >75% of all meals and supplements provided    · Monitoring: Meal Intake, Supplement Intake, Weight, Pertinent Labs, Chewing/Swallowing    Electronically signed by Gabi Powers RD, LD on 1/17/20 at 8:44 AM    Contact Number: [phone]

## 2020-01-17 NOTE — PROGRESS NOTES
Report called to Beatrice Shahid LPN at [facility]. All questions answered. Advised of transport time of 1630. AVS/NIMISHA faxed to [phone].

## 2020-01-17 NOTE — PLAN OF CARE
Problem: Risk for Impaired Skin Integrity  Goal: Tissue integrity - skin and mucous membranes  Description  Structural intactness and normal physiological function of skin and  mucous membranes.   Outcome: Ongoing     Problem: Falls - Risk of:  Goal: Will remain free from falls  Description  Will remain free from falls  1/17/2020 0749 by Jorge Chi RN  Outcome: Ongoing  1/17/2020 0524 by Renzo Bullock RN  Outcome: Ongoing  Goal: Absence of physical injury  Description  Absence of physical injury  1/17/2020 0749 by Jorge Chi RN  Outcome: Ongoing  1/17/2020 0524 by Renzo Bullock RN  Outcome: Ongoing     Problem: Pain:  Goal: Pain level will decrease  Description  Pain level will decrease  1/17/2020 0749 by Jorge Chi RN  Outcome: Ongoing  1/17/2020 0524 by Renzo Bullock RN  Outcome: Ongoing  Goal: Control of acute pain  Description  Control of acute pain  1/17/2020 0749 by Jorge Chi RN  Outcome: Ongoing  1/17/2020 0524 by Renzo Bullock RN  Outcome: Ongoing  Goal: Control of chronic pain  Description  Control of chronic pain  1/17/2020 0749 by Jorge Chi RN  Outcome: Ongoing  1/17/2020 0524 by Renzo Bullock RN  Outcome: Ongoing  Goal: Patient's pain/discomfort is manageable  Description  Patient's pain/discomfort is manageable  1/17/2020 0749 by Jorge Chi RN  Outcome: Ongoing  1/17/2020 0524 by Renzo Bullock RN  Outcome: Ongoing     Problem: Nutrition  Goal: Optimal nutrition therapy  1/17/2020 0749 by Jorge Chi RN  Outcome: Ongoing  1/17/2020 0524 by Renzo Bullock RN  Outcome: Ongoing     Problem: Infection:  Goal: Will remain free from infection  Description  Will remain free from infection  1/17/2020 0749 by Jorge Chi RN  Outcome: Ongoing  1/17/2020 0524 by Renzo Bullock RN  Outcome: Ongoing     Problem: Safety:  Goal: Free from accidental physical injury  Description  Free from accidental physical injury  1/17/2020 0749 by Jorge Chi RN  Outcome: Ongoing  1/17/2020 0524 by Sebastian Trejo RN  Outcome: Ongoing  Goal: Free from intentional harm  Description  Free from intentional harm  1/17/2020 0749 by Cirilo Calvert RN  Outcome: Ongoing  1/17/2020 0524 by Sebastian Trejo RN  Outcome: Ongoing     Problem: Daily Care:  Goal: Daily care needs are met  Description  Daily care needs are met  1/17/2020 0749 by Cirilo Calvert RN  Outcome: Ongoing  1/17/2020 0524 by Sebastian Trejo RN  Outcome: Ongoing     Problem: Skin Integrity:  Goal: Skin integrity will stabilize  Description  Skin integrity will stabilize  1/17/2020 0749 by Cirilo Calvert RN  Outcome: Ongoing  1/17/2020 0524 by Sebastian Trejo RN  Outcome: Ongoing     Problem: Discharge Planning:  Goal: Patients continuum of care needs are met  Description  Patients continuum of care needs are met  1/17/2020 0749 by Cirilo Calvert RN  Outcome: Ongoing  1/17/2020 0524 by Sebastian Trejo RN  Outcome: Ongoing

## 2020-01-17 NOTE — CARE COORDINATION
Pt discharge to [facility] and [employer] available per Mariely at [facility]. Set up with [facility] for 1630. Pt ./RN agreeable, left VM for daughter. Updated Mariely on time and she is also agreeable.

## 2020-01-17 NOTE — DISCHARGE SUMMARY
Derian Clark  Discharge Summary     Patient ID  Fariha Dykes   1940  [id number]          Admit date: 1/13/2020   Discharge date: 1/17/2020      Admitting Physician: Shanda Owusu MD   Discharge Physician: Shanda Owusu MD    Discharge Diagnoses:     1.  Generalized weakness, recurrent falls, ruled out normal pressure  Hydrocephalus. Neurology rec appreciated. MRI normal.   2.  Recent diagnosis of erosive duodenitis. On PPI and Carafate. 3.  Hypertension. BP is stable. 4.  Coronary artery disease. Recent ECHO and stress test normal.   5.  Hyperlipidemia. 6.  Diabetes mellitus. On SSI coverage. 7.  Chronic kidney disease. 8.  History of gastroesophageal reflux disease and hiatal hernia. 9.  DC to SNF for rehab.   10.Depression. Started on Remeron. Discharged Condition: good    Hospital Course: The patient is [facility] 67[facility] with multiple medical problems. The patient presented  to emergency room because of recurrent falls at home. She has  generalized weakness and nausea and vomiting. She was recently admitted  to hospital back in 12/2019. She was seen by cardiologist as well as  gastroenterologist.  The patient had EGD by Dr. Jose Hernandez, which revealed  erosive duodenitis and small hiatal hernia. The patient was started on  proton pump inhibitor as well as Carafate. She also had the CT scan of  the abdomen, which was unremarkable. She had 2D echo and a stress test  and there was no evidence of ischemia. Her ejection fraction was  normal.  The patient was treated symptomatically with intravenous fluid  as well and her mild acute kidney injury did improve. She was also  noted to have urinary tract infection and she was treated with Keflex as  well. Since discharge, she was seen in the office. She was slowly and  gradually improving. Over the last couple of days, her weakness has  worsened and had recurrent falls. She presented to emergency room  today. She was seen and evaluated by Dr. Sara Moser. On the CT scan  of the head, there was a concern for ventricular dilatation, normal  pressure hydrocephalus. Initial lactic acid was elevated to 3.0. Remainder of the other labs looked remarkable. The patient has been  admitted for further evaluation. Pt was seen and evaluated by Neurology and GI. MRI brain was normal.  No evidence of NPH. She was seen by PT/OT. It was recommended for to have further rehab. She is being DC to SNF. Seen by GI, not sure about the etiology of nausea and weight loss. Possible advancing age and depression. Started on Remeron. Pt will is being DC to SNF. Follow up with in office after DC from SNF. Consults:     GI and neurology     Significant Diagnostic Studies:     LABORATORY STUDIES AND X-RAY FINDINGS:  Electrocardiogram reveals normal  sinus rhythm, rate of 87 beats per minute.     Chest x-ray, no acute cardiopulmonary process.     On the CBC, white cell count is 7.8, hemoglobin 10.8, hematocrit 33.4,  platelet count 808,872. Differential count is normal.  Lipase 56. Metabolic panel reveals sodium 133, potassium 4.1, chloride 92, CO2 is  21, BUN 15, creatinine 2.1, glucose 137, calcium 8.5, bilirubin 0.6, ALT  24, AST 49.     Lactic acid 3.0. Troponin less than 0.010. Rapid influenza A and B  antigens were negative.     CT scan of the head, no acute intracranial hemorrhage. Ventricular  dilatation appears slightly out of proportion to the degree of the sulci  atrophy and communicating hydrocephalus could be considered.     Repeat lactic acid is 1.8.     Ct Head Wo Contrast    Result Date: 1/13/2020  EXAMINATION: CT OF THE HEAD WITHOUT CONTRAST  1/13/2020 8:57 pm TECHNIQUE: CT of the head was performed without the administration of intravenous contrast. Dose modulation, iterative reconstruction, and/or weight based adjustment of the mA/kV was utilized to reduce the radiation dose to as low as reasonably urinary bladder is partially distended without contour abnormality. No adnexal mass. Peritoneum/Retroperitoneum: Moderate to severe atherosclerotic plaque. No lymphadenopathy. Bones/Soft Tissues: The bones are osteopenic. No acute or aggressive osseous lesions. 1. No acute abdominopelvic findings. 2. Diverticulosis. 3. Small recurrent hiatal hernia, new since the comparison study. 4. Coronary artery disease and atherosclerosis. 5. Osteopenia. Xr Chest Portable    Result Date: 1/13/2020  EXAMINATION: ONE XRAY VIEW OF THE CHEST 1/13/2020 4:56 pm COMPARISON: Chest radiograph 11/10/2018. HISTORY: ORDERING SYSTEM PROVIDED HISTORY: cough, fatigue TECHNOLOGIST PROVIDED HISTORY: Reason for exam:->cough, fatigue Reason for Exam: cough, fatigue Initial encounter. FINDINGS: Status post median sternotomy. The cardiomediastinal silhouette is within normal limits. No pneumothorax, vascular congestion, consolidation, or pleural effusion is identified. No acute osseous abnormality. No acute process. Mri Brain Wo Contrast    Result Date: 1/14/2020  EXAMINATION: MRI OF THE BRAIN WITHOUT CONTRAST  1/14/2020 4:30 pm TECHNIQUE: Multiplanar multisequence MRI of the brain was performed without the administration of intravenous contrast. COMPARISON: CT head 01/13/2020 HISTORY: ORDERING SYSTEM PROVIDED HISTORY: Falls, ataxia, R/O NPH, TECHNOLOGIST PROVIDED HISTORY: Reason for exam:->Falls, ataxia, R/O NPH, FINDINGS: INTRACRANIAL STRUCTURES/VENTRICLES: There is no acute infarct. No mass effect or midline shift. No evidence of an acute intracranial hemorrhage. Moderate generalized involutional change with prominence of ventricles and sulci. Moderate periventricular and subcortical T2 prolongation suggestive of chronic microvascular disease. Patchy T2 prolongation within the pete midbrain suggestive chronic microvascular disease. Chronic lacune infarct identified right caudate head.   Punctate focus of gliosis identified right cerebral hemisphere caudally. .  The sellar/suprasellar regions appear unremarkable. The normal signal voids within the major intracranial vessels appear maintained. ORBITS: The visualized portion of the orbits demonstrate no acute abnormality. SINUSES: The visualized paranasal sinuses and mastoid air cells are well aerated. BONES/SOFT TISSUES: The bone marrow signal intensity appears normal. The soft tissues demonstrate no acute abnormality. Moderate generalized involutional changes and chronic small vessel ischemic disease. Negative acute stroke, midline shift or mass effect. Remote lacunar infarct right caudate head. Nm Myocardial Spect Rest Exercise Or Rx    Result Date: 12/26/2019  Cardiac Perfusion Imaging   Demographics   Patient Name      Missy Isaacs       Date of study        12/26/2019   Date of Birth     1940         Gender               Female   Age               67 year(s)         Race                 Black   Patient Number    [id number]         Room Number          4024   Visit Number      [id number]          Height               64 inches   Corporate ID      [id number]           Weight               225 pounds   Accession Number  [id number]                                        NM Technologist      Jessica Thapa [facility]   Ordering          Ирина Gracia Interpreting         Ирина Gracia  Physician         Pawel Ortiz MD           Cardiologist         Pawel Ortiz MD   Conclusions   Summary  ECG portion of stress test is negative for ischemia by diagnostic criteria. No infarct or ischemia noted. Decreased uptake inferiorly due to diaphragmatic artifact. Normal EF 70 % with normal ventricular contractility. Recommendation  Medical management.    Signatures   ------------------------------------------------------------------  Electronically signed by Giulia Sandoval MD  (Interpreting cardiologist) on 12/26/2019 at 11:44 ------------------------------------------------------------------  Procedure Procedure Type:   Nuclear Stress Test:Pharmacological, Myocardial Perfusion Imaging with  Pharm, NM MYOCARDIAL SPECT REST EXERCISE OR RX  Indications: Chest pain. Risk Factors   The patient risk factors include:prior CABG;obesity, former tobacco use,  hypercholesterolemia, hypertension and diabetes mellitus. Stress Protocols   Resting HR:67 bpm  Resting BP:96/71 mmHg  Stress Protocol:Pharmacologic - Lexiscan  Peak HR:86 bpm                                HR/BP product:8256  Peak BP:96/71 mmHg  Predicted HR: 142 bpm  % of predicted HR: 61   Exercise duration: 01:00 min  Reason for termination:Completed   ECG Findings  Normal sinus rhythm. Arrhythmias  No rhythm abnormality. Symptoms  No symptoms with Lexiscan infusion. Stress Interpretation  ECG portion of stress test is negative for ischemia by diagnostic criteria. Procedure Medications   - Lexiscan I.V. bolus (over 15sec.) 0.4 mg admininstered @ 12/26/2019 10:10. Imaging Protocols   Rest                             Stress   Isotope:Sestamibi 99mTc          Isotope: Sestamibi 99mTc  Isotope dose:10.5 mCi            Isotope dose:32.2 mCi  Administration route: I.V. Administration route: I.V. Injection Date:12/26/2019 09:00  Injection Date:12/26/2019 10:10  Scan Date:12/26/2019 09:45       Scan Date:12/26/2019 10:55   Technique:        SPECT          Technique:        Gated                                                     SPECT   Procedure Description   Upon patient arrival, the patient is identified using two identifiers and  the physician order is verified. An IV is established and 8-11mCi of 99mTc  Sestamibi is intravenously injected and followed with 10mL 0.9% Normal  Saline flush. A circulation period of 45 minutes occurs prior to resting  SPECT imaging. After imaging is complete the patient is escorted to the  stress lab.  The patient is connected to the ECG and blood pressure is  measured. The RN starts the stress portion of the exam and rapidly  intravenously injects Lexiscan (regadenosine) 0.4mg over a period of 10 to15  seconds and follows with 5mL 0.9% Normal Saline flush. Immediately following  the Nuclear Technologist intravenously injects 22-33mCi of 99mTc Sestamibi  and 5mL 0.9% Normal Saline flush. After completion, recovery, and removal of  the IV, the patient rests during the second circulation period of 45  minutes. Final stress SPECT gated imaging is performed. The patient may  return home or to their room after stress imaging. The images are processed  and final charting is completed and sent to the appropriate cardiologist for  interpretation and reporting. Perfusion Interpretation   No infarct or ischemia noted. Decreased uptake inferiorly due to diaphragmatic artifact. Normal EF 70 % with normal ventricular contractility.   Imaging Results    Summed scores     - Summed stress score: 0     - Summed rest score: 0     - Summed difference score:    0   Rest ejection  Ejection fraction:83 %  EDV :53 ml  ESV :9 ml  Stroke volume :44 ml  Medical History   Accession#:  [id number]  Admission Data Admission date: 12/24/2019 Admission Time: 23:37 Hospital Status: Inpatient.  -    Recent Results (from the past 24 hour(s))   POCT Glucose    Collection Time: 01/16/20  5:31 PM   Result Value Ref Range    POC Glucose 100 (H) 70 - 99 MG/DL   POCT Glucose    Collection Time: 01/16/20  8:38 PM   Result Value Ref Range    POC Glucose 85 70 - 99 MG/DL   POCT Glucose    Collection Time: 01/17/20  2:25 AM   Result Value Ref Range    POC Glucose 96 70 - 99 MG/DL   CBC Auto Differential    Collection Time: 01/17/20  6:05 AM   Result Value Ref Range    WBC 8.5 4.0 - 10.5 K/CU MM    RBC 3.22 (L) 4.2 - 5.4 M/CU MM    Hemoglobin 9.7 (L) 12.5 - 16.0 GM/DL    Hematocrit 30.2 (L) 37 - 47 %    MCV 93.8 78 - 100 FL    MCH 30.1 27 - 31 PG    MCHC 32.1 32.0 - 36.0 %    RDW 17.3 (H) 11.7 - 14.9 %    Platelets 126 946 - 286 K/CU MM    MPV 10.3 7.5 - 11.1 FL    Differential Type AUTOMATED DIFFERENTIAL     Segs Relative 52.8 36 - 66 %    Lymphocytes % 33.6 24 - 44 %    Monocytes % 10.2 (H) 0 - 4 %    Eosinophils % 2.3 0 - 3 %    Basophils % 0.5 0 - 1 %    Segs Absolute 4.5 K/CU MM    Lymphocytes Absolute 2.9 K/CU MM    Monocytes Absolute 0.9 K/CU MM    Eosinophils Absolute 0.2 K/CU MM    Basophils Absolute 0.0 K/CU MM    Nucleated RBC % 0.0 %    Total Nucleated RBC 0.0 K/CU MM    Total Immature Neutrophil 0.05 K/CU MM    Immature Neutrophil % 0.6 (H) 0 - 0.43 %   Comprehensive Metabolic Panel    Collection Time: 01/17/20  6:05 AM   Result Value Ref Range    Sodium 133 (L) 135 - 145 MMOL/L    Potassium 3.4 (L) 3.5 - 5.1 MMOL/L    Chloride 96 (L) 99 - 110 mMol/L    CO2 19 (L) 21 - 32 MMOL/L    BUN 22 6 - 23 MG/DL    CREATININE 1.9 (H) 0.6 - 1.1 MG/DL    Glucose 87 70 - 99 MG/DL    Calcium 8.0 (L) 8.3 - 10.6 MG/DL    Alb 3.7 3.4 - 5.0 GM/DL    Total Protein 6.8 6.4 - 8.2 GM/DL    Total Bilirubin 0.8 0.0 - 1.0 MG/DL    ALT 27 10 - 40 U/L    AST 52 (H) 15 - 37 IU/L    Alkaline Phosphatase 47 40 - 128 IU/L    GFR Non- 26 (L) >60 mL/min/1.73m2    GFR  31 (L) >60 mL/min/1.73m2    Anion Gap 18 (H) 4 - 16   Amylase    Collection Time: 01/17/20  6:05 AM   Result Value Ref Range    Amylase 75 25 - 115 U/L   Lipase    Collection Time: 01/17/20  6:05 AM   Result Value Ref Range    Lipase 70 (H) 13 - 60 IU/L   POCT Glucose    Collection Time: 01/17/20  7:46 AM   Result Value Ref Range    POC Glucose 86 70 - 99 MG/DL        Disposition: [facility]    Patient Instructions:    [address] Medication Instructions MARVA:[id number]    Printed on:01/17/20 1240   Medication Information                      atorvastatin (LIPITOR) 80 MG tablet  TAKE 1 TABLET BY MOUTH ONE TIME A DAY              insulin lispro (HUMALOG) 100 UNIT/ML injection vial  Inject 0-6 Units into the skin 3 times daily (with meals)             insulin lispro (HUMALOG) 100 UNIT/ML injection vial  Inject 0-3 Units into the skin nightly             lisinopril (PRINIVIL;ZESTRIL) 2.5 MG tablet  TAKE 1 TABLET BY MOUTH ONE TIME A DAY              metoprolol (LOPRESSOR) 100 MG tablet  Take 100 mg by mouth 2 times daily. mirtazapine (REMERON SOL-TAB) 15 MG disintegrating tablet  Take 1 tablet by mouth nightly             ondansetron (ZOFRAN ODT) 4 MG disintegrating tablet  Take 1 tablet by mouth every 8 hours as needed for Nausea             pantoprazole (PROTONIX) 40 MG tablet  Take 1 tablet by mouth every morning (before breakfast)             potassium chloride (KLOR-CON M) 10 MEQ extended release tablet  Take 1 tablet by mouth daily             sucralfate (CARAFATE) 1 GM tablet  Take 1 g by mouth 4 times daily. triamterene-hydrochlorothiazide (MAXZIDE-25) 37.5-25 MG per tablet  TAKE 1 TABLET BY MOUTH ONE TIME A DAY                   Diet: DIET CARB CONTROL;   Dietary Nutrition Supplements: Clear Liquid Oral Supplement    Follow-up with Fay Myers in 3 days    Signed: Fay Myers    Time spent on discharge 35 minutes

## 2020-01-17 NOTE — PROGRESS NOTES
belt was used for func transfers / mobility. Assessment / Impression:  As per OT evaluation, pt would benefit from continued therapeutic intervention in SNF. Patient's tolerance of treatment:  Well   Adverse Reaction: None  Significant change in status and impact:  None  Barriers to improvement: Decreased strength and endurance. Plan for Next Session:    Continue per OT POC.      Time in:  1310  Time out:  1333  Timed treatment minutes:  23  Total treatment time:  23    Electronically signed by:    Magnolia Hancock,   1/17/2020, 1:21 PM    Previously filed values:    Goals:  Pt goal: go home  Time Frame for STGs: discharge  Goal 1: Pt will perform UE ADLs Independent  Goal 2: Pt will perform LE ADLs Independent  Goal 3: Pt will perform toileting Independent  Goal 4: Pt will perform functional transfer w/ AD Bryan  Goal 5: Pt will perform functional mobility w/ AD Bryan  Goal 6: Pt will perform therex/theract in order to increase functional activity tolerance and dynamic standing balance

## 2020-01-17 NOTE — PROGRESS NOTES
Physical Therapy  . Physical Therapy Treatment Note  Name: Amairs Yoder MRN: [id number] :   1940   Date:  2020   Admission Date: 2020 Room:  [address] [place] [address]       Restrictions/Precautions:        General Precautions, Fall Risk     Communication with other providers:  NELIDA aGlvan cleared patient for physical therapy. CoTx with Mary Joyce. Subjective:  Patient states:  Patient reports she had been sitting up in the chair for awhile and would like to go back to bed. Pain:   Location, Type, Intensity (0/10 to 10/10): Denies pain. Objective:    Observation:  Patient reclined in chair upon therapist arrival. Fantasma Chen A&O x4  Vitals: HR 74, RR22    Treatment, including education/measures:  Transfers  Supine to sit :Patient received OOB  Sit to supine :independent   Scooting :independent   Rolling :NT  Sit to stand :from chair moderate assistance x2 on first attempt with incomplete stand; minimal assistance x2 for second and successful attempt   Patient requires maximal verbal / tactile instructions for correct foot placement. Stand to sit :CGA x2  SPT:minimal assistance x2 progressing to CGA with RW taking 3-4 steps from chair to the bed     Therapeutic Exercise:  Therapeutic exercises were instructed today. Instructions were given for technique, safety, recruitment, and rationale. Instructions were verbal and/or tactile. Supine: Ankle pumps 1 sets of 10  Heel slides 1 sets of 10  Hip abduction / adduction 1 sets of 10      Safety  Patient left safely in the bed, with call light/phone in reach with alarm applied. Fall mat in place. Gait belt was used for transfers and gait.       Assessment / Impression:    Patient's tolerance of treatment:  well   Adverse Reaction: none  Significant change in status and impact:  none  Barriers to improvement: decreased strength and endurance  Discharge plan: pending     Plan for Next Session:    Per POC    Time in:  1310  Time out:  1333  Timed treatment minutes:  23  Total treatment time:  23    Previously filed items:  Social/Functional History  Lives With: Daughter(+ son-in-law.  Someone is home most of the time with pt)  Type of Home: Apartment  Home Layout: One level  Home Access: Level entry  Bathroom Shower/Tub: Tub/Shower unit  Bathroom Toilet: Standard  Bathroom Equipment: Shower chair  Home Equipment: Cane, Rolling walker  ADL Assistance: [address]: Needs assistance(helps dtr when she can)  Ambulation Assistance: Independent(typically uses a cane)  Transfer Assistance: Independent  Active : No  Patient's  Info: dtr   Additional Comments: recent fall from tripping in the bathroom   Short term goals  Time Frame for Short term goals: 1 week   Short term goal 1: Pt will perform sit><supine SBA   Short term goal 2: Pt will transfer to all surfaces SBA   Short term goal 3: Pt will ambulate 150ft with RW SBA   Short term goal 4: Pt will perform standing dynamic activity x 5 minutes SBA        Electronically signed by:    Ranjan Bee PTA [id number]

## 2020-01-17 NOTE — PROGRESS NOTES
Patient alert and oriented. Voiced minor leg pain. PRN pain medication given per order. Results effective. Patient refused evening ADL's. Stated' \"I'm dry and don't need checked. \" Hs blood glucose reading 85,  2 am reading 94. Will continue to monitor patient.

## 2020-02-11 PROBLEM — R53.1 GENERALIZED WEAKNESS: Status: ACTIVE | Noted: 2020-01-01

## 2020-02-11 PROBLEM — R77.8 ELEVATED TROPONIN: Status: ACTIVE | Noted: 2020-01-01

## 2020-02-11 NOTE — ED NOTES
Bed: ED-32  Expected date:   Expected time:   Means of arrival:   Comments:  EMS- poss dehydration       Yusra Harris.  Tine, NELIDA  02/11/20 [id number]

## 2020-02-11 NOTE — ED TRIAGE NOTES
Patient brought to the ED by EMS with reports of decreased intake family now at bedside. Family states patient is not eating or taking in fluids and having a decline in movement. Patient denies any pain or shortness of breath. Family states she was \"warm\" the past two days. Patient has also been \"keeping her hands in fist position\" which is new. Patient hypertensive, did not take blood pressure medications this AM. No distress noted vital signs stable.

## 2020-02-11 NOTE — ED PROVIDER NOTES
chloride infusion   Intravenous Continuous Tash Ferguson MD 75 mL/hr at 02/12/20 1201 75 mL/hr at 02/12/20 1201    sodium chloride flush 0.9 % injection 10 mL  10 mL Intravenous 2 times per day Tash Ferguson MD   10 mL at 02/12/20 2104    sodium chloride flush 0.9 % injection 10 mL  10 mL Intravenous PRN Tash Ferguson MD        magnesium hydroxide (MILK OF MAGNESIA) 400 MG/5ML suspension 30 mL  30 mL Oral Daily PRN Tash Ferguson MD        [facility] injection 4 mg  4 mg Intravenous Q6H PRN Tash Ferguson MD        enoxaparin (LOVENOX) injection 30 mg  30 mg Subcutaneous Daily Tash Ferguson MD   30 mg at 02/12/20 0931     No Known Allergies    Nursing Notes Reviewed     Physical Exam:   ED Triage Vitals [02/11/20 1208]   Enc Vitals Group      BP (!) 135/110      Pulse 80      Resp 18      Temp 97.8 °F (36.6 °C)      Temp Source Oral      SpO2 97 %      Weight       Height       Head Circumference       Peak Flow       Pain Score       Pain Loc       Pain Edu? Excl. in [address]? /63   Pulse 73   Temp 97.9 °F (36.6 °C) (Oral)   Resp 16   Ht 5' 4\" (1.626 m)   Wt 202 lb 8 oz (91.9 kg)   SpO2 97%   BMI 34.76 kg/m²   My pulse ox interpretation is - normal  Physical Exam  Vitals signs and nursing note reviewed. Constitutional:       General: She is not in acute distress. Appearance: Normal appearance. She is not toxic-appearing or diaphoretic. HENT:      Head: Normocephalic and atraumatic. Eyes:      General:         Right eye: No discharge. Left eye: No discharge. Conjunctiva/sclera: Conjunctivae normal.   Cardiovascular:      Rate and Rhythm: Normal rate and regular rhythm. Pulses: Normal pulses. Radial pulses are 2+ on the right side and 2+ on the left side. Pulmonary:      Effort: Pulmonary effort is normal. No respiratory distress. Breath sounds: No wheezing or rales. Abdominal:      General: There is no distension. Tenderness:  There is no abdominal tenderness. There is no guarding or rebound. Musculoskeletal: Normal range of motion. General: No swelling or tenderness. Skin:     General: Skin is warm and dry. Neurological:      General: No focal deficit present. Mental Status: She is alert. Cranial Nerves: No cranial nerve deficit or facial asymmetry. Motor: Weakness (3/5 strength in all extremities) present. Coordination: Coordination abnormal. Finger-Nose-Finger Test abnormal.      Comments: Normal range of motion of her wrist but is holding them in flexion at almost all times even when trying to  or grab things.    Psychiatric:         Mood and Affect: Mood normal.         Behavior: Behavior normal.         I have reviewed and interpreted all of the currently available lab results from this visit (if applicable):  Results for orders placed or performed during the hospital encounter of 02/11/20   CBC Auto Differential   Result Value Ref Range    WBC 7.8 4.0 - 10.5 K/CU MM    RBC 3.16 (L) 4.2 - 5.4 M/CU MM    Hemoglobin 9.8 (L) 12.5 - 16.0 GM/DL    Hematocrit 30.4 (L) 37 - 47 %    MCV 96.2 78 - 100 FL    MCH 31.0 27 - 31 PG    MCHC 32.2 32.0 - 36.0 %    RDW 16.8 (H) 11.7 - 14.9 %    Platelets 211 451 - 416 K/CU MM    MPV 11.1 7.5 - 11.1 FL    Differential Type AUTOMATED DIFFERENTIAL     Segs Relative 61.6 36 - 66 %    Lymphocytes % 24.2 24 - 44 %    Monocytes % 10.4 (H) 0 - 4 %    Eosinophils % 2.8 0 - 3 %    Basophils % 0.5 0 - 1 %    Segs Absolute 4.8 K/CU MM    Lymphocytes Absolute 1.9 K/CU MM    Monocytes Absolute 0.8 K/CU MM    Eosinophils Absolute 0.2 K/CU MM    Basophils Absolute 0.0 K/CU MM    Nucleated RBC % 0.0 %    Total Nucleated RBC 0.0 K/CU MM    Total Immature Neutrophil 0.04 K/CU MM    Immature Neutrophil % 0.5 (H) 0 - 0.43 %   Comprehensive Metabolic Panel w/ Reflex to MG   Result Value Ref Range    Sodium 133 (L) 135 - 145 MMOL/L    Potassium 4.1 3.5 - 5.1 MMOL/L    Chloride 95 (L) 99 - 110 mMol/L    CO2 21 21 - 32 MMOL/L    BUN 27 (H) 6 - 23 MG/DL    CREATININE 2.1 (H) 0.6 - 1.1 MG/DL    Glucose 112 (H) 70 - 99 MG/DL    Calcium 9.3 8.3 - 10.6 MG/DL    Alb 3.9 3.4 - 5.0 GM/DL    Total Protein 7.6 6.4 - 8.2 GM/DL    Total Bilirubin 0.7 0.0 - 1.0 MG/DL    ALT 24 10 - 40 U/L    AST 37 15 - 37 IU/L    Alkaline Phosphatase 125 40 - 129 IU/L    GFR Non- 23 (L) >60 mL/min/1.73m2    GFR  28 (L) >60 mL/min/1.73m2    Anion Gap 17 (H) 4 - 16   TSH without Reflex   Result Value Ref Range    TSH, High Sensitivity 0.911 0.270 - 4.20 uIu/ml   Urinalysis, reflex to microscopic   Result Value Ref Range    Color, UA YELLOW YELLOW    Clarity, UA SLIGHTLY CLOUDY (A) CLEAR    Glucose, Urine NEGATIVE NEGATIVE MG/DL    Bilirubin Urine NEGATIVE NEGATIVE MG/DL    Ketones, Urine NEGATIVE NEGATIVE MG/DL    Specific Gravity, UA 1.011 1.001 - 1.035    Blood, Urine SMALL (A) NEGATIVE    pH, Urine 5.0 5.0 - 8.0    Protein, UA NEGATIVE NEGATIVE MG/DL    Urobilinogen, Urine NORMAL 0.2 - 1.0 MG/DL    Nitrite Urine, Quantitative NEGATIVE NEGATIVE    Leukocyte Esterase, Urine LARGE (A) NEGATIVE    RBC, UA 8 (H) 0 - 6 /HPF    WBC,  (H) 0 - 5 /HPF    Bacteria, UA NEGATIVE NEGATIVE /HPF    WBC Clumps, UA RARE /HPF    Yeast, UA MODERATE /HPF    Squam Epithel, UA 10 /HPF    Renal Epithelial, Urine 2 /HPF    Mucus, UA RARE (A) NEGATIVE HPF    Trichomonas, UA NONE SEEN NONE SEEN /HPF    Hyaline Casts, UA 2 /LPF    Unclassified Crystal OCCASIONAL /HPF   Troponin   Result Value Ref Range    Troponin T 0.039 (H) <0.01 NG/ML   Ammonia Level   Result Value Ref Range    Ammonia 47 11 - 51 UMOL/L   CK   Result Value Ref Range    Total  26 - 140 IU/L   CBC auto differential   Result Value Ref Range    WBC 7.7 4.0 - 10.5 K/CU MM    RBC 3.00 (L) 4.2 - 5.4 M/CU MM    Hemoglobin 9.4 (L) 12.5 - 16.0 GM/DL    Hematocrit 32.1 (L) 37 - 47 %    .0 (H) 78 - 100 FL    MCH 31.3 (H) 27 - 31 PG    MCHC 29.3 (L) 32.0

## 2020-02-11 NOTE — CARE COORDINATION
CM review of pt chart for readmission risk, last admission 1/13-17/20, generalized weakness, pt discharged to [facility] was discharged last week, pt has [address], pt stated 20 days 100% covered by insurance. Pt was discharged home with [facility], daily RN visit, speech therapy, PT/OT, and a (who has not been out yet). Family states pt was ambulating with walker prior to last admission, but has not been ambulatory since. This information came from family at bedside:dtr/ Ilya Blanco [phone], pt lives with other dtr/ Nathaniel Beach [phone], another dtr at bed side, did not get her name. Plan at discharge is to return home with dtr/Meghan and [facility]. This CM did discuss returning to rehab with co-pay as per [facility] from day . Family is aware of the co-pay for days  days. Pt has not engaged in conversation she is resting. Discussed LTC and advised talking with facility she may go to for LTC if needed, they said they would do that but they are not interested in LTC at [facility], stated they would discuss with Patrick Noyola . Pt has no Medical POA established, paperwork given if when pt is feeling better she may want to complete Medical POC paperwork, explained it most be notarized in presence of pt. Family/dtrs verbalized understanding. Family had no other questions or concerns at this time, explained CM available on eache for for further questions or concerns for discharge planning. CM to follow up discharge plan.  BRUCE,RN/CM

## 2020-02-12 PROBLEM — E44.0 MODERATE MALNUTRITION (HCC): Chronic | Status: RESOLVED | Noted: 2020-01-01 | Resolved: 2020-01-01

## 2020-02-12 PROBLEM — E43 SEVERE MALNUTRITION (HCC): Chronic | Status: ACTIVE | Noted: 2020-01-01

## 2020-02-12 NOTE — PROGRESS NOTES
Patient addmitted to room 4113, some excoriation noted to coccyx. Patient alert and oriented X4, denies any needs at this time, will cont to monitor.

## 2020-02-12 NOTE — PROGRESS NOTES
activity/participation limitations):  · Observation:  Supine in bed upon arrival, agreeable to therapy  · Vision:  [facility]  · Hearing:  [facility]  · Cardiopulmonary:  No 02 needs      Body Systems and functions:  · ROM R/L:  ~75 degrees shoulder flexion, distal WFL    · Strength R/L:  3/5,   · Sensation: Greatly increased nueropathy in fingers/toes  · Tone: Hypotonic  · Coordination: Greatly decreased gross/fine motor coordination. · Perception: Decreased initiation/sequencing    Activities of Daily Living (ADLs):  · Feeding: Setup/modA  · Grooming: modA  · UB bathing: modA  · LB bathing: maxA  · UB dressing: modA  · LB dressing: dependent/total (donning socks sitting EOB)  · Toileting: dependent/total    Cognitive and Psychosocial Functioning:  · Overall cognitive status: WFL  · Affect: Normal     Mobility:  · Supine to sit:  modA  · Transfers: maxA  X 2 stand pivot from EOB to reclining chair  · Sitting balance:  CGA ~5 minutes. · Standing balance:  maxA x 2.  · Toilet/Shower Transfers: DNT             [facility] Daily Activity Inpatient   How much help for putting on and taking off regular lower body clothing?: Total  How much help for Bathing?: Total  How much help for Toileting?: Total  How much help for putting on and taking off regular upper body clothing?: A Lot  How much help for taking care of personal grooming?: A Lot  How much help for eating meals?: A Lot  [facility] Inpatient Daily Activity Raw Score: 9  AM-PAC Inpatient ADL T-Scale Score : 25.33  ADL Inpatient CMS 0-100% Score: 79.59  ADL Inpatient CMS G-Code Modifier : CL    Treatment:  Self Care Training:   Cues were given for safety, sequence, UE/LE placement, visual cues, and balance. Activities performed today included LB dressing    Therapeutic Activity Training:   Therapeutic activity training was instructed today. Cues were given for safety, sequence, UE/LE placement, awareness, and balance.     Activities performed today included bed mobility training,

## 2020-02-12 NOTE — PROGRESS NOTES
Nutrition Assessment    Type and Reason for Visit: Initial, Positive Nutrition Screen    Nutrition Recommendations:   · Continue current diet  · Start frozen supplements    Nutrition Assessment: Pt fell put due to a positive screen for weight loss. Pt family believes this is a defiance 2/2 to pt daughter being ill. Pt refuses Ensure but is willing to trial the frozen supplements. Will order and continue to follow    Malnutrition Assessment:  · Malnutrition Status: Meets the criteria for severe malnutrition  · Context: Social or environmental circumstances  · Findings of the 6 clinical characteristics of malnutrition (Minimum of 2 out of 6 clinical characteristics is required to make the diagnosis of moderate or severe Protein Calorie Malnutrition based on AND/ASPEN Guidelines):  1. Energy Intake-Less than or equal to 50% of estimated energy requirement, Greater than or equal to 3 months    2. Weight Loss-10% loss or greater, in 3 months  3. Fat Loss-Severe subcutaneous fat loss, Orbital  4. Muscle Loss-Severe muscle mass loss, Clavicles (pectoralis and deltoids)  5. Fluid Accumulation-No significant fluid accumulation, Extremities  6.   Strength-Not measured    Nutrition Risk Level: High    Nutrient Needs:  · Estimated Daily Total Kcal: 0563-9195 based on MSJ  · Estimated Daily Protein (g): 54-65 based on 1-1.2 g/kg/IBW  · Estimated Daily Total Fluid (ml/day): 5765-8032 based on 1 mL/kcal    Nutrition Diagnosis:   · Problem: Severe malnutrition, In context of social or environmental circumstances  · Etiology: related to Insufficient energy/nutrient consumption     Signs and symptoms:  as evidenced by Severe loss of subcutaneous fat, Severe muscle loss, Weight loss greater than or equal to 7.5% in 3 months    Objective Information:  · Wound Type: None  · Current Nutrition Therapies:  · Oral Diet Orders: Cardiac   · Oral Diet intake: Unable to assess  · Oral Nutrition Supplement (ONS) Orders:

## 2020-02-12 NOTE — CONSULTS
Robby 56, 1940, [id number], 2/12/2020    History  [place]:  The encounter diagnosis was Generalized weakness. Patient  has a past medical history of Acid reflux, Arthritis, Back pain, CAD (coronary artery disease), Diabetes mellitus ([address] Hiatal hernia, Hx of blood clots, Hx of migraines, HX OTHER MEDICAL, Hyperlipidemia, Hypertension, IBS (irritable bowel syndrome), Right Breast Cancer, Shortness of breath on exertion, UTI (urinary tract infection), Wears dentures, and Wears glasses. Patient  has a past surgical history that includes Cardiac surgery (2001); Dilatation, esophagus (\"3 Or 4\" Last Done 7-14); eye surgery (Bilateral, 2000's); Dental surgery; Joy tooth extraction; Appendectomy (Unsure When); Colonoscopy (\"Two\" Last Done 2000's); Cholecystectomy, laparoscopic (1990's Or 2000's); Breast surgery (Right, 2005 Or 2006); Inguinal hernia repair (Right, 1980's Or 1990's); hernia repair (1960's); Tubal ligation (1965); Tonsillectomy (1970's); Gastric fundoplication (8/9/69); hiatal hernia repair (2014); and Upper gastrointestinal endoscopy (N/A, 12/27/2019). Subjective:  Patient states:  Amenable to therapy. Pain:  Initially denies pain, after transfer experienced pain in L leg. Communication with other providers:  Handoff to RN, partial co-eval with OT. Restrictions: Fall risk    Home Setup/Prior level of function  Patient lives at home with daughter, has had 2 previous admissions in last 2 months and after previous admission d/c to SNF, while at SNF daughter reports she was not mobilizing at the SNF and deconditioned and became weak so they took her home. At home she has been dependent for care, has attempted to stand and ambulate but results in falls.       Examination of body systems (includes body structures/functions, activity/participation limitations):  · Observation:  Supine in bed upon arrival   · Vision: WFL  · [facility]  · Cardiopulmonary:  No O2 needs  · Cognition: WFL, see OT/SLP note for further evaluation. Musculoskeletal  · ROM R/L:  WFL. · Strength R/L:  4/5, weakness in function and endurance. · Neuro:  Impaired overall coordination in B UE/LE      Mobility:  · Supine to sit: Mod A  · Transfers: Mod A x2-max A x2  · Sitting balance:  Min A-CGA. · Standing balance: Mod A.    · Gait: NT    [facility] 6 Clicks Inpatient Mobility:  AM-PAC Inpatient Mobility Raw Score : 10    Treatment:  Sup to sit: assist with mod A, assist at LE and trunk, able to pull to sit, difficulty with , cues for coordination of transfer, patient with significant difficulty coordinating UE and LE for transfers. Sitting balance: sitting EOB x10 min intermittently requires min A with dynamic reaching tasks. Addressed UE coordination for washing face, hand over hand to manage washcloth and coordinate. Transfers: STS x5 with mod A x2, x3 partial stand and x2 full stands, attempted with AD, patient unable d/t poor hand/UE coordination. B foot block with B knee block for all stands. Returned to supine: max A x2, patient able to assist but difficulty coordinating, assist to manage LE, trunk, and hips, scooted up in bed. Second session:     Patient with OT at EOB. Assisted with stand pivot transfer, no AD, max A x2, cues for anterior weight shift. During pivoting part of transfer patient with L leg pain, likely twisted ankle or knee during pivot. No bruising or erythema, improved pain over next 5 minutes, patient able to move LE with same ROM, RN notified, educated daughter and patient to vocalize any other changes with pain or swelling noted throughout the day. Safety: patient left in chair with chair alarm, call light within reach, RN notified, gait belt used.     Assessment:  Patient is a 68 yo female who presents with falls and weakness, recently at SNF and has had a significant decline in function

## 2020-02-12 NOTE — PROGRESS NOTES
INTERNAL MEDICINE PROGRESS NOTE        Mary [place]hanok   1940   Primary Care Physician:  Sana Casillas MD  Admit Date: 2/11/2020     Subjective:   Pt is doing better today. Denies chest pain, SOB, nausea, vomiting, abdominal pain. Remainder of ROS is unremarkable. Meds, labs and other notes reviewed. Objective:   BP (!) 141/63   Pulse 71   Temp 98.1 °F (36.7 °C) (Oral)   Resp 16   Ht 5' 4\" (1.626 m)   Wt 202 lb 8 oz (91.9 kg)   SpO2 97%   BMI 34.76 kg/m²  No results for input(s): POCGLU in the last 72 hours. I/O last 3 completed shifts:  In: -   Out: 50 [Urine:50]  No intake/output data recorded. Neck: no adenopathy and supple, symmetrical, trachea midline  Lungs: clear to auscultation bilaterally  Heart: regular rate and rhythm and S1, S2 normal  Abdomen: soft, non-tender; bowel sounds normal; no masses,  no organomegaly  Extremities: extremities normal, atraumatic, no cyanosis or edema  Neurologic: Grossly normal    Data Review  CBC with Differential:    Recent Labs     02/11/20  1412 02/12/20  0648   WBC 7.8 7.7   RBC 3.16* 3.00*   HGB 9.8* 9.4*   HCT 30.4* 32.1*    199   MCV 96.2 107.0*   MCH 31.0 31.3*   MCHC 32.2 29.3*   RDW 16.8* 17.2*   SEGSPCT 61.6 49.6   LYMPHOPCT 24.2 31.3   MONOPCT 10.4* 12.7*   BASOPCT 0.5 0.6   MONOSABS 0.8 1.0   LYMPHSABS 1.9 2.4   EOSABS 0.2 0.4   BASOSABS 0.0 0.1   DIFFTYPE AUTOMATED DIFFERENTIAL AUTOMATED DIFFERENTIAL     CMP:    Recent Labs     02/11/20  1412   *   K 4.1   CL 95*   CO2 21   BUN 27*   CREATININE 2.1*   GFRAA 28*   LABGLOM 23*   GLUCOSE 112*   PROT 7.6   LABALBU 3.9   CALCIUM 9.3   BILITOT 0.7   ALKPHOS 125   AST 37   ALT 24     PT/INR:  No results for input(s): PROTIME, INR in the last 72 hours.   Meds:    metoprolol  100 mg Oral BID    pantoprazole  40 mg Oral QAM AC    sodium chloride flush  10 mL Intravenous 2 times per day    enoxaparin  30 mg Subcutaneous Daily     PRN Meds: ondansetron, sodium chloride flush, magnesium hydroxide, ondansetron    Assessment/Plan:   1. Generalized weakness and recurrent falls. Unclear etiology. Consult Neurology to R/O neuromuscular disorder. 2.  Hypertension. 3.  Coronary artery disease with previous bypass surgery. 4.  Hyperlipidemia. 5.  Chronic kidney disease, stage 3.  6.  Diabetes mellitus. 7.  Gastroesophageal reflux disease and hiatal hernia. 8.  History of right breast cancer status post mastectomy.        Audrey Longoria MD  2/12/2020 8:00 AM

## 2020-02-12 NOTE — H&P
[address], [facility]                              HISTORY AND PHYSICAL    PATIENT NAME: Mejia Edwards                       :        1940  MED REC NO:   [id number]                          ROOM:       4672  ACCOUNT NO:   [de-identified]                           ADMIT DATE: 2020  PROVIDER:     Shanda Owusu MD    CHIEF COMPLAINT:  Generalized weakness and falls. HISTORY OF PRESENT ILLNESS:  The patient is a 61-year-old [facility]  American female with multiple medical problems. The patient was  recently admitted to [facility] initially in  2019 and then on 2020. Her main issue seems to be generalized  weakness, poor appetite and frequent falls. In 2019, the patient was  seen and evaluated by cardiologist as well as gastroenterologist.  Her  2D echo and stress test was normal.  Dr. Jose Hernandez performed an upper  endoscopy which revealed gastritis and duodenitis and she was started on  proton pump inhibitor and Carafate. The patient was readmitted in  2020 and at this time she was seen by the neurologist.  The patient  had a CT scan of the head as well as MRI of the brain. There was no  acute stroke. There was no evidence of normal pressure hydrocephalus. Neurologist recommended continuing with physical therapy and  occupational therapy. She was also seen by Dr. Lobo Manning from  Gastroenterology in 2020 and there were no further recommendations at  this point. Dr. Lobo Manning had recommended that if the patient improves  significantly, then the patient will need outpatient colonoscopy. The  patient was discharged to a skilled nursing facility on 2020 where  she had physical therapy. She was discharged home about a week ago and  once again her condition slowly and gradually deteriorated and she was  brought to the emergency room.   In the emergency room, the patient was  seen and evaluated by Dr. Kyler Stafford as well as Dr. Ines Jeong. Overall laboratory workup is unremarkable. Hemoglobin is stable at 9.8. Creatinine is 2.1. Troponin T 0.039. Chest x-ray shows no pneumonia. CT scan of the head, no acute intracranial abnormality. EKG shows  normal sinus rhythm. The patient has been admitted for further  evaluation with physical therapy and occupational therapy. It also  appears that she will need assist in the discharge plan back to the  nursing home on a long-term basis. ALLERGIES:  No known drug allergies. MEDICATIONS:  Prior to admission, Lopid 600 mg twice a day, metformin  500 mg twice a day, vitamin B12 tablet daily, Remeron 15 mg, Humalog  insulin, potassium 10 mEq, Protonix 40 mg, Maxzide one tablet, Zofran as  needed, Lipitor 80 mg, Prinivil 2.5 mg, Carafate 1 gm four times a day,  metoprolol 100 mg b.i.d. PAST MEDICAL AND SURGICAL HISTORY:  Hypertension, hyperlipidemia,  diabetes mellitus, chronic kidney disease, obesity, gastroesophageal  reflux disease, hiatal hernia, irritable bowel syndrome, right breast  cancer, chronic back pain, DVT. The patient's surgeries include wisdom  tooth extraction, tonsillectomy, hiatal hernia repair, cataract surgery,  esophageal dilatation, cholecystectomy, coronary artery bypass graft  surgery in 2001, right mastectomy for breast cancer as well as she had  appendectomy and hiatal hernia repair. SOCIAL HISTORY:  Negative for smoking, alcohol use or drug use. She  quit smoking in 1978. FAMILY HISTORY:  Mother had hypertension, coronary artery disease,  abdominal aortic aneurysm. Father had coronary artery disease. PHYSICAL EXAMINATION:  VITAL SIGNS:  Temperature 97.8 degrees Fahrenheit, blood pressure  135/110, pulse 80, respiratory rate 18, oxygen saturation 97%. Repeat  blood pressure was 139/65. The patient's weight is 202 pounds. HEENT:  Conjunctivae normal.  Sclerae nonicteric.   Oropharynx is normal  limits. She also had the MRI of the brain, no evidence of normal  pressure hydrocephalus or the stroke. She also had an EGD by Dr. Colleen Ortiz and she was noted to have erosive duodenitis and small hiatal  hernia, and workup so far has been pretty much unremarkable. The  patient will be reevaluated and further recommendations to follow.         Jared Martinez MD    D: 02/11/2020 21:24:36       T: 02/11/2020 21:30:25     [email or web address]  Job#: [id number]     Doc#: [id number]    CC:  Bianca Nieves MD

## 2020-02-12 NOTE — CONSULTS
Neurology Service Consult Note  [facility]   Patient Name: Gaylon Cabot  : 1940        Subjective:   Reason for consult: weakness   67 y.o. right-handed female presenting to [facility] with complaints of generalized weakness, patient is well known to our service as we have treated her in the past, patient has been struggling with severe nausea and vomiting without clear etiology and has had extensive GI work up, due to this she is having eating, drinking, presents back with same complaints and daughter states now she is severely weak, cannot walk and her hands according to the daughter appear to be contracted and deformed, this is a new finding since  exam.     Patient last time we saw her identified a severe neuropathy and she had EMGs with Dr. Yandy Alcocer on an outpatient basis. She has no unilateral arm or leg weakness  There is no report of difficulty swallowing  Daughter states she is suspicious patient is having diplopia because she over shoots things with her hands when reaching or touching, but patient denies. Voice has not changed.    There is no facial droop, aphasia or dysarthria     Patient denies CP and SOB  No fever neck or back pain         Past Medical History:   Diagnosis Date    Acid reflux     Arthritis     \"Back\"    Back pain     \"Back Hurts Sometimes\"    CAD (coronary artery disease)     Sees Dr. Ish Connelly Diabetes mellitus [facility]) Dx 's    \"Borderline\"    Hiatal hernia     Hx of blood clots     \"Blood Clots Legs After Heart Surgery\"    Hx of migraines     HX OTHER MEDICAL     Primary Care Physician Is Dr. Tona Kendall Hyperlipidemia     Hypertension     IBS (irritable bowel syndrome)     Right Breast Cancer  Or 2006     Right Breast Mastectomy    Shortness of breath on exertion     UTI (urinary tract infection) In Past    No Current Symptoms    Wears dentures     Full Upper    Wears glasses     To Read    :   Past Surgical History:   Procedure Laterality Date    APPENDECTOMY  Unsure When    BREAST SURGERY Right  Or 2006    Right Mastectomy For Breast Cancer    CARDIAC SURGERY  2001    CABG (Two Bypasses)    CHOLECYSTECTOMY, LAPAROSCOPIC   Or     COLONOSCOPY  \"Two\" Last Done     Polyps Removed In Past    DENTAL SURGERY      All Upper Teeth Extracted In Past    DILATATION, ESOPHAGUS  \"3 Or 4\" Last Done     EYE SURGERY Bilateral     Cataracts With Lens Implants    GASTRIC FUNDOPLICATION  3/8/17    Robotic asssited laprascopic nissen fundoplication    HERNIA REPAIR  [address]    Umbilical Hernia Repair    HIATAL HERNIA REPAIR  2014    INGUINAL HERNIA REPAIR Right 's Or     TONSILLECTOMY  's    TUBAL LIGATION  1965    UPPER GASTROINTESTINAL ENDOSCOPY N/A 2019    EGD BIOPSY performed by Jeannette Gutierrez MD at [address] EXTRACTION      Three Removed In Past     Medications:  Scheduled Meds:   metoprolol  100 mg Oral BID    pantoprazole  40 mg Oral QAM AC    sodium chloride flush  10 mL Intravenous 2 times per day    enoxaparin  30 mg Subcutaneous Daily     Continuous Infusions:   sodium chloride 75 mL/hr (20 1201)     PRN Meds:.ondansetron, sodium chloride flush, magnesium hydroxide, ondansetron    No Known Allergies  Social History     Socioeconomic History    Marital status: Single     Spouse name: Not on file    Number of children: Not on file    Years of education: Not on file    Highest education level: Not on file   Occupational History    Not on file   Social Needs    Financial resource strain: Not on file    Food insecurity:     Worry: Not on file     Inability: Not on file    Transportation needs:     Medical: Not on file     Non-medical: Not on file   Tobacco Use    Smoking status: Former Smoker     Years: 7.00     Start date: 1971     Last attempt to quit: 1978     Years since quittin.5    Smokeless tobacco: Never Used   Substance and Sexual Activity    Alcohol use: No    Drug use: No     Comment: \"Smoked About 2 Or 3 Cigarettes A Day When Quit, I Didn't Ever Inhale\"    Sexual activity: Never   Lifestyle    Physical activity:     Days per week: Not on file     Minutes per session: Not on file    Stress: Not on file   Relationships    Social connections:     Talks on phone: Not on file     Gets together: Not on file     Attends [employer] witness service: Not on file     Active member of club or organization: Not on file     Attends meetings of clubs or organizations: Not on file     Relationship status: Not on file    Intimate partner violence:     Fear of current or ex partner: Not on file     Emotionally abused: Not on file     Physically abused: Not on file     Forced sexual activity: Not on file   Other Topics Concern    Not on file   Social History Narrative    Not on file      Family History   Problem Relation Age of Onset    Heart Disease Mother         AAA    Early Death Father 28        Heart Attack    Heart Disease Father         Heart Attack     Early Death Brother         In His 42's Or 52's    Early Death Brother 40        Heart Attack    Heart Disease Brother         Heart Attack    Other Daughter         Pseudo Brain Tumor    Early Death Son 35       Review of Symptoms:    10-point system review completed. All of which are negative except as mentioned above. Physical Exam:           Gen: A&O x 4, NAD, cooperative  HEENT: NC/AT, EOMI, PERRL, mmm, neck supple, no meningeal signs;   Heart: regular   Lungs: no distress  Ext: no edema, no calf tenderness b/l  Psych: normal mood and affect  Skin: no rashes or lesions    NEUROLOGIC EXAM:    Mental Status: A&O to self, location, month and year, NAD, speech clear, language fluent, repetition and naming intact, follows commands appropriately    Cranial Nerve Exam:   CN II-XII: , PERRL, VFF, no nystagmus, no gaze paresis, sensation V1-V3 intact b/l, muscles of facial expression symmetric; hearing intact to conversational tone, palate elevates symmetrically, shoulder elevation symmetric and tongue protrudes midline with movement side to side. Motor Exam:       Antigravity x4  Splayed fingers BUE    Deep Tendon Reflexes: 1/4 biceps, triceps, brachioradialis, patellar, and achilles b/l; flexor plantar responses b/l    Sensation: Intact light touch/pinprick/vibration UE's/LE's b/l, extensive decreased vibratory sensation from toe to head could not feel vibration until sternum     Coordination/Cerebellum:       Tremors--none      Finger-to-Nose: severe dysmetria b/l R>L    Gait and stance:      Gait: deferred      LABS:     Recent Labs     02/11/20  1412 02/12/20  0648   WBC 7.8 7.7   * 130*   K 4.1 4.0   CL 95* 97*   CO2 21 18*   BUN 27* 25*   CREATININE 2.1* 1.7*   GLUCOSE 112* 101*     B12 and SPEP pending       IMAGING:    CT head:  1. No acute intracranial abnormality. 2. Diffuse cerebral atrophy with chronic small vessel ischemic disease.           MRI brain pending     ASSESSMENT/PLAN:     3 67year old female with acute weakness, falls, with splayed fingers b/l secondary to acute TME superimposed on acute dehydration, hyponatremia and extensive neuropathy, likely giving the display of the splayed fingers. No suspicion for GBS. Neuro Exam reveals left dysmetria, will repeat MRI. Repeat B12 and SPEP. MG panel for completeness. PT/OT/ST. ASA/Statin. Continue to follow. Thank you for allowing us to participate in the care of your patient. If there are any questions regarding evaluation please feel free to contact us. PAMELLA Cano CNP, 2/12/2020      ------------------------------------    Attending Note:  I have rounded on this patient with Ginette Aleman CNP. I have reviewed the chart and we have discussed this case in detail. The patient was seen and examined by myself. Pertinent labs and imaging have been personally reviewed.    Our findings and impressions were

## 2020-02-13 NOTE — CARE COORDINATION
Reviewed neurology consult. Patients primary insurance is [employer]. Per [employer] guidelines patient does not meet ARU criteria. Notified Logan Perez CM.

## 2020-02-13 NOTE — CARE COORDINATION
CM spoke with Yancy in ARU. Hetal Ruelas Per insurance guidelines pt is not appropriate for ARU. CM into see pt and her dtr. CM informed her of the above. Cm discussed multiple options for her. Swing bed, SNF or home with home care. CM educated on safety and concerns due to pts inability to assist when getting up. Pt is weak. Pt needed to be returned to bed per ashley lift due to inability to stand. CM provided her brochure for the Swing bed and SNF options with mcr ratings. Dtr informed CM that she would like to speak with her siblings before making a decision. [address]  1325 CM into room and spoke with dtr Kenneth Cason. At this time she is waiting for the rest of her siblings to decide. They will have a definite answer in the morning regarding Swing bed or 2nd option is to return home with [facility]. Dtr shared that she adamantly opposes a SNF option.  [address]

## 2020-02-13 NOTE — PROGRESS NOTES
INTERNAL MEDICINE PROGRESS NOTE        Pallavi Kan   1940   Primary Care Physician:  Osbaldo Levine MD  Admit Date: 2/11/2020     Subjective:   Pt is doing better today. Denies chest pain, SOB, nausea, vomiting, abdominal pain. Remainder of ROS is unremarkable. Meds, labs and other notes reviewed. Appreciate neuro eval and rec. Remains very weak. Objective:   /61   Pulse 72   Temp 98.2 °F (36.8 °C) (Oral)   Resp 17   Ht 5' 4\" (1.626 m)   Wt 202 lb 8 oz (91.9 kg)   SpO2 94%   BMI 34.76 kg/m²  No results for input(s): POCGLU in the last 72 hours. I/O last 3 completed shifts: In: 900 [I.V.:900]  Out: -   No intake/output data recorded. Neck: no adenopathy and supple, symmetrical, trachea midline  Lungs: clear to auscultation bilaterally  Heart: regular rate and rhythm and S1, S2 normal  Abdomen: soft, non-tender; bowel sounds normal; no masses,  no organomegaly  Extremities: extremities normal, atraumatic, no cyanosis. Trace edema      Data Review  CBC with Differential:    Recent Labs     02/11/20  1412 02/12/20  0648   WBC 7.8 7.7   RBC 3.16* 3.00*   HGB 9.8* 9.4*   HCT 30.4* 32.1*    199   MCV 96.2 107.0*   MCH 31.0 31.3*   MCHC 32.2 29.3*   RDW 16.8* 17.2*   SEGSPCT 61.6 49.6   LYMPHOPCT 24.2 31.3   MONOPCT 10.4* 12.7*   BASOPCT 0.5 0.6   MONOSABS 0.8 1.0   LYMPHSABS 1.9 2.4   EOSABS 0.2 0.4   BASOSABS 0.0 0.1   DIFFTYPE AUTOMATED DIFFERENTIAL AUTOMATED DIFFERENTIAL     CMP:    Recent Labs     02/11/20  1412 02/12/20  0648 02/12/20  1909   * 130*  --    K 4.1 4.0  --    CL 95* 97*  --    CO2 21 18*  --    BUN 27* 25*  --    CREATININE 2.1* 1.7*  --    GFRAA 28* 35*  --    LABGLOM 23* 29*  --    GLUCOSE 112* 101*  --    PROT 7.6  --  6.9   LABALBU 3.9  --   --    CALCIUM 9.3 8.8  --    BILITOT 0.7  --   --    ALKPHOS 125  --   --    AST 37  --   --    ALT 24  --   --      PT/INR:  No results for input(s): PROTIME, INR in the last 72 hours.   Meds:    metoprolol  100 mg

## 2020-02-13 NOTE — PROGRESS NOTES
Physical Therapy    Physical Therapy Treatment Note  Name: Ayah Waters MRN: [id number] :   1940   Date:  2020   Admission Date: 2020 Room:  [address] [place], [place] [address]   Restrictions/Precautions:          Communication with other providers:  Per nurse ok to tx  Subjective:  Patient states:  dghter present and reports pt will be going for MRI soon but encouraged her mother to participate in tx session. Pt pleasant and agreeable to tx. Pain:   Location, Type, Intensity (0/10 to 10/10):  No c/o pain during tx session. Objective:    Observation:  Alert and oriented to self. Pt able to follow cues but very uncoordinated UE with activity. Treatment, including education/measures:  dghter was instructed and educated on benefits and safety getting bed in/out of chair position. dghter was able to manage transitioning bed with verbal cues. Pt was able to tolerated chair position x 15 minutes and 10 reps x 2 laqs, 10 reps aps with tactile and verbal cues and 10 reps resisted aps. Pt did appear to have good coordination in legs with ex. Played balloon vollyball working on eye hand and did well and able to hit ball and volly but very poor hand coordination but was able to hold ball when cued. dghter was able to return bed to sup and engage bed alarm with cues. Pt requested bed pain at end of tx and was max assist of 2 to roll and get positioned on bed pain. Safety  Patient left safely in the bed, with call light/phone in reach with alarm applied. Gait belt was used for transfers and gait. Assessment / Impression:       Patient's tolerance of treatment:  good  Adverse Reaction: na  Significant change in status and impact:  na  Barriers to improvement:  Pt is very weak and very poor UE/hand coordination  Plan for Next Session:    Cont.  POC  Time in:  1350  Time out:  1445  Timed treatment minutes:  55  Total treatment time:  54    Previously filed items:     Short term goals  Time Frame for Short term goals: 1 week  Short term goal 1: Patient will perform supine to sit with min A. Short term goal 2: Patient will sit EOB x10 min mod I. Short term goal 3: Patient will perform STS with mod A x1. Short term goal 4: Patient will perform side steps at EOB with mod A x2.         Electronically signed by:    Alejandra Kramer PTA  2/13/2020, 8:44 AM

## 2020-02-14 NOTE — PROGRESS NOTES
Nutrition Assessment    Type and Reason for Visit: Reassess    Nutrition Recommendations:   · Continue current diet and supplements  · Consider appetite stimulant to help pt meet estimated nutrient needs    Nutrition Assessment: Pt continues at high nutrition risk with poor intake. Pt consuming 1-25% of all meals provided per Flowsheet, noted pt with poor appetite. Pt would benefit from appetite stimulant or EN at this time. Malnutrition Assessment:  · Malnutrition Status: Meets the criteria for severe malnutrition  · Context: Social or environmental circumstances  · Findings of the 6 clinical characteristics of malnutrition (Minimum of 2 out of 6 clinical characteristics is required to make the diagnosis of moderate or severe Protein Calorie Malnutrition based on AND/ASPEN Guidelines):  1. Energy Intake-Less than or equal to 50% of estimated energy requirement, Greater than or equal to 3 months    2. Weight Loss-10% loss or greater, in 3 months  3. Fat Loss-Severe subcutaneous fat loss, Orbital  4. Muscle Loss-Severe muscle mass loss, Clavicles (pectoralis and deltoids)  5. Fluid Accumulation-No significant fluid accumulation, Extremities  6.   Strength-Not measured    Nutrition Risk Level: High    Nutrient Needs:  · Estimated Daily Total Kcal: 4824-4647 based on MSJ  · Estimated Daily Protein (g): 54-65 based on 1-1.2 g/kg/IBW  · Estimated Daily Total Fluid (ml/day): 7418-5663 based on 1 mL/kcal    Nutrition Diagnosis:   · Problem: Severe malnutrition, In context of social or environmental circumstances  · Etiology: related to Insufficient energy/nutrient consumption     Signs and symptoms:  as evidenced by Severe loss of subcutaneous fat, Severe muscle loss, Weight loss greater than or equal to 7.5% in 3 months    Objective Information:  · Wound Type: None  · Current Nutrition Therapies:  · Oral Diet Orders: Cardiac   · Oral Diet intake: 1-25%  · Oral Nutrition Supplement (ONS) Orders: Frozen Oral

## 2020-02-14 NOTE — PROGRESS NOTES
Neurology Service Progress Note   [facility]   Patient Name: Kathi Townsend  : 1940        Subjective:   Patient seen and examined today. This is the best I have ever seen Eric Schumacher she is alert she is sitting up in the chair her family is there she is conversing she dates that she feels much better, we reviewed the MRI in relation to her right dysmetria and ataxia, we reviewed her neuropathy, answered all family questions was in the room for about 15 minutes, patient denies chest pain and shortness of breath getting back to her baseline.  RN was in the room making determination disposition.       :     Medications:  Scheduled Meds:   metoprolol  100 mg Oral BID    pantoprazole  40 mg Oral QAM AC    sodium chloride flush  10 mL Intravenous 2 times per day    enoxaparin  30 mg Subcutaneous Daily     Continuous Infusions:   sodium chloride Stopped (20 5408)     PRN Meds:.acetaminophen, ondansetron, sodium chloride flush, magnesium hydroxide, ondansetron    No Known Allergies         Review of Symptoms:    10-point system review completed. All of which are negative except as mentioned above. Physical Exam:           Gen: A&O x 4, NAD, cooperative  HEENT: NC/AT, EOMI, PERRL, mmm, neck supple, no meningeal signs; Heart: regular   Lungs: no distress  Ext: no edema, no calf tenderness b/l  Psych: normal mood and affect  Skin: no rashes or lesions    NEUROLOGIC EXAM:    Mental Status: A&O to self, location, month and year, NAD, speech clear, language fluent, repetition and naming intact, follows commands appropriately    Cranial Nerve Exam:   CN II-XII: , PERRL, VFF, no nystagmus, no gaze paresis, sensation V1-V3 intact b/l, muscles of facial expression symmetric; hearing intact to conversational tone, palate elevates symmetrically, shoulder elevation symmetric and tongue protrudes midline with movement side to side.     Motor Exam:       Antigravity x4  Splayed fingers BUE    Deep Tendon Reflexes: 1/4 biceps, triceps, brachioradialis, patellar, and achilles b/l; flexor plantar responses b/l    Sensation: Intact light touch/pinprick/vibration UE's/LE's b/l, extensive decreased vibratory sensation from toe to head could not feel vibration until sternum     Coordination/Cerebellum:       Tremors--none      Finger-to-Nose: severe dysmetria b/l R>L    Gait and stance:      Gait: deferred      LABS:     Recent Labs     02/11/20  1412 02/12/20  0648   WBC 7.8 7.7   * 130*   K 4.1 4.0   CL 95* 97*   CO2 21 18*   BUN 27* 25*   CREATININE 2.1* 1.7*   GLUCOSE 112* 101*     B12 1786  SPEP pending     IMAGING:    CT head:  1. No acute intracranial abnormality. 2. Diffuse cerebral atrophy with chronic small vessel ischemic disease.           MRI brain:  No evidence of acute intracranial abnormality.       Stable moderate chronic microvascular disease within the periventricular   white matter with associated mild atrophy.       Tiny old right cerebellar lacune.       No abnormal enhancement within the brain. ASSESSMENT/PLAN:     3 67year old female with acute weakness, falls, with splayed fingers b/l secondary to acute TME superimposed on acute dehydration, hyponatremia and extensive neuropathy, likely giving the display of the splayed fingers. No suspicion for GBS. Neuro Exam reveals chronic dysmetria, MRI as above. B12 as above and SPEP pending. MG panel for completeness. PT/OT/ST. ASA/Statin. Stable for discharge. Thank you for allowing us to participate in the care of your patient. If there are any questions regarding evaluation please feel free to contact us.      Gary Roger, PAMELLA - CNP, 2/14/2020

## 2020-02-14 NOTE — DISCHARGE SUMMARY
that if the patient improves  significantly, then the patient will need outpatient colonoscopy. The  patient was discharged to a skilled nursing facility on 01/17/2020 where  she had physical therapy. She was discharged home about a week ago and  once again her condition slowly and gradually deteriorated and she was  brought to the emergency room. In the emergency room, the patient was  seen and evaluated by Dr. Bakari Fritz as well as Dr. Shandra Holliday. Overall laboratory workup is unremarkable. Hemoglobin is stable at 9.8. Creatinine is 2.1. Troponin T 0.039. Chest x-ray shows no pneumonia. CT scan of the head, no acute intracranial abnormality. EKG shows  normal sinus rhythm. The patient has been admitted for further  evaluation with physical therapy and occupational therapy. It also  appears that she will need assist in the discharge plan back to the  nursing home on a long-term basis. Pt was seen by the neurologist.  Repeat MRI brain is unremarkable. Labs unremarkable. No evidence of neuro-muscular disease for now. Pt also seen by PT/OT. Discussed wit pt and family in detail. Declined SNF placement. But agreeable for Swing Bed in [place]. Pt was transferred there. Consults:     neurology     Significant Diagnostic Studies:     LABORATORY STUDIES AND X-RAY FINDINGS:  CT scan of the head, no acute  intracranial abnormality. Diffuse cerebral atrophy with chronic small  vessel ischemic disease.     On the CBC, white cell count is 7.8, hemoglobin 9.8, hematocrit 30.4,  platelet count 369,809. Differential count is normal.  Ammonia level is  47. . Metabolic panel, sodium 574, potassium 4.1, chloride 95,  CO2 21, BUN 27, creatinine 2.1, glucose 112, calcium 9.3. Liver  function tests are normal.  Troponin T is 0.039. TSH 0.911.     Knee x-ray, severe osteoarthritis predominantly involving the medial  compartment and patellofemoral joint.   No acute abnormality.     Electrocardiogram, and ventricles are enlarged. There is low-attenuation within the periventricular white matter deep white matter of the brain. 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 ORBITS: The visualized portion of the orbits demonstrate no acute abnormality. SINUSES: The visualized paranasal sinuses and mastoid air cells demonstrate no acute abnormality. SOFT TISSUES/SKULL:  No acute abnormality of the visualized skull or soft tissues. 1. No acute intracranial abnormality. 2. Diffuse cerebral atrophy with chronic small vessel ischemic disease. Mri Brain W Wo Contrast    Result Date: 2/13/2020  EXAMINATION: MRI OF THE BRAIN WITHOUT AND WITH CONTRAST  2/13/2020 3:10 pm TECHNIQUE: Multiplanar multisequence MRI of the head/brain was performed without and with the administration of intravenous contrast. COMPARISON: 01/14/2020 HISTORY: ORDERING SYSTEM PROVIDED HISTORY: left cerebellar ataxia LUE TECHNOLOGIST PROVIDED HISTORY: Reason for exam:->left cerebellar ataxia LUE Reason for Exam: ataxia, LUE weakness, 10 mL multihance Acuity: Acute Type of Exam: Initial FINDINGS: INTRACRANIAL STRUCTURES/VENTRICLES:  There is no acute infarct. No mass effect or midline shift. No evidence of an acute intracranial hemorrhage. The ventricles and sulci are normal in size and configuration. The sellar/suprasellar regions appear unremarkable. The normal signal voids within the major intracranial vessels appear maintained. No abnormal focus of enhancement is seen within the brain. Moderate chronic small vessel ischemic disease is identified within the periventricular white matter. Mild cerebral atrophy is appreciated. Additional chronic microvascular disease is identified within the pete. Tiny old right cerebellar lacune is identified. ORBITS: The visualized portion of the orbits demonstrate no acute abnormality. SINUSES: The visualized paranasal sinuses and mastoid air cells are well aerated.  BONES/SOFT TISSUES: The bone marrow signal intensity appears normal. The soft tissues demonstrate no acute abnormality. No evidence of acute intracranial abnormality. Stable moderate chronic microvascular disease within the periventricular white matter with associated mild atrophy. Tiny old right cerebellar lacune. No abnormal enhancement within the brain.   -    No results found for this or any previous visit (from the past 24 hour(s)). Disposition:  Swing bed. Patient Instructions:    Didier Self, [address] Medication Instructions [facility]:[id number]    Printed on:02/14/20 1828   Medication Information                      atorvastatin (LIPITOR) 80 MG tablet  TAKE 1 TABLET BY MOUTH ONE TIME A DAY              Cyanocobalamin (VITAMIN B 12 PO)  Take 1 tablet by mouth daily             gemfibrozil (LOPID) 600 MG tablet  Take 600 mg by mouth 2 times daily             lisinopril (PRINIVIL;ZESTRIL) 2.5 MG tablet  TAKE 1 TABLET BY MOUTH ONE TIME A DAY              metFORMIN (GLUCOPHAGE) 500 MG tablet  Take 500 mg by mouth 2 times daily             metoprolol (LOPRESSOR) 100 MG tablet  Take 100 mg by mouth 2 times daily. ondansetron (ZOFRAN ODT) 4 MG disintegrating tablet  Take 1 tablet by mouth every 8 hours as needed for Nausea             pantoprazole (PROTONIX) 40 MG tablet  Take 1 tablet by mouth every morning (before breakfast)                  Diet: DIET CARDIAC; Dietary Nutrition Supplements: Frozen Oral Supplement    Follow-up with Lissa Lanza in 3 days after DC from rehab.      Signed: Lissa Lanza    Time spent on discharge 35 minutes

## 2020-02-14 NOTE — PLAN OF CARE
Nutrition Problem: Severe malnutrition, In context of social or environmental circumstances  Intervention: Food and/or Nutrient Delivery: Continue current diet, Continue current ONS  Nutritional Goals: pt will consume greater than 75% of her meals and supplements

## 2020-02-14 NOTE — PROGRESS NOTES
with call light/phone in reach with alarm applied. Gait belt was used for transfers and gait. Assessment / Impression:       Patient's tolerance of treatment:  good  Adverse Reaction: na  Significant change in status and impact:  na  Barriers to improvement:  Strength and coordination in [address] for Next Session:    Cont. POC  Time in:  1040  Time out:  1135  Timed treatment minutes:  55  Total treatment time:  55    Previously filed items:     Short term goals  Time Frame for Short term goals: 1 week  Short term goal 1: Patient will perform supine to sit with min A. Short term goal 2: Patient will sit EOB x10 min mod I. Short term goal 3: Patient will perform STS with mod A x1. Short term goal 4: Patient will perform side steps at EOB with mod A x2.         Electronically signed by:    Jeff Alston PTA  2/14/2020, 8:25 AM

## 2020-02-14 NOTE — CARE COORDINATION
CM called Ameya Louie Dtr and left a VM regarding decision. CM had a number for Renzo Pastjason but is the wrong number. CM into room and no family. CM awaiting decision regarding discharge plan and poss Swing bed. [facility]  1210 CM called Ameya Louie and the dtrs would like a meeting at 1 pm to discuss the discharge plan. [facility]  1300 CM met with Pt and her family Renzo Pastjason [phone], Ameya Louie [phone] and Mc Hernandez. CM discussed discharge planning. Family are all in agreement for the Swing bed for pt when discharged. CM called and left VM for Sandip Diehl with the Swing Bed to inform her of the above. CM awaiting call back from Sandip Diehl. [facility]  1345 CM called Sandip Diehl with swing bed and left VM. [facility]  1545 CM received call from Sandip Diehl and pt is approved for Swing bed. CM placed a PS to Dr Gabbie Hart and informed. CM asked by Sandip Diehl in [address] [place], [place] [address]Th [facility] for nursing to call [phone] with report. CM gave report to [facility] . CM informed the family and they would like [facility] for transport.

## 2020-02-14 NOTE — PROGRESS NOTES
Lost iv access 2230. Pt had fluids running ns at 75/hr. Attempted to start iv access 3x on available small veins using 24g and 22g without success. Called juana li multiple times only able to access call log perfect serve at this time. Pt is without fluids tonight. Will retry.

## 2020-02-14 NOTE — PLAN OF CARE
Problem: Mobility - Impaired:  Goal: Mobility will improve  Description  Mobility will improve  Outcome: Ongoing     Problem: Risk for Impaired Skin Integrity  Goal: Tissue integrity - skin and mucous membranes  Description  Structural intactness and normal physiological function of skin and  mucous membranes.   Outcome: Ongoing     Problem: Falls - Risk of:  Goal: Will remain free from falls  Description  Will remain free from falls  Outcome: Ongoing  Goal: Absence of physical injury  Description  Absence of physical injury  Outcome: Ongoing     Problem: Nutrition  Goal: Optimal nutrition therapy  Outcome: Ongoing

## 2020-02-14 NOTE — CARE COORDINATION
1-3 ONLY*) - How client eats and drinks (regardless of skill). Includes intake of nourishment by other means (e.g., tube feeding, total parenteral nutrition) 1   Estimated Pre-Admission ADL Value: 10     PATIENT WILL RECEIVE THE FOLLOWING SKILLED SERVICES AS A SWING BED PATIENT:       REHABILITATION (PT/OT/SP)    [] ULTRA HIGH 720 or more minutes minimum per week of at least two (2) disciplines - 1st for at least five (5) days and 2nd for at least three (3) days   [] VERY HIGH 500 or more minutes minimum per week of at least one (1) discipline for at least five (5) days   [] HIGH 325 or more minutes minimum per week of at least one (1) discipline for at least five (5) days   [] MEDIUM 150 or more minutes minimum per week at least five (5) days of any combination with three (3) therapies   [] LOW Restorative nursing at least six (6) days, two (2) activities, or therapies for at least three (3) days at least forty-five (45) minute per week minimum services. EXTENSIVE SERVICES   [] Tracheostomy Care   [] Ventilator/Respirator   [] Infection Isolation   SPECIAL CARE HIGH   [] MS with ADL greater than or equal to 10  [] Quadriplegic with ADL greater than or equal to 5  [] emphysema/COPD and shortness of breath when lying flat  [] Fever w/at least one (1) of the following: [] dehydration [] pneumonia [] vomiting [] weight loss  [] feeding tube  [] Septicemia  [] Coma (not awake & completely dependent in ADL)  [] Diabetes and injections seven (7) days and Dr. order change two (2) or more days.   [] Parenteral/IV feeding  [] respiratory therapy for seven (7) days   SPECIAL CARE LOW:   [] Respiratory Therapy  [] Ulcers (2+sites all stages) w/treatment  [] Multiple Sclerosis  [] Cerebral Palsy  [] Parkinsons Disease  [] Oxygen Therapy  [] Extensive care services w/ADL less than 6  [] Fever w/at least one (1) of the following: [] dehydration [] pneumonia [] vomiting [] weight loss  [] Foot Infection or open lesions on the foot with treatment  [] tube-feeding - calories greater than or equal to 51% or tube feeding with total calories greater than or equal to 26% and fluid parental or enteral intake of greater than or equal to 50 ml per day   CLINICALLY COMPLEX   CURRENTLY:  [] Pneumonia  [] Hemiplegics with ADL with ADL Sum greater than or equal to 10  [] IV medications delivered post admission in the SNF  [x] Surgical wounds or open lesions w/treatment      EVALUATORS SIGNATURE:Helen Short DATE: 2/14/2020       [x] ACCEPTED FOR ADMISSION ON:  02/14/2020                               ELOS:  [] 1-2wks  [] 2-3wks  [] 3-4wks   [] ADMISSION DENIED BASED ON:    [] [facility] Kaleb COORDINATOR

## 2020-02-15 NOTE — H&P
[facility] HOSPITALIST    History and Physical      Name:  Monika Canales /Age/Sex: 1940  (67 y.o. female)   MRN & CSN:  [id number] & [id number] Admission Date/Time: 2020 10:41 PM   Location:   PCP: Bennie Morales MD       Hospital Day: 2    Assessment and Plan:   Monika Canales is a 67 y.o.  female with past medical history of DM type II, CKD, morbid obesity, irritable bowel syndrome, DVT, chronic back pain, hypertension, hyperlipidemia who was transferred from [facility] to swing [facility] for rehab    Admission diagnosis    · Generalized weakness, physical debility and falls -evaluated by neurology, MRI nonacute  · Possible peripheral neuropathy  · CAD/CABG -stable with no chest pain  · DM type II -borderline on no medications  · CKD stage III-IV  · Hypertension  · Hyperlipidemia  · Acid reflux disease    Plan    Admit to swing bed with fall precautions  Start PT/OT and encourage ambulation with assistance  Resume home lisinopril and metoprolol  Hold metformin as blood sugars are below 126  Hold Lipitor due to generalized weakness  Resume Lopid  May have lab work once a week -next will be to 2020    Diet DIET CARDIAC;   Dietary Nutrition Supplements: Frozen Oral Supplement   DVT Prophylaxis [] Lovenox, []  Heparin, [] SCDs, [] No VTE prophylaxis, patient ambulating   GI Prophylaxis [] PPI, [] H2 Blocker, [] No GI prophylaxis, patient is receiving diet/Tube Feeds   Code Status Full Code   Disposition Patient requires continued admission due to weakness debility falls   MDM [] Low, [x] Moderate,[]  High  Patient's risk as above due to      History of Present Illness:     Chief Complaint: Generalized weakness and recurrent falls    Monika Canales is a 67 y.o.  female  with past medical history of CAD/CABG, hypertension, hyperlipidemia, morbid obesity, borderline DM type II who was transferred from [facility] for rehab at the swing bed in [facility]. Former Smoker     Years: 7.00     Start date: 1971     Last attempt to quit: 1978     Years since quittin.5    Smokeless tobacco: Never Used   Substance and Sexual Activity    Alcohol use: No    Drug use: No     Comment: \"Smoked About 2 Or 3 Cigarettes A Day When Quit, I Didn't Ever Inhale\"    Sexual activity: Never   Lifestyle    Physical activity:     Days per week: None     Minutes per session: None    Stress: None   Relationships    Social connections:     Talks on phone: None     Gets together: None     Attends Yazidism service: None     Active member of club or organization: None     Attends meetings of clubs or organizations: None     Relationship status: None    Intimate partner violence:     Fear of current or ex partner: None     Emotionally abused: None     Physically abused: None     Forced sexual activity: None   Other Topics Concern    None   Social History Narrative    None       Medications:   Medications:    enoxaparin  30 mg Subcutaneous Daily    sodium chloride flush  10 mL Intravenous 2 times per day    metoprolol  100 mg Oral BID    pantoprazole  40 mg Oral QAM AC      Infusions:   PRN Meds: magnesium hydroxide, 30 mL, Daily PRN  ondansetron, 4 mg, Q6H PRN  sodium chloride flush, 10 mL, PRN  acetaminophen, 650 mg, Q6H PRN  ondansetron, 4 mg, Q8H PRN      Current home medications   Prior to Admission medications    Medication Sig Start Date End Date Taking?  Authorizing Provider   gemfibrozil (LOPID) 600 MG tablet Take 600 mg by mouth 2 times daily    Historical Provider, MD   metFORMIN (GLUCOPHAGE) 500 MG tablet Take 500 mg by mouth 2 times daily    Historical Provider, MD   Cyanocobalamin (VITAMIN B 12 PO) Take 1 tablet by mouth daily    Historical Provider, MD   pantoprazole (PROTONIX) 40 MG tablet Take 1 tablet by mouth every morning (before breakfast) 19   Graeme Burkitt, MD   ondansetron (ZOFRAN ODT) 4 MG disintegrating tablet Take 1 tablet by mouth every 8 hours as needed for Nausea 1/15/17   Pio Chapman DO   atorvastatin (LIPITOR) 80 MG tablet TAKE 1 TABLET BY MOUTH ONE TIME A DAY  1/5/17   Caemron Ryan MD   lisinopril (PRINIVIL;ZESTRIL) 2.5 MG tablet TAKE 1 TABLET BY MOUTH ONE TIME A DAY  1/5/17   Cameron Ryan MD   metoprolol (LOPRESSOR) 100 MG tablet Take 100 mg by mouth 2 times daily.     Historical Provider, MD       PHYSICIAN CERTIFICATION    I certify that Marcela Marmolejo is expected to be hospitalized for greater than 2 midnights based on the above assessment and plan:     Current diagnosis and plan of management discussed with the patient at the time of admission in lay language     Code status was discussed with patient  - Full code     Pain Assessment -no reported pain at this time    Electronically signed by Josephine Moon MD on 2/15/2020 at 7:39 AM

## 2020-02-15 NOTE — PROGRESS NOTES
2000's); Dental surgery; Starke tooth extraction; Appendectomy (Unsure When); Colonoscopy (\"Two\" Last Done 2000's); Cholecystectomy, laparoscopic (1990's Or 2000's); Breast surgery (Right, 2005 Or 2006); Inguinal hernia repair (Right, 1980's Or 1990's); hernia repair (1960's); Tubal ligation (1965); Tonsillectomy (1970's); Gastric fundoplication (8/6/69); hiatal hernia repair (2014); and Upper gastrointestinal endoscopy (N/A, 12/27/2019). Restrictions  Restrictions/Precautions  Restrictions/Precautions: Fall Risk  Required Braces or Orthoses?: No  Vision/Hearing  Vision: Impaired  Vision Exceptions: Wears glasses for reading  Hearing: Within functional limits     Subjective  General  Chart Reviewed: Yes  Patient assessed for rehabilitation services?: Yes  Family / Caregiver Present: No  Subjective  Subjective: \"I'm tired\"  Pain Screening  Patient Currently in Pain: Yes(no pain at rest reports pain with movement)  Pain Assessment  Pain Assessment: 0-10  Pain Location: Abdomen;Leg  Vital Signs  Patient Currently in Pain: Yes(no pain at rest reports pain with movement)       Orientation  Orientation  Overall Orientation Status: Within Functional Limits  Orientation Level: Oriented to place;Oriented to time;Oriented to situation  Social/Functional History  Social/Functional History  Lives With: Daughter  Type of Home: Apartment  Home Layout: One level  Home Access: Level entry  Bathroom Shower/Tub: Tub/Shower unit, Shower chair with back(p has been spongbathes)  Bathroom Toilet: Standard  Bathroom Equipment: Grab bars in shower, Grab bars around toilet  Home Equipment: Chelo Hugger, Wheelchair-manual(rollator)  Receives Help From: Family  ADL Assistance: Needs assistance  Homemaking Assistance: Needs assistance  Ambulation Assistance: Needs assistance  Transfer Assistance: Independent  Active : No  Additional Comments: P reports she has 3 daughters that assist. P reports that she has not been able to do much.  She to roll. P limited by pain in all movements. Dependent x 2 to scoot up in bed. Transfers  Sit to Stand: (not able to assess 2/2 pain and decreased motor control)  Ambulation  Ambulation?: No  Stairs/Curb  Stairs?: No     Balance  Sitting - Static: Poor(P unable to tolerate sitting EOB >1 min 2/2 pain and ataxic movements)  Sitting - Dynamic: Poor        Plan   Plan  Times per week: 5+(mon-sat)  Plan weeks: 2 weeks or until discharge  Current Treatment Recommendations: Strengthening, Transfer Training, Endurance Training, Neuromuscular Re-education, Patient/Caregiver Education & Training, Pain Management, Equipment Evaluation, Education, & procurement, Balance Training, Gait Training, Home Exercise Program, Safety Education & Training, Positioning, Functional Mobility Training, Stair training, Manual Therapy - Joint Manipulation  Safety Devices  Type of devices: Bed alarm in place, Left in bed, Call light within reach    AM-PAC Score  AM-PAC Inpatient Mobility Raw Score : 8 (02/15/20 1256)  AM-PAC Inpatient T-Scale Score : 28.52 (02/15/20 1256)  Mobility Inpatient CMS 0-100% Score: 86.62 (02/15/20 1256)  Mobility Inpatient CMS G-Code Modifier : CM (02/15/20 1256)          Goals  Short term goals  Time Frame for Short term goals: 1 week  Short term goal 1: Patient will perform bed mobility with min A. Short term goal 2: Patient will sit EOB x5 min with Bryan. Short term goal 3: P will perform stand step transfer with LRAD and min A from bed <>chair. Short term goal 4: P will perform sit <> stand with min A to LRAD.        Therapy Time   Individual Concurrent Group Co-treatment   Time In 7049         Time Out 1058         Minutes 23         Timed Code Treatment Minutes: 13 Minutes       Julio Urbano PT    Electronically signed by Julio Urbano PT on 2/15/2020 at 12:58 PM

## 2020-02-15 NOTE — PROGRESS NOTES
Report called to [facility] bed in [place] all questions answered.  Will give Lopressor tonight before she goes

## 2020-02-15 NOTE — PLAN OF CARE
Problem: Discharge Planning:  Goal: Discharged to appropriate level of care  2/15/2020 1032 by Micah Carson RN  Outcome: Ongoing  2/14/2020 2358 by Ez May RN  Outcome: Ongoing     Problem:  Activity:  Goal: Ability to tolerate increased activity will improve  2/15/2020 1032 by Micah Carson RN  Outcome: Ongoing  2/14/2020 2358 by Ez May RN  Outcome: Ongoing     Problem: NUTRITION  Goal: Patient maintains adequate hydration  2/15/2020 1032 by Micah Carson RN  Outcome: Ongoing  2/14/2020 2358 by Ez May RN  Outcome: Ongoing  Goal: Patient maintains weight  2/15/2020 1032 by Micah Carson RN  Outcome: Ongoing  2/14/2020 2358 by Ez May RN  Outcome: Ongoing     Problem: Risk for Impaired Skin Integrity  Goal: Tissue integrity - skin and mucous membranes  Outcome: Ongoing     Problem: Falls - Risk of:  Goal: Will remain free from falls  Outcome: Ongoing  Goal: Absence of physical injury  Outcome: Ongoing

## 2020-02-16 NOTE — FLOWSHEET NOTE
Assisted onto bedpan, unable to fully roll onto side or assist with positioning. Pt verbalized wanting to get up to toilet, but also verbalized that she cannot stand and her legs give out on her. Pericare provided, pt tolerated was full assist with cares, unable to help, very weak.

## 2020-02-16 NOTE — PROGRESS NOTES
2- 3186    Patient skin assessment completed with this RN, Rhianna Rajan, Supervisor, and [employer]. Patient has right healed mastectomy scar , yeast under left breast, open area 3cm by 2cm right panus, left buttock pink discoloration with shearing noted, right lower buttock has a 1cm by 1cm open area noted, and left upper thigh has a small, healed , dry open area noted.  Also , bilateral , mushy heels are noted,    Brock Asif

## 2020-02-16 NOTE — PLAN OF CARE
Problem: Discharge Planning:  Goal: Discharged to appropriate level of care  2/16/2020 0848 by Clarissa Kumar RN  Outcome: Ongoing  2/16/2020 0426 by Leeanne Boston RN  Outcome: Ongoing     Problem:  Activity:  Goal: Ability to tolerate increased activity will improve  2/16/2020 0848 by Clarissa Kumar RN  Outcome: Ongoing  2/16/2020 0426 by Leeanne Boston RN  Outcome: Ongoing     Problem: NUTRITION  Goal: Patient maintains adequate hydration  2/16/2020 0848 by Clarissa Kumar RN  Outcome: Ongoing  2/16/2020 0426 by Leeanne Boston RN  Outcome: Ongoing  Goal: Patient maintains weight  2/16/2020 0848 by Clarissa Kumar RN  Outcome: Ongoing  2/16/2020 0426 by Leeanne Boston RN  Outcome: Ongoing     Problem: Risk for Impaired Skin Integrity  Goal: Tissue integrity - skin and mucous membranes  2/16/2020 0848 by Clarissa Kumar RN  Outcome: Ongoing  2/16/2020 0426 by Leeanne Boston RN  Outcome: Ongoing     Problem: Falls - Risk of:  Goal: Will remain free from falls  2/16/2020 0848 by Clarissa Kumar RN  Outcome: Ongoing  2/16/2020 0426 by Leeanne Boston RN  Outcome: Ongoing  Goal: Absence of physical injury  2/16/2020 0848 by Clarissa Kumar RN  Outcome: Ongoing  2/16/2020 0426 by Leeanne Boston RN  Outcome: Ongoing     Problem: Pain:  Goal: Pain level will decrease  2/16/2020 0848 by Clarissa Kumar RN  Outcome: Ongoing  2/16/2020 0426 by Leeanne Boston RN  Outcome: Ongoing  Goal: Control of acute pain  2/16/2020 0848 by Clarissa Kumar RN  Outcome: Ongoing  2/16/2020 0426 by Leeanne Boston RN  Outcome: Ongoing  Goal: Control of chronic pain  2/16/2020 0848 by Clarissa Kumar RN  Outcome: Ongoing  2/16/2020 0426 by Leeanne Boston RN  Outcome: Ongoing

## 2020-02-17 NOTE — PROGRESS NOTES
Physical Therapy    Physical Therapy Treatment Note  Name: Ginger Hess MRN: [id number] :   1940   Date:  2020   Admission Date: 2020 Room:  [address] [place] [address]   Restrictions/Precautions:  Restrictions/Precautions  Restrictions/Precautions: Fall Risk  Required Braces or Orthoses?: No       Communication with other providers:  Co-treat with OT  Subjective:  Patient states:  P agreeable to therapy. P with limited speaking during session but uses gestures primarily to communicate. P's 2 daughters present during the session. Pain:   Location, Type, Intensity (0/10 to 10/10):  L leg pain with movement. Objective:    Observation:  P supine in bed. P with dystonia movements at rest.  Treatment, including education/measures:  Bed mobility: P performs supine to sit with mod A x 2 to assist with scooting to EOB. P limited by LLE pain. P able to initiate LE movements and is limited by UE/LE ataxia and decreased ability to push with UEs. P performs sit to supine with max A x 2. P is dependent to scoot up in bed with assist x 2. Sitting balance: P sits EOB x 15 min with CGA to min A for stability. P limited by decreased LE stability demonstrating ataxia and synergetic LE movement with UE movement. P performs seated exercise reaching with SHON to targets x 10 reps with significant ataxia and dysmetria noted. P initially limited by fear than able to participate with assistance. P with full effort but limited by dystonic movements and impaired motor control. P performs lateral lean with focus on UE weight bearing x 3 reps JOAN. P requires mod A to R and min A to L to control. Education: P and p's family educated on plan for therapy and progression of activities. Educated on use of call light and practiced pressing button. P left supine in bed with call light within reach and bed alarm on. Assessment / Impression:    P with improved sitting tolerance compared to evaluation.  P with significant apparent neuro involvement PT  2/17/2020, 4:43 PM

## 2020-02-18 NOTE — PROGRESS NOTES
effects    [x] Patient limited by fatigue  [] Patient limited by pain   [] Patient limited by medical complications:    [] Adverse reaction to Tx:   [] Significant change in status    Safety:       []  bed alarm set    [x]  chair alarm set    []  Pt refused alarms                []  Telesitter activated      [x]  Gait belt used during tx session      []other:       Number of Minutes/Billable Intervention  Therapeutic Exercise    ADL Self-care 15   Neuro Re-Ed    Therapeutic Activity 45   Group    Other:    TOTAL 60       Social History  Social/Functional History  Lives With: Daughter  Type of Home: Apartment  Home Layout: One level  Home Access: Level entry  Bathroom Shower/Tub: Tub/Shower unit, Shower chair with back(sponge bathes)  Bathroom Toilet: Standard  Bathroom Equipment: 3-in-1 commode  Home Equipment: Pat Anirudh, Wheelchair-manual(rollator)  Receives Help From: Family  ADL Assistance: Independent  Homemaking Assistance: Independent  Homemaking Responsibilities: Yes  Meal Prep Responsibility: Primary(before she was initially hospitalized)  Laundry Responsibility: Primary  Cleaning Responsibility: Primary  Shopping Responsibility: Primary  Ambulation Assistance: Needs assistance  Transfer Assistance: Needs assistance  Active : No  Additional Comments: P reports she has 3 daughters that assist. P reports that she has not been able to do much. She endorses she was in nursing home and discharge from there she could walk which does not align with notes in chart. Objective                                                                                    Goals:  (Update in navigator)  Short term goals  Time Frame for Short term goals: until discharge  Short term goal 1: Pt will be Min A x1 for functional adl transfers to chair, toilet or bed w/o LOB or falls  Short term goal 2: In a supported position, pt will be able to feed self with necessary a.e. Short term goal 3:  In supported position, Pt will be SBA for UB bathing/Mod A LB bathing  Short term goal 4: In a supported position Pt will be Min A UB dressing and with compensatory techniques Mod A LB dressing  Short term goal 5: Pt will be min vc for UE strengthening and weightbearing activities to improve coordination and assist with strength for transfers:   :        Plan of Care                                                                              Times per week: 5 days per week for a minimum of 60 minutes/day plus group as appropriate for 60 minutes.   Treatment to include Plan  Times per week: 3+  Current Treatment Recommendations: Strengthening, ROM, Endurance Training, Neuromuscular Re-education, Patient/Caregiver Education & Training, Equipment Evaluation, Education, & procurement, Balance Training, Pain Management, Self-Care / ADL, Functional Mobility Training, Safety Education & Training, Home Management Training    Electronically signed by   Kaila Boston,  2/18/2020, 5:51 PM

## 2020-02-18 NOTE — H&P
SYSTEM PROVIDED HISTORY: abnormal contractures TECHNOLOGIST PROVIDED HISTORY: Reason for exam:->abnormal contractures Has a \"code stroke\" or \"stroke alert\" been called? ->No Reason for Exam: abnormal contractures Acuity: Acute Type of Exam: Initial Additional signs and symptoms: none Relevant Medical/Surgical History: poor historian FINDINGS: BRAIN/VENTRICLES: There is no acute intracranial hemorrhage, mass effect or midline shift. No abnormal extra-axial fluid collection. The gray-white differentiation is maintained without evidence of an acute infarct. There is no evidence of hydrocephalus. The cerebral sulci and ventricles are enlarged. There is low-attenuation within the periventricular white matter deep white matter of the brain. 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 ORBITS: The visualized portion of the orbits demonstrate no acute abnormality. SINUSES: The visualized paranasal sinuses and mastoid air cells demonstrate no acute abnormality. SOFT TISSUES/SKULL:  No acute abnormality of the visualized skull or soft tissues. 1. No acute intracranial abnormality. 2. Diffuse cerebral atrophy with chronic small vessel ischemic disease. Mri Brain W Wo Contrast    Result Date: 2/13/2020  EXAMINATION: MRI OF THE BRAIN WITHOUT AND WITH CONTRAST  2/13/2020 3:10 pm TECHNIQUE: Multiplanar multisequence MRI of the head/brain was performed without and with the administration of intravenous contrast. COMPARISON: 01/14/2020 HISTORY: ORDERING SYSTEM PROVIDED HISTORY: left cerebellar ataxia LUE TECHNOLOGIST PROVIDED HISTORY: Reason for exam:->left cerebellar ataxia LUE Reason for Exam: ataxia, LUE weakness, 10 mL multihance Acuity: Acute Type of Exam: Initial FINDINGS: INTRACRANIAL STRUCTURES/VENTRICLES:  There is no acute infarct. No mass effect or midline shift. No evidence of an acute intracranial hemorrhage. The ventricles and sulci are normal in size and configuration.   The sellar/suprasellar regions appear unremarkable. The normal signal voids within the major intracranial vessels appear maintained. No abnormal focus of enhancement is seen within the brain. Moderate chronic small vessel ischemic disease is identified within the periventricular white matter. Mild cerebral atrophy is appreciated. Additional chronic microvascular disease is identified within the pete. Tiny old right cerebellar lacune is identified. ORBITS: The visualized portion of the orbits demonstrate no acute abnormality. SINUSES: The visualized paranasal sinuses and mastoid air cells are well aerated. BONES/SOFT TISSUES: The bone marrow signal intensity appears normal. The soft tissues demonstrate no acute abnormality. No evidence of acute intracranial abnormality. Stable moderate chronic microvascular disease within the periventricular white matter with associated mild atrophy. Tiny old right cerebellar lacune. No abnormal enhancement within the brain.   -    DATA:    CBC   Recent Labs     02/17/20  0600   WBC 7.5   HGB 8.8*   HCT 27.4*         BMP   Recent Labs     02/17/20  0600      K 3.4*      CO2 18*   BUN 17   CREATININE 1.6*     LFT'S No results for input(s): AST, ALT, ALB, BILIDIR, BILITOT, ALKPHOS in the last 72 hours. COAG No results for input(s): INR in the last 72 hours. CARDIAC ENZYMES  No results for input(s): CKTOTAL, CKMB, CKMBINDEX, TROPONINI in the last 72 hours.   U/A:    Lab Results   Component Value Date    COLORU YELLOW 02/12/2020    WBCUA 175 02/12/2020    RBCUA 8 02/12/2020    MUCUS RARE 02/12/2020    BACTERIA NEGATIVE 02/12/2020    CLARITYU SLIGHTLY CLOUDY 02/12/2020    SPECGRAV 1.011 02/12/2020    LEUKOCYTESUR LARGE 02/12/2020    BLOODU SMALL 02/12/2020         Anju Ratliff MD  Rounding Hospitalist

## 2020-02-18 NOTE — FLOWSHEET NOTE
Physical Therapy DailyTreatment Note   Date: 2020 Room: [unfilled]   Name: Kathi Townsend : 1940   MRN: [id number]   Admission Date:2020        Rehabilitation Diagnosis: Weakness [R53.1]  Recurrent falls [R29.6]    Objective                                                                                    Goals:  (Update in navigator)  Short term goals  Time Frame for Short term goals: 1 week  Short term goal 1: Patient will perform bed mobility with min A. - not met, continue. Short term goal 2: Patient will sit EOB x5 min with Bryan. - not met, continue. Short term goal 3: P will perform stand step transfer with LRAD and min A from bed <>chair. - not met, continue. Short term goal 4: P will perform sit <> stand with min A to LRAD. - not met, continue.    :        Plan of Care                                                                              Times per week: 5+ days per week for a minimum of 15 minutes/day  Treatment to include Current Treatment Recommendations: Strengthening, Transfer Training, Endurance Training, Neuromuscular Re-education, Patient/Caregiver Education & Training, Pain Management, Equipment Evaluation, Education, & procurement, Balance Training, Gait Training, Home Exercise Program, Safety Education & Training, Positioning, Functional Mobility Training, Stair training, Manual Therapy - Joint Manipulation    Date  2020   TIMES IN/OUT   2:25-3:25 pm   Restrictions/Precautions Restrictions/Precautions: Fall Risk         Current Diet/Swallowing Issues DIET CARDIAC; Dietary Nutrition Supplements: Frozen Oral Supplement   Communication with other providers: [x] Ok to see per nursing at morning huddle  [] Medical hold and reason  [x] OT co treat   Subjective observations:  Pt reports that her L knee hurts. Pt demonstrated PF and inverted L LE and less PF R LE. Pt had increased control and movement of R LE and UE.   Pt demonstrated hyperextension of B UE and muscle wasting B.    [x]   Gait belt used during tx session   Pain level/location: Pre-tx not reported. Pt reports her L knee hurt. Post-tx not reported. Bed Mobility   Rolling R: mod A  Rolling L: mod A  Scooting: dependent 2 person A   Transfers Sit to stand: dependent - ashley  Stand to sit dependent - ashley  Commode not performed   Standing Tolerance Pt attempted to stand 2x from chair with bed rail and max A x2. Pt was able to lift her bottom from the chair briefly. Amb/Locomotion: AD/Distance/Assist Not performed   Strategies that improved performance: Pt required encouragement to continue to push herself and to eat. PT called cafeteria to help pt get food she wants. Barriers to progress/participation: Minimal.    Alarm placed/where? Fall Risk [ ] left in bed  [x] left in chair [x] call light within reach  [ ] bed alarm on  [x] personal alarm on [ ] Naye Bradford  [ ] other staff present:   Discharge recommendations  Anticipated discharge date:  TBD  Destination: []home alone   []home alone with assist prn  []Continuous supervision  [x]SNF/ECF  [] Assisted living   Continued therapy: []C PT  []OUTPATIENT  PT   [x]  Further PT  Equipment needs: see eval.  Pt would benefit from dynamic ankle DF boot to aide ankle DF while in bed. Therapeutic activities/exercises completed this date: Pt rolled in bed to get changed, get cleaned up, and get ashley pad under her. Pt tolerated well with some complaints of pain in L knee. Pt reported being very tired afterward.      Modality/intervention used:   [ ] Therapeutic Exercise    [ ] Modalities:   [x] Therapeutic Activity    [ ] Ultrasound   [ ] Elec Stim   [ ] Gait Training     [ ] Cervical Traction  [ ] Lumbar Traction   [ ] Neuromuscular Re-education   [ ] Cold/hotpack  [ ] Iontophoresis   [ ] Instruction in HEP     [ ] Henok Brock   [ ] Manual Therapy   [ ] Aquatic Therapy     Patient/Caregiver Education and Training: Pt educated on how ashley is used and on the importance of getting up. Pt also educated on the importance of eating so she can keep her strength up to work with therapy. Exercise/Equipment/Modalities this date                       Treatment Plan for Next Session: continue per POC and pt tolerance.      Assessment / Impression                                                          Treatment/Activity Tolerance:   [x] Tolerated Treatment well:     [x] Patient limited by fatigue/pain:       [x] Patient limited by medical complications:    [] Adverse Reaction to Tx:   [] Significant change in status    Plan:   [x] Continue per plan of care [ ] Jac Garibay current plan   [ ] Plan of care initiated [ ] Hold pending MD visit [ ] Discharged    Billing:  Billed units: 4 therapeutic activity       Fransisca Lu DPT [id number]  2/18/2020, 3:31 PM

## 2020-02-18 NOTE — PROGRESS NOTES
Occupational Therapy   Occupational Therapy Initial Assessment  Date: 2020   Patient Name: Andreas Beverly  MRN: [id number]     : 1940    Date of Service: 2020    Discharge Recommendations:   SNF       Assessment   Performance deficits / Impairments: Decreased functional mobility ; Decreased ADL status; Decreased ROM; Decreased strength;Decreased safe awareness;Decreased balance;Decreased sensation;Decreased endurance;Decreased cognition;Decreased high-level IADLs;Decreased coordination;Decreased posture  Assessment: 66 yo admitted to swingbed program.  Pt presentswith decreased I adls, transfers, coordination and endurance. OT to see pt to maximize I with adls, transfers, balance and coordination  Treatment Diagnosis: decreased balance, I adls, transfers  Prognosis: Fair  Decision Making: High Complexity  History: see above  Exam: see above  OT Education: OT Role;ADL Adaptive Strategies;Transfer Training  REQUIRES OT FOLLOW UP: Yes  Activity Tolerance  Activity Tolerance: Patient limited by fatigue;Patient limited by pain  Safety Devices  Safety Devices in place: Yes  Type of devices: Bed alarm in place;Call light within reach; Left in bed;Nurse notified           Patient Diagnosis(es): There were no encounter diagnoses. has a past medical history of Acid reflux, Arthritis, Back pain, CAD (coronary artery disease), Diabetes mellitus ([facility] [address] Hiatal hernia, Hx of blood clots, Hx of migraines, HX OTHER MEDICAL, Hyperlipidemia, Hypertension, IBS (irritable bowel syndrome), Right Breast Cancer, Shortness of breath on exertion, UTI (urinary tract infection), Wears dentures, and Wears glasses. has a past surgical history that includes Cardiac surgery (); Dilatation, esophagus (\"3 Or 4\" Last Done ); eye surgery (Bilateral, ); Dental surgery; [place] tooth extraction; Appendectomy (Unsure When); Colonoscopy (\"Two\" Last Done ); Cholecystectomy, laparoscopic ( Or );  Breast surgery (Right, 2005 Or 2006); Inguinal hernia repair (Right, 1980's Or 1990's); hernia repair (1960's); Tubal ligation (1965); Tonsillectomy (1970's); Gastric fundoplication (8/0/36); hiatal hernia repair (2014); and Upper gastrointestinal endoscopy (N/A, 12/27/2019). Treatment Diagnosis: decreased balance, I adls, transfers      Restrictions  Restrictions/Precautions  Restrictions/Precautions: Fall Risk  Required Braces or Orthoses?: No    Subjective   General  Patient assessed for rehabilitation services?: Yes  Family / Caregiver Present: Yes(daughters)  Pain Assessment  Pain Assessment: 0-10  Pain Level: 0  Vital Signs  Temp: 97.9 °F (36.6 °C)  Temp Source: Temporal  Pulse: 75  Heart Rate Source: Monitor  Resp: 20  BP: (!) 120/53  BP Location: Right Arm  BP Upper/Lower: Upper  MAP (mmHg): 76  Oxygen Therapy  SpO2: 97 %  O2 Device: None (Room air)  Social/Functional History  Social/Functional History  Lives With: Daughter  Type of Home: Apartment  Home Layout: One level  Home Access: Level entry  Bathroom Shower/Tub: Tub/Shower unit, Shower chair with back(sponge bathes)  Bathroom Toilet: Standard  Bathroom Equipment: 3-in-1 commode  Home Equipment: Cane, Wheelchair-manual(rollator)  Receives Help From: Family  ADL Assistance: Independent  Homemaking Assistance: Independent  Homemaking Responsibilities: Yes  Meal Prep Responsibility: Primary(before she was initially hospitalized)  Laundry Responsibility: Primary  Cleaning Responsibility: Primary  Shopping Responsibility: Primary  Ambulation Assistance: Needs assistance  Transfer Assistance: Needs assistance  Active : No  Additional Comments: P reports she has 3 daughters that assist. P reports that she has not been able to do much. She endorses she was in nursing home and discharge from there she could walk which does not align with notes in chart.        Objective   Vision: Impaired  Vision Exceptions: Wears glasses for reading  Hearing: Within Training, Neuromuscular Re-education, Patient/Caregiver Education & Training, Equipment Evaluation, Education, & procurement, Balance Training, Pain Management, Self-Care / ADL, Functional Mobility Training, Safety Education & Training, Home Management Training    G-Code     OutComes Score                                                  AM-PAC Score       AM-PAC 6 click short form for inpatient daily activity:   How much help from another person does the patient currently need. .. Unable  Dep A Lot  Max A A Lot   Mod A A Little  Min A A Little   CGA  SBA None   Mod I  Indep  Sup   1. Putting on and taking off regular lower body clothing? [x] 1    [] 2   [] 2   [] 3   [] 3   [] 4      2. Bathing (including washing, rinsing, drying)? [x] 1   [] 2   [] 2 [] 3 [] 3 [] 4   3. Toileting, which includes using toilet, bedpan, or urinal? [x] 1    [] 2   [] 2   [] 3   [] 3   [] 4     4. Putting on and taking off regular upper body clothing? [x] 1   [] 2   [] 2   [] 3   [] 3    [] 4      5. Taking care of personal grooming such as brushing teeth? [x] 1   [] 2    [] 2 [] 3    [] 3   [] 4      6. Eating meals? [x] 1   [] 2   [] 2   [] 3   [] 3   [] 4      Raw Score: 6/24  Fully dependent 100%     [24=0% impaired(CH), 23=1-19%(CI), 20-22=20-39%(CJ), 15-19=40-59%(CK), 10-14=60-79%(CL), 7-9=80-99%(CM), 6=100%(CN)]         Goals  Short term goals  Time Frame for Short term goals: until discharge  Short term goal 1: Pt will be Min A x1 for functional adl transfers to chair, toilet or bed w/o LOB or falls  Short term goal 2: In a supported position, pt will be able to feed self with necessary a.e. Short term goal 3: In supported position, Pt will be SBA for UB bathing/Mod A LB bathing  Short term goal 4:  In a supported position Pt will be Min A UB dressing and with compensatory techniques Mod A LB dressing  Short term goal 5: Pt will be min vc for UE strengthening and weightbearing activities to improve coordination and assist with strength for transfers  Patient Goals   Patient goals : Pt would like to get better and eventually go home       Therapy Time   Individual Concurrent Group Co-treatment   Time In       1510   Time Out       1530   Minutes       20   Timed Code Treatment Minutes: Hanh Diehl 92, OT

## 2020-02-19 NOTE — CARE COORDINATION
SWING BED WEEKLY TEAM SHEET     Alexi León   2/19/2020 WEEK # 1    Care Management    Issues to be resolved before discharge: Increased strength, ability to transfer    Family Education: Patient/staff to update family    Discharge Plan: Uncertain at this time  Patient/Family Adjustment: May not be able to care for patient in the home     Goals of previous week:   [] 1st team   [] met   [] partially met    [x] not met             Why goals were not met: Severe neurological deficits     Skilled Level of Care Remains   [x] yes   [] no    Estimated length of stay: 2-3 weeks   Patient/Family Concerns/input: Concerned about patient condition and ability to care for her at home    Patient/Family  Signature _________________  2/19/2020       Care Management Signature:  Gilda Ramos RN

## 2020-02-19 NOTE — PLAN OF CARE
Nutrition Problem: Severe malnutrition, due to severe fat and muscle loss, poor intakes, significant weight loss  Intervention: Food and/or Nutrient Delivery: Modify diet to general, Continue current ONS  Nutritional Goals: pt will consume greater than 75% of her meals and supplements

## 2020-02-19 NOTE — CARE COORDINATION
UPDATE: NRD 02/21/2020. Spoke to [facility]- they did receive the SNF notification form, case still in processing phase but no issues. ref # [id number].   TS

## 2020-02-19 NOTE — PROGRESS NOTES
Sit--> Stand: D,  Performed 2 trials from EOB up to [facility]. Pt able to slightly lift buttocks on 2nd attempt with max A x2. Pt unable to maintain  handle on Stedy. Trialed pushing up support from forearm after s/u on stedy  Stand --> Sit:     Stand-Pivot:   x  Other:    Assistive device required for transfer:   Pt required used of María Elena lift to trf from bed into chair      Functional Mobility:  Pt required [employer] lift to trf from bed to chair  Assistance:  D  Device:   []   Oneta Call     []   Standard Walker []   Wheelchair        []   [employer]       []   Gwendloyn Foyer         []   Cardiac Keri Foots       []   Other:        Homemaking Tasks: Additional Therapeutic activities/exercises completed this date:     []   ADL Training   [x]   Balance/Postural training     [x]   Bed/Transfer Training   []   Endurance Training   []   Neuromuscular Re-ed   []   Nu-step:  Time:        Level:         #Steps:       []   Rebounder:    []  Seated     []  Standing        []   Supine Ther Ex (reps/sets):     [x]   Seated Ther Ex (reps/sets):  Pt completed BUE elbow flex/ext with difficulty with UE control and Pt would hit face. Holding therapist's hand for min resistance Pt improved control and able to perform 10x RUE/LUE   []   Standing Ther Ex (reps/sets):     []   Other:      Comments:      Patient/Caregiver Education and Training:   []   Adaptive Equipment Use  [x]   Bed Mobility/Transfer Technique/Safety  []   Energy Conservation Tips  []   Family training  [x]   Postural Awareness  []   Safety During Functional Activities  []   Reinforced Patient's Precautions   []   Progress was updated and reviewed in Rehabtracker with patient and/or family this         date. Treatment Plan for Next Session: Continue current POC      Assessment:  Pt was motivated to work with therapy but was apprehensive when trying to stand up with Stedy d/t fear of falling. Therapists encouraged Pt was safe.   Pt continues to have ataxia

## 2020-02-20 NOTE — PROGRESS NOTES
Physical Therapy    Physical Therapy Treatment Note  Name: Mitchell Acevedo MRN: [id number] :   1940   Date:  2020   Admission Date: 2020 Room:  015015-01   Restrictions/Precautions:  Restrictions/Precautions  Restrictions/Precautions: Fall Risk  Required Braces or Orthoses?: No       Communication with other providers:  Nursing reported p was recently given benadryl  Subjective:  Patient states:  \"I am tired\"  Pain:   Location, Type, Intensity (0/10 to 10/10):  L knee pain  Objective:    Observation:  P supine in bed. Treatment, including education/measures:  Bed mobility: P performs supine to sit with mod A to assist with LEs and mod A to scoot anteriorly to EOB. P limited by decreased ability to weight bear through BUEs and ataxia noted in LLE. Functional mobility: P sits EOB with CGA to min A. BP assessed and WFL after p reporting she was dizzy. P sits x 15 minutes with forearm support on julia plus during set up. P with improved weight bearing through forearms and stability in sitting. Attempt 1 stand which was unsuccessful due to fit of harness. P too fatigued at this time to attempt again. Plan to attempt for tomorrow. P assisted back to bed with max A x 2 and scooted up in dependent. Educated on safety with bed mobility and plan for therapy. P left supine in bed with nurse's aide assisting with bathing. Assessment / Impression:    P with improved bed mobility and sitting tolerance. P continues to be limited by impaired motor control and ataxia.  Recommend continued skilled PT to address deficits and maximize functional potential.   Patient's tolerance of treatment:  fair   Adverse Reaction: fatigue  Significant change in status and impact:  Improved sitting balance  Barriers to improvement:  Impaired motor control, medical condition  Plan for Next Session:    Continue to work with transfers  Time in:  1425  Time out:  1450  Timed treatment minutes:  25  Total treatment time: 25    Previously filed items:  Social/Functional History  Lives With: Daughter  Type of Home: Apartment  Home Layout: One level  Home Access: Level entry  Bathroom Shower/Tub: Tub/Shower unit, Shower chair with back(sponge bathes)  Bathroom Toilet: Standard  Bathroom Equipment: 3-in-1 commode  Home Equipment: Cane, Wheelchair-manual(rollator)  Receives Help From: Family  ADL Assistance: Independent  Homemaking Assistance: Independent  Homemaking Responsibilities: Yes  Meal Prep Responsibility: Primary(before she was initially hospitalized)  Laundry Responsibility: Primary  Cleaning Responsibility: Primary  Shopping Responsibility: Primary  Ambulation Assistance: Needs assistance  Transfer Assistance: Needs assistance  Active : No  Additional Comments: P reports she has 3 daughters that assist. P reports that she has not been able to do much. She endorses she was in nursing home and discharge from there she could walk which does not align with notes in chart. Short term goals  Time Frame for Short term goals: 1 week  Short term goal 1: Patient will perform bed mobility with min A. Short term goal 2: Patient will sit EOB x5 min with Bryan. Short term goal 3: P will perform stand step transfer with LRAD and min A from bed <>chair. Short term goal 4: P will perform sit <> stand with min A to LRAD.        Electronically signed by:    Jose Angel Bahena, PT  2/20/2020, 4:28 PM

## 2020-02-20 NOTE — PROGRESS NOTES
Physical Rehabilitation: OCCUPATIONAL THERAPY     [x] daily progress note       [] discharge       Patient Name:  Susi Dave   :  1940 MRN: [id number]  Room:  [facility]/015-01 Date of Admission: 2020  Rehabilitation Diagnosis:   Weakness [R53.1]  Recurrent falls [R29.6]       Date 2020       Day of ARU Week:  7   Time IN/OUT 1425/1450   Individual Tx Minutes    Group Tx Minutes    Co-Treat Minutes 25   Concurrent Tx Minutes    TOTAL Tx Time Mins 25   Variance Time    Variance Time []   Refusal due to:     []   Medical hold/reason:    []   Illness   []   Off Unit for test/procedure  []   Extra time needed to complete task  []   Therapeutic need  []   Other (specify):   Restrictions Restrictions/Precautions  Restrictions/Precautions: Fall Risk  Required Braces or Orthoses?: No      Communication with other providers: [x]   OK to see per nursing:     [x]   Spoke with team member regarding:  Co-treat PT    Subjective observations and cognitive status: Pt presents asleep supine in bed. Pt was encourage to participate in therapy to get out of bed. Pain level/location:    10       Location: Pt reported pain in LLE but did not quantify   Discharge recommendations  Anticipated discharge date:  TBD  Destination: []home alone   []home alone w assist prn [] home w/ family    [] Continuous supervision       [x]SNF            [] Assisted living     [] Other:   Continued therapy: []HHC OT  []OUTPATIENT  OT   [] No Further OT  Equipment needs: TBD   (HIT F2 to transition between stars)    Eating/Feeding:     Extremity Used:        []    R UE []    L UE         Dentures: []   N/A    []    Partial []    Full  Adaptive Equipment Used:        Bed Mobility:           []   Pt received out of bed   Rolling R/L:  Mod A  Scooting:  Min A  Supine --> Sit: Mod A x2  Sit --> Supine:  Max A x2    Transfers:    Sit--> Stand: D,  Performed 2 trials from EOB up using [employer]. Pt was unable to try to stand.   She stated that she was tired and dizzy from a shot(Nurse had confirmed giving her a benedryl shot) but pt's posture was better using the [facility] when sitting up on the bed and also did wt bearing on her BUE  Stand --> Sit:     Stand-Pivot:   x  Other:    Assistive device required for transfer:   x      Functional Mobility:  Pt laid back down due to being tired and dizzy from benedryl  Assistance:  D  Device:   []   Rolling Walker     []   Standard Walker []   Wheelchair        []   [address] [facility]       []   Caprice Hew         []   Cardiac Bo        []   Other:        Homemaking Tasks: Additional Therapeutic activities/exercises completed this date:     []   ADL Training   [x]   Balance/Postural training     [x]   Bed/Transfer Training   []   Endurance Training   []   Neuromuscular Re-ed   []   Nu-step:  Time:        Level:         #Steps:       []   Rebounder:    []  Seated     []  Standing        []   Supine Ther Ex (reps/sets):     [x]   Seated Ther Ex (reps/sets):  Pt completed BUE elbow flex/ext with difficulty with UE control and Pt would hit face. Holding therapist's hand for min resistance Pt improved control and able to perform 10x RUE/LUE   []   Standing Ther Ex (reps/sets):     []   Other:      Comments:      Patient/Caregiver Education and Training:   []   Adaptive Equipment Use  [x]   Bed Mobility/Transfer Technique/Safety  []   Energy Conservation Tips  []   Family training  [x]   Postural Awareness  []   Safety During Functional Activities  []   Reinforced Patient's Precautions   []   Progress was updated and reviewed in Rehabtracker with patient and/or family this         date. Treatment Plan for Next Session: Continue current POC      Assessment:  Pt was motivated to work with therapy but was apprehensive when trying to stand up with Stedy d/t fear of falling. Therapists encouraged Pt was safe.   Pt continues to have ataxia in BUE and significant weakness and unable to push up from bed with BUE or pull position, pt will be able to feed self with necessary a.e. Short term goal 3: In supported position, Pt will be SBA for UB bathing/Mod A LB bathing  Short term goal 4: In a supported position Pt will be Min A UB dressing and with compensatory techniques Mod A LB dressing  Short term goal 5: Pt will be min vc for UE strengthening and weightbearing activities to improve coordination and assist with strength for transfers:   :        Plan of Care                                                                              Times per week: 5 days per week for a minimum of 60 minutes/day plus group as appropriate for 60 minutes.   Treatment to include Plan  Times per week: 3+  Current Treatment Recommendations: Strengthening, ROM, Endurance Training, Neuromuscular Re-education, Patient/Caregiver Education & Training, Equipment Evaluation, Education, & procurement, Balance Training, Pain Management, Self-Care / ADL, Functional Mobility Training, Safety Education & Training, Home Management Training    Electronically signed by   ERUM Marcelino/L [id number]  2/20/2020, 6:59 PM

## 2020-02-21 PROBLEM — S82.402A: Status: ACTIVE | Noted: 2020-01-01

## 2020-02-21 PROBLEM — S82.452A CLOSED DISPLACED COMMINUTED FRACTURE OF SHAFT OF LEFT FIBULA: Status: ACTIVE | Noted: 2020-01-01

## 2020-02-21 NOTE — DISCHARGE SUMMARY
tablet  Take 1 tablet by mouth every 8 hours as needed for Nausea             pantoprazole (PROTONIX) 40 MG tablet  Take 1 tablet by mouth every morning (before breakfast)                  Code Status: Full Code     Consults:   None    Diet: regular diet    Activity: activity as tolerated   Work:    Discharged Condition: fair    Prognosis: Fair    Disposition: transfer to [facility] to be evaluated by orthopedic sugeon      Follow-up with   1. PCP within   5-7    Days       Discharge Physician Signed: Byod Blackmon M.D. The patient was seen and examined on day of discharge and this discharge summary is in conjunction with any daily progress note from day of discharge.   Time spent on discharge in the examination, evaluation, counseling and review of medications and discharge plan:22 minutes

## 2020-02-21 NOTE — DISCHARGE INSTR - COC
EXTRACTION      Three Removed In Past       Immunization History:   Immunization History   Administered Date(s) Administered    Pneumococcal Polysaccharide ([id number]) 08/18/2012       Active Problems:  Patient Active Problem List   Diagnosis Code    Hypertension I10    Diabetes mellitus ([facility] E11.9    Hyperlipidemia E78.5    Acid reflux K21.9    Chest pain R07.9    CAD (coronary artery disease) I25.10    [facility]) [facility] N17.9    Lower abdominal pain R10.30    Intractable nausea and vomiting R11.2    Duodenitis K29.80    Recurrent falls R29.6    Elevated troponin R79.89    Generalized weakness R53.1    Severe malnutrition (HCC) E43    Closed fracture of shaft of left fibula, initial encounter S82.402A    Closed displaced comminuted fracture of shaft of left fibula S82.452A       Isolation/Infection:   Isolation          No Isolation        Patient Infection Status     None to display          Nurse Assessment:  Last Vital Signs: There were no vitals taken for this visit. Last documented pain score (0-10 scale):    Last Weight:   Wt Readings from Last 1 Encounters:   02/14/20 200 lb 1.6 oz (90.8 kg)     Mental Status:  {IP PT MENTAL STATUS:52842}    IV Access:  - None    Nursing Mobility/ADLs:  Walking   Dependent  Transfer  Dependent  Bathing  Dependent  Dressing  Dependent  Toileting  Dependent  Feeding  Assisted  Med Admin  Dependent  Med Delivery   none    Wound Care Documentation and Therapy:  Incision 08/01/14 Abdomen Mid;Right;Left (Active)   Number of days: 2030        Elimination:  Continence:   · Bowel: No  · Bladder: No  Urinary Catheter: None   Colostomy/Ileostomy/Ileal Conduit: No       Date of Last BM: 2/20/2020  No intake or output data in the 24 hours ending 02/21/20 1459  No intake/output data recorded. Safety Concerns:      At Risk for Falls    Impairments/Disabilities:      None    Nutrition Therapy:  Current Nutrition Therapy:   - Oral Diet: General    Routes of Feeding: Oral  Liquids: Thin Liquids  Daily Fluid Restriction: no  Last Modified Barium Swallow with Video (Video Swallowing Test): not done    Treatments at the Time of Hospital Discharge:   Respiratory Treatments: ***  Oxygen Therapy:  is not on home oxygen therapy. Ventilator:    - No ventilator support    Rehab Therapies: Physical Therapy and Occupational Therapy  Weight Bearing Status/Restrictions: Touchdown weight bearing (10-25 lbs) only on left leg  Other Medical Equipment (for information only, NOT a DME order):  walker  Other Treatments: ***    Patient's personal belongings (please select all that are sent with patient):  Sunshine    RN SIGNATURE:  Electronically signed by Katharine Escamilla RN on 2/21/20 at 3:02 PM    CASE MANAGEMENT/SOCIAL WORK SECTION    Inpatient Status Date: ***    Readmission Risk Assessment Score:  Readmission Risk              Risk of Unplanned Readmission:        13           Discharging to Facility/ Agency   · Name:   · Address:  · Phone:  · Fax:    Dialysis Facility (if applicable)   · Name:  · Address:  · Dialysis Schedule:  · Phone:  · Fax:    / signature: {Esignature:[id number]}    PHYSICIAN SECTION    Prognosis: {Prognosis:4037743381}    Condition at Discharge: [facility] Patient Condition:030258199}    Rehab Potential (if transferring to Rehab): {Prognosis:0982999051}    Recommended Labs or Other Treatments After Discharge: ***    Physician Certification: I certify the above information and transfer of Rut Nelson  is necessary for the continuing treatment of the diagnosis listed and that she requires {Admit to Appropriate Level of Care:18723} for {GREATER/LESS:954103872} 30 days.      Update Admission H&P: {CHP DME Changes in MEZOE:904280257}    PHYSICIAN SIGNATURE:  {Esignature:[id number]}

## 2020-02-21 NOTE — CARE COORDINATION
Called [facility] with update that no clinicals will be sent in on patient due to the patient change in status to Inpatient versus SWING.   TS

## 2020-02-21 NOTE — H&P
Medical History:   Diagnosis Date    Acid reflux     Arthritis     \"Back\"    Back pain     \"Back Hurts Sometimes\"    CAD (coronary artery disease)     Sees Dr. Yudith Connell Diabetes mellitus [facility]) Dx 2000's    \"Borderline\"    Hiatal hernia     Hx of blood clots 2001    \"Blood Clots Legs After Heart Surgery\"    Hx of migraines     HX OTHER MEDICAL     Primary Care Physician Is Dr. Sadiq Jean Baptiste Hyperlipidemia     Hypertension     IBS (irritable bowel syndrome)     Right Breast Cancer 2005 Or 2006     Right Breast Mastectomy    Shortness of breath on exertion     UTI (urinary tract infection) In Past    No Current Symptoms    Wears dentures     Full Upper    Wears glasses     To Read     PSHX:  has a past surgical history that includes Cardiac surgery (2001); Dilatation, esophagus (\"3 Or 4\" Last Done 7-14); eye surgery (Bilateral, 2000's); Dental surgery; [place] tooth extraction; Appendectomy (Unsure When); Colonoscopy (\"Two\" Last Done 2000's); Cholecystectomy, laparoscopic (1990's Or 2000's); Breast surgery (Right, 2005 Or 2006); Inguinal hernia repair (Right, 1980's Or 1990's); hernia repair (1960's); Tubal ligation (1965); Tonsillectomy (1970's); Gastric fundoplication (1/0/59); hiatal hernia repair (2014); and Upper gastrointestinal endoscopy (N/A, 12/27/2019). Meds - list of home medications reviewed in electronic chart and confirmed  Allergies: No Known Allergies    FAM HX: family history includes Early Death in her brother; Early Death (age of onset: 28) in her father; Early Death (age of onset: 28) in her son; Early Death (age of onset: 40) in her brother; Heart Disease in her brother, father, and mother; Other in her daughter.   Soc HX:   Social History     Socioeconomic History    Marital status: Single     Spouse name: None    Number of children: None    Years of education: None    Highest education level: None   Occupational History    None   Social Needs    Financial resource \"stroke alert\" been called? ->No Reason for Exam: abnormal contractures Acuity: Acute Type of Exam: Initial Additional signs and symptoms: none Relevant Medical/Surgical History: poor historian FINDINGS: BRAIN/VENTRICLES: There is no acute intracranial hemorrhage, mass effect or midline shift. No abnormal extra-axial fluid collection. The gray-white differentiation is maintained without evidence of an acute infarct. There is no evidence of hydrocephalus. The cerebral sulci and ventricles are enlarged. There is low-attenuation within the periventricular white matter deep white matter of the brain. 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 ORBITS: The visualized portion of the orbits demonstrate no acute abnormality. SINUSES: The visualized paranasal sinuses and mastoid air cells demonstrate no acute abnormality. SOFT TISSUES/SKULL:  No acute abnormality of the visualized skull or soft tissues. 1. No acute intracranial abnormality. 2. Diffuse cerebral atrophy with chronic small vessel ischemic disease. Mri Cervical Spine Wo Contrast    Result Date: 2/19/2020  EXAMINATION: MRI OF THE CERVICAL SPINE WITHOUT CONTRAST 2/19/2020 9:02 am TECHNIQUE: Multiplanar multisequence MRI of the cervical spine was performed without the administration of intravenous contrast. COMPARISON: None HISTORY: ORDERING SYSTEM PROVIDED HISTORY: radiculopathy TECHNOLOGIST PROVIDED HISTORY: Reason for exam:->radiculopathy Reason for Exam: Recent fall, ataxia. Pain Acuity: Acute Type of Exam: Initial Additional signs and symptoms: No HX of CA or surgery, Pt was unable to hold still throughout exam. FINDINGS: Multiple sequences are degraded by motion. BONES/ALIGNMENT: Normal alignment of the spine. Vertebral body heights are maintained. No concerning marrow signal abnormality. Spinal canal is congenitally narrow. SPINAL CORD: No abnormal cord signal is seen.  SOFT TISSUES: Paraspinal soft tissues are normal.  No prevertebral edema. C2-C3: Posterior disc osteophyte complex and bilateral uncovertebral and facet joint degenerative changes. No spinal canal or neural foraminal stenosis. C3-C4: Posterior disc osteophyte complex and bilateral uncovertebral and facet joint degenerative changes. Mild spinal canal stenosis. Severe right and mild left neural foraminal stenosis. C4-C5: Posterior disc osteophyte complex and bilateral uncovertebral and facet joint degenerative changes. Moderate spinal canal stenosis. Severe bilateral neural foraminal stenosis. C5-C6: Posterior disc osteophyte complex and bilateral uncovertebral and facet joint degenerative changes. Mild spinal canal stenosis. Severe right and mild left neural foraminal stenosis. C6-C7: Posterior disc osteophyte complex and bilateral uncovertebral and facet joint degenerative changes. No spinal canal or neural foraminal stenosis. C7-T1: No disc bulge or protrusion. Mild facet joint degenerative changes. No spinal canal or neural foraminal stenosis. Motion degraded study. Multilevel degenerative changes of the cervical spine, superimposed on a congenitally narrow canal.  There is moderate spinal canal stenosis at C4-C5 and mild spinal canal stenosis at C3-C4 and C5-C6. Multilevel neural foraminal narrowing, including severe right neural foraminal stenosis from C3-C4 through C5-C6.      Mri Lumbar Spine Wo Contrast    Result Date: 2/19/2020  EXAMINATION: MRI OF THE LUMBAR SPINE WITHOUT CONTRAST, 2/19/2020 9:02 am TECHNIQUE: Multiplanar multisequence MRI of the lumbar spine was performed without the administration of intravenous contrast. COMPARISON: None HISTORY: ORDERING SYSTEM PROVIDED HISTORY: radiculopathy TECHNOLOGIST PROVIDED HISTORY: Reason for exam:->radiculopathy Reason for Exam: recent fall, ataxia pain Acuity: Acute Type of Exam: Initial Additional signs and symptoms: No HX of CA or surgery, Pt was unable to hold still throughout exam. FINDINGS: ______________________________________________________________    Electronically signed by Aracely José MD on 2/21/2020 at 10:02 AM

## 2020-02-21 NOTE — DISCHARGE SUMMARY
Susi Dave 1940 [id number]  PCP:  Shiloh Gupta MD    Admit date: 2/14/2020  Admitting Physician: No admitting provider for patient encounter. Discharge date: 2/21/2020 Discharge Physician: Yeni Skaggs MD      Reason for admission: No chief complaint on file. Present on Admission:   Recurrent falls      Plase note: a discharge order was completed          Discharge Diagnoses Include:  · Left distal fibula fracture  · Generalized weakness, physical debility and falls -evaluated by neurology, MRI nonacute  · Possible peripheral neuropathy  · CAD/CABG -stable with no chest pain  · DM type II -borderline on no medications  · CKD stage III-IV  · Hypertension  · Hyperlipidemia  · Acid reflux disease  · Spasticity and incontinence    Hospital Course[de-identified]  Patient was admitted to [facility] for rehab needs following an inpatient stay at [facility] for weakness, low appetite and falls. Feb 11th 2020 she had a plain film of her knee which showed no fracture. She has spacticity and bladder incontinence and underwent neurologic workup consisting of CT+MRI of the brain which showed no stroke. While at [facility] her spacticity was addressed by trials of siniment, steroid and IM benadryl. Her family noted that she was favoring her right and protective of her Left Lower Limb so an XR was ordered which showed distal minimally displaced fibula fracture for which patient is being discharged from [facility] for assessment by orthopedic surgeon.      Pt was personally examined by me on the day of discharge with the following findings: feels cold, not hungry, has some pain in her left leg  Vitals:    02/21/20 0754   BP: (!) 142/67   Pulse: 80   Resp: 18   Temp: 97.3 °F (36.3 °C)   SpO2: 100%     Gen: ao nad  Heent: ncat  Lungs: ctabl  Car: nl s1s2  Abd: soft ntnd  Ext: LLE has no gross deformity  Skin: warm+dry  Neuro: cn 2-12 grossly intact  Significant Diagnostic Studies at discharge:   CBC:   Lab Results Component Value Date    WBC 7.5 02/17/2020    RBC 2.82 02/17/2020    HGB 8.8 02/17/2020    HCT 27.4 02/17/2020    MCV 97.2 02/17/2020    MCH 31.2 02/17/2020    MCHC 32.1 02/17/2020    RDW 16.5 02/17/2020     02/17/2020    MPV 10.9 02/17/2020     BMP:    Lab Results   Component Value Date     02/17/2020    K 3.4 02/17/2020     02/17/2020    CO2 18 02/17/2020    BUN 17 02/17/2020    LABALBU 3.9 02/11/2020    LABALBU 100 12/07/2019    CREATININE 1.6 02/17/2020    CALCIUM 8.5 02/17/2020    GFRAA 38 02/17/2020    LABGLOM 31 02/17/2020    GLUCOSE 121 02/17/2020       Patient Instructions:   Amisha Dawson   [place] Medication Instructions ZLA:[id number]    Printed on:02/21/20 9151   Medication Information                      atorvastatin (LIPITOR) 80 MG tablet  TAKE 1 TABLET BY MOUTH ONE TIME A DAY              Cyanocobalamin (VITAMIN B 12 PO)  Take 1 tablet by mouth daily             gemfibrozil (LOPID) 600 MG tablet  Take 600 mg by mouth 2 times daily             lisinopril (PRINIVIL;ZESTRIL) 2.5 MG tablet  TAKE 1 TABLET BY MOUTH ONE TIME A DAY              metFORMIN (GLUCOPHAGE) 500 MG tablet  Take 500 mg by mouth 2 times daily             metoprolol (LOPRESSOR) 100 MG tablet  Take 100 mg by mouth 2 times daily. ondansetron (ZOFRAN ODT) 4 MG disintegrating tablet  Take 1 tablet by mouth every 8 hours as needed for Nausea             pantoprazole (PROTONIX) 40 MG tablet  Take 1 tablet by mouth every morning (before breakfast)                  Code Status: Full Code     Consults:   None    Diet: regular diet    Activity: activity as tolerated   Work:    Discharged Condition: fair    Prognosis: Fair    Disposition: inpatient at       Follow-up with   1. PCP within   5-7    Days    Follow up labs: none       Discharge Physician Signed: Bucky George M.D.     The patient was seen and examined on day of discharge and this discharge summary is in conjunction with any daily progress note from

## 2020-02-22 NOTE — H&P
[address] [place] [address], [address] [place] [address]                              HISTORY AND PHYSICAL    PATIENT NAME: Kimber Galvin                       :        1940  MED REC NO:   [id number]                          ROOM:       4022  ACCOUNT NO:   [de-identified]                           ADMIT DATE: 2020  PROVIDER:     Kaci Elkins MD    CHIEF COMPLAINT:  Closed fracture of left fibula. HISTORY OF PRESENT ILLNESS:  The patient is a 67-[facility]-old [facility]  [place] female with multiple medical problems. The patient has been  admitted to [facility] few times within the  last couple of months. The patient was admitted to [facility] initially in 2019 and then she was readmitted on  2020. Her main issue seems to be generalized weakness, poor  appetite and frequent falls. In 2019, the patient was seen and  evaluated by her cardiologist as well as gastroenterologist.  A 2D echo  and a stress test was normal.  Dr. Parish Castellanos performed an upper endoscopy  which revealed gastritis and duodenitis. The patient was started on  Protonix and Carafate. The patient was readmitted in 2020, at this time she was seen by the  neurologist.  The patient had a CT scan of the head as well as MRI of  the brain. There was no acute stroke. There was no evidence of normal  pressure hydrocephalus. Neurologist recommended continuing physical  therapy and occupational therapy. The patient was also seen by Dr. Smith King  from Gastroenterology and he had no further recommendations. The  patient therefore was discharged to a skilled nursing facility on  2020. From there, the patient was discharged home. Within a  couple of days, the patient's weakness worsened, she had multiple falls  and she was brought to the emergency room.   The patient was evaluated in  the emergency room on 2020 and was admitted for further  progression of her weakness. There was no evidence of infection or a  stroke at that time. Once again, she was seen by the neurologist.  She  had a repeat MRI of the brain. There was no evidence of hemorrhage or  stroke. The patient also underwent further laboratory workup and there  was no evidence of neuromuscular disease. She also had an EMG a couple  of weeks ago. The patient did have testing for multiple myeloma which  was within normal limits. She also had testing for myasthenia gravis  and antibodies were negative. Her usual labs including thyroid profile,  vitamin S69, folic acid, were all within normal limits. The patient was  discharged to swing bed on 02/14/2020 for further rehabilitation. In the last couple of days, family has noted that the patient is  favoring her right leg. This prompted further x-rays of the left lower  extremity and she was noted to have a left fibular fracture. The  patient was therefore transferred to [facility]  for further evaluation by Orthopedic Surgery. Dr. Jamie Ramos was contacted  by the hospitalist from [place] and he was able to evaluate the patient  at [facility]. ALLERGIES:  No known drug allergies. MEDICATIONS:  Previously she had been on Lopid 600 mg b.i.d., metformin  500 mg twice a day, vitamin B12 tablet daily, Remeron 15 mg, Humalog  insulin as needed, potassium 10 mEq, Protonix 40 mg, Maxzide 1 tablet,  Zofran as needed, Lipitor 80 mg, Prinivil 2.5 mg, Carafate 1 gm four  times a day, and metoprolol 100 mg b.i.d. Within the last couple of days, the patient was started on prednisone 10  mg and Sinemet 25/100 mg 1 tablet three times a day by the hospitalist  in [facility] in [place].     PAST MEDICAL HISTORY:  Hypertension, hyperlipidemia, diabetes, chronic  kidney disease, obesity, gastroesophageal reflux disease, hiatal hernia,  irritable bowel syndrome, recently diagnosed gastritis and duodenitis. She also has a history of right breast cancer, chronic back pain, DVT. PAST SURGICAL HISTORY:  Include wisdom tooth extraction, tonsillectomy,  hiatal hernia repair, cataract surgery, esophageal dilatation,  cholecystectomy, coronary artery bypass graft surgery in 2001, right  mastectomy. She also had appendectomy and repeat hiatal hernia repair. SOCIAL HISTORY:  Negative for smoking, alcohol use or drug use. She  quit smoking in 1978. FAMILY HISTORY:  Mother had hypertension, coronary artery disease,  abdominal aortic aneurysm. Father had coronary artery disease. PHYSICAL EXAMINATION:  VITAL SIGNS:  Temperature 98.1 degrees Fahrenheit, blood pressure  121/71, pulse 77 respiratory rate 18, oxygen saturation 96% on room air. HEENT:  Conjunctivae are normal.  Sclerae are nonicteric. Oropharynx is  moist.  NECK:  Supple. CHEST:  Clear to auscultation bilaterally. There is no wheezing, rales,  crackles or rhonchi. HEART:  Rate and rhythm regular. Normal S1, S2.  ABDOMEN:  Obese, soft, nontender. Positive bowel sounds. EXTREMITIES:  Trace edema. No calf tenderness. NEUROLOGIC:  The patient is awake, alert, oriented. The patient has  generalized weakness. No focal deficit. LABORATORY STUDIES AND X-RAY FINDINGS:  Left lower extremity x-ray done  earlier today reveals acute to subacute fracture of the distal fibula,  minimal displacement is present. Severe degenerative changes of the  left knee, no acute soft tissue abnormality. CBC and comprehensive  metabolic panel has been ordered at [facility],  results are pending at the time of my dictation. IMPRESSION:  1. Acute to subacute closed fracture of the distal fibula with minimum  displacement. 2.  Generalized weakness with recurrent falls, unclear etiology. 3.  Hypertension. 4.  Hyperlipidemia. 5.  Diabetes mellitus. 6.  Coronary artery disease.   7.  Chronic kidney disease. 8.  Obesity. 9.  Gastritis and duodenitis. PLAN:  The patient was seen and evaluated earlier today by the hospice  at [facility] in [place] with x-ray findings she has been transferred. The patient's medicines are reviewed and reconciled. Dr. Ekta Hallman from  Orthopedic Surgery has been consulted. The patient's labs have been  ordered and they will be reviewed when available. Further  recommendations to follow.         Jefferson Grande MD    D: 02/21/2020 19:08:25       T: 02/21/2020 19:18:27     [email or web address]  Job#: [id number]     Doc#: [id number]    CC:  Mindy Beckham MD

## 2020-02-22 NOTE — PROGRESS NOTES
PROGRESS NOTE  MD Freddy Victor    Admit Date: 2/21/2020      Subjective:     Chief Complaint: leg pain    No complaints of pain currently    No CP, SOB        Scheduled Meds:   enoxaparin  30 mg Subcutaneous Daily    metoprolol  100 mg Oral BID    pantoprazole  40 mg Oral QAM AC    lisinopril  2.5 mg Oral Daily    insulin lispro  0-6 Units Subcutaneous TID WC    insulin lispro  0-3 Units Subcutaneous Nightly     Continuous Infusions:   dextrose       PRN Meds:magnesium hydroxide, acetaminophen, ondansetron, sodium chloride, glucose, dextrose, glucagon (rDNA), dextrose      Objective:     Patient Vitals for the past 8 hrs:   BP Temp Temp src Pulse Resp SpO2 Height Weight   02/22/20 0537 (!) 152/59 98.4 °F (36.9 °C) Oral 78 16 97 % 5' 5\" (1.651 m) 203 lb 8 oz (92.3 kg)     I/O last 3 completed shifts: In: 240 [P.O.:240]  Out: -   No intake/output data recorded. GENERAL: alert; no apparent distress  HEENT: no scleral icterus; conjunctiva pink  PULM: Clear to auscultation bilaterally  CARDIAC: regular rate and rhythm, no murmurs  ABDOMEN: soft, non-tender, non-distended. Bowel sounds normo-active. EXT: no cyanosis, clubbing, or edema    LABS FROM TODAY REVIEWED    Assessment:     Principal Problem:    Closed fracture of shaft of left fibula, initial encounter  Resolved Problems:    * No resolved hospital problems.  *      Plan:     Closed fracture left fibula- await eval by ortho    Will start folic acid pO - was low 2/12, and rarely this can cause neurologic symptoms      Marius Olszewski, MD

## 2020-02-23 NOTE — PROGRESS NOTES
Pt is alert and confused. Pt is incontinent of urine. Pt is not c/o pain. Pt is turn every 2 hours. Pt takes meds whole. Pt will drink well with assistance. Pt accu checks have been wnl at 88, 103, and 94. Pt family would like to have pt restarted on sinnemet due to tremors of pt hands. Pt was on sinnemet when she was in [facility] and family reported pt tremors went away and pt was able to use her hands to feed self. Pt was offered assistance with eating and refused food.

## 2020-02-23 NOTE — CONSULTS
Department of Orthopedic SurgeryConsult Note        Reason for Consult:  Left fibula fracture    Requesting Physician: MD Kendall Davis (1940)    2/23/2020      CHIEF COMPLAINT:  left fibula fracture    History Obtained From:  patient, electronic medical record    HISTORY OF PRESENT ILLNESS:                The patient is a 67 y.o. female who presents with above chief complaint. Patient experienced a fall approximately 2 weeks ago at her nursing facility. Patient was able to bear weight after the incident. Due to continued favoring of the left LE, left leg xrays were obtained and identified a left distal fibula fracture and advanced left knee OA. Fracture was identified at [facility] in [facility]. Patient was transferred to [facility] for medical management and fracture care.      Past Medical History:        Diagnosis Date    Acid reflux     Arthritis     \"Back\"    Back pain     \"Back Hurts Sometimes\"    CAD (coronary artery disease)     Sees Dr. Mary Peters Diabetes mellitus [facility]) Dx 2000's    \"Borderline\"    Hiatal hernia     Hx of blood clots 2001    \"Blood Clots Legs After Heart Surgery\"    Hx of migraines     HX OTHER MEDICAL     Primary Care Physician Is Dr. Becky Vega Hyperlipidemia     Hypertension     IBS (irritable bowel syndrome)     Right Breast Cancer 2005 Or 2006     Right Breast Mastectomy    Shortness of breath on exertion     UTI (urinary tract infection) In Past    No Current Symptoms    Wears dentures     Full Upper    Wears glasses     To Read     Past Surgical History:        Procedure Laterality Date    APPENDECTOMY  Unsure When    BREAST SURGERY Right 2005 Or 2006    Right Mastectomy For Breast Cancer    CARDIAC SURGERY  2001    CABG (Two Bypasses)    CHOLECYSTECTOMY, LAPAROSCOPIC  1990's Or 2000's    COLONOSCOPY  \"Two\" Last Done 2000's    Polyps Removed In Past    DENTAL SURGERY      All Upper Teeth Extracted In Past  DILATATION, ESOPHAGUS  \"3 Or 4\" Last Done 7-14    EYE SURGERY Bilateral 2000's    Cataracts With Lens Implants    GASTRIC FUNDOPLICATION  4/5/23    Robotic asssited laprascopic nissen fundoplication    HERNIA REPAIR  8360'F    Umbilical Hernia Repair    HIATAL HERNIA REPAIR  2014    INGUINAL HERNIA REPAIR Right 1980's Or 1990's    TONSILLECTOMY  1970's    TUBAL LIGATION  1965    UPPER GASTROINTESTINAL ENDOSCOPY N/A 12/27/2019    EGD BIOPSY performed by Rafael Pearce MD at [address] EXTRACTION      Three Removed In Past     Current Medications:   Current Facility-Administered Medications: melatonin tablet 3 mg, 3 mg, Oral, Nightly PRN  carbidopa-levodopa (SINEMET)  MG per tablet 1 tablet, 1 tablet, Oral, TID  folic acid (FOLVITE) tablet 1 mg, 1 mg, Oral, Daily  enoxaparin (LOVENOX) injection 30 mg, 30 mg, Subcutaneous, Daily  magnesium hydroxide (MILK OF MAGNESIA) 400 MG/5ML suspension 30 mL, 30 mL, Oral, Daily PRN  acetaminophen (TYLENOL) tablet 650 mg, 650 mg, Oral, Q6H PRN  metoprolol tartrate (LOPRESSOR) tablet 100 mg, 100 mg, Oral, BID  ondansetron (ZOFRAN-ODT) disintegrating tablet 4 mg, 4 mg, Oral, Q8H PRN  pantoprazole (PROTONIX) tablet 40 mg, 40 mg, Oral, QAM AC  sodium chloride (OCEAN, BABY AYR) 0.65 % nasal spray 1 spray, 1 spray, Each Nostril, Q2H PRN  lisinopril (PRINIVIL;ZESTRIL) tablet 2.5 mg, 2.5 mg, Oral, Daily  glucose (GLUTOSE) 40 % oral gel 15 g, 15 g, Oral, PRN  dextrose 50 % IV solution, 12.5 g, Intravenous, PRN  glucagon (rDNA) injection 1 mg, 1 mg, Intramuscular, PRN  dextrose 5 % solution, 100 mL/hr, Intravenous, PRN  insulin lispro (HUMALOG) injection vial 0-6 Units, 0-6 Units, Subcutaneous, TID WC  insulin lispro (HUMALOG) injection vial 0-3 Units, 0-3 Units, Subcutaneous, Nightly  Allergies:  Patient has no known allergies. Social History:   TOBACCO:   reports that she quit smoking about 41 years ago. She started smoking about 48 years ago.  She tissue abnormality identified. IMPRESSION/RECOMMENDATIONS:    Assessment: Left lateral malleolus fracture     Plan:  1) Fracture appears stable. 2) Fracture boot to left LE and ok to advance weight bearing as tolerated in the fracture boot. 3) PT/OT for mobilization training. 4) F/U in office in 10-14 days for follow up xray evaluation.      Feroz

## 2020-02-23 NOTE — PROGRESS NOTES
PROGRESS NOTE  Dasia Armenta MD    Sosa Busby    Admit Date: 2/21/2020      Subjective:     Chief Complaint: leg pain    No complaints of pain currently    No CP, SOB      Per family pt has some benefit with sinemet while in [place] - will resume    Poor PO intake      Scheduled Meds:   carbidopa-levodopa  1 tablet Oral TID    folic acid  1 mg Oral Daily    enoxaparin  30 mg Subcutaneous Daily    metoprolol  100 mg Oral BID    pantoprazole  40 mg Oral QAM AC    lisinopril  2.5 mg Oral Daily    insulin lispro  0-6 Units Subcutaneous TID WC    insulin lispro  0-3 Units Subcutaneous Nightly     Continuous Infusions:   dextrose       PRN Meds:melatonin, magnesium hydroxide, acetaminophen, ondansetron, sodium chloride, glucose, dextrose, glucagon (rDNA), dextrose      Objective:     Patient Vitals for the past 8 hrs:   BP Temp Pulse Resp SpO2 Weight   02/23/20 0915 (!) 121/48 98.4 °F (36.9 °C) 79 18 96 % --   02/23/20 0332 (!) 119/59 -- -- -- -- --   02/23/20 0330 (!) 125/109 98 °F (36.7 °C) 73 16 97 % 203 lb 8 oz (92.3 kg)     I/O last 3 completed shifts: In: 473 [P.O.:785]  Out: -   No intake/output data recorded. GENERAL: alert; no apparent distress  HEENT: no scleral icterus; conjunctiva pink  PULM: Clear to auscultation bilaterally  CARDIAC: regular rate and rhythm, no murmurs  ABDOMEN: soft, non-tender, non-distended. Bowel sounds normo-active. EXT: no cyanosis, clubbing, or edema    LABS FROM TODAY REVIEWED    Assessment:     Principal Problem:    Closed fracture of shaft of left fibula, initial encounter  Resolved Problems:    * No resolved hospital problems.  *      Plan:     Closed fracture left fibula- await eval by ortho    Will start folic acid pO - was low 2/12, and rarely this can cause neurologic symptoms    Will restart sinemet    Dr Saul Da Silva to resume care in Cecilia Pickett MD

## 2020-02-23 NOTE — CONSULTS
[address] 56, 1940, 7122/8589-C, 2/23/2020    Discharge Recommendation: SNF ([id number])  Equipment: defer    History  [place]:  There were no encounter diagnoses. Subjective:  Patient states:  Agreeable to tx, minimally conversant, alert and oriented. dtrs provide much of social/functional.    Pain:  Denies at rest, increases with transfer attempts LLE. Communication with other providers: RN- notified of severe coordination deficits and posturing in L UE, seemingly involuntary movements. Dr. Ghulam Parekh is aware  Restrictions: fall risk, WBAT LLE with boot    Home Setup/Prior level of function  Social/Functional History  Lives With: Daughter  Type of Home: Apartment  Home Access: Level entry  Bathroom Shower/Tub: Tub/Shower unit, Shower chair with back  Bathroom Toilet: Standard  Bathroom Equipment: 3-in-1 commode  Home Equipment: Aba Griffin, [address] 42 Help From: Family  ADL Assistance: Independent  Homemaking Assistance: Independent  Homemaking Responsibilities: Yes  Additional Comments: dtr's describe continuous decline since admission at [facility]    Examination of body systems (includes body structures/functions, activity/participation limitations):  · Observation:  Supine in bed upon arrival, flat affect  · Vision:  [facility] [place]  · Hearing:  [facility]  · Cardiopulmonary:  WFL  · Cognition: alert and oriented, slow info processing. Decreased memory and attention per family, see OT/SLP note for further evaluation. Body Structures/Function  · ROM R/L:  WFL, fracture boot fitted prior to transfer attempts    · Strength R/L:  3+/5, weakness observed in function. · Neuro:  Pt with severe intention tremor and posturing of left UE as well as some high amplitude involuntary left UE/LE movements at rest. Significant retropulsion with transfer attempts      Mobility:  · Rolling L/R:  Max A   · Supine to sit:   Max A-- LE assist bilat to clear EOB, hand placement cues with manual facilitation to grasp contra rail. Involuntary left UE movements impair grasp and ability to scoot to EOB. 2 person assist for sit to sup and supine scooting  · Transfers: Attempted x 3 from EOB with RW- ataxic LUE requiring physical assist to maintain grasp, severely retropulsive with premature knee ext, unable to clear SHON from EOB despite max A from therapist and foot block  · Sitting balance:  SBA, sits at midline without sway. · Standing balance:  unable. · Gait: unable     [facility] 6 Clicks Inpatient Mobility:   Current: 8/24  PLOF: 24/24    Treatment:  Therapeutic Activity Training:   Therapeutic activity training was instructed today. Cues were given for safety, sequence, UE/LE placement, awareness, and balance. Activities performed today included bed mobility training, sup-sit, sit-stand, SPT. Safety:  patient left in chair with chair alarm, call light within reach, RN notified, gait belt used. Assessment:  Pt is a 66 yo female who presents to [facility] with LLE pain and debility. She has had complicated recent medical history including multiple admissions for weakness and falls. Most recently was at [facility] swing bed and continuing to decline in function. Found to have nondisplaced L fibula fracture, seen by ortho, WBAT with boot. Pt exhibiting severe intention tremor with LUE, poor coordination and proprioception in LUE/LE. Imaging has consistently been negative for acute infarct but MRI 2/12 shows small right cerebellar lacune. Currently max A bed mobility and unable to safely transfer, highly retropulsive. Pt not safe to return home, will need continued rehab at [facility]. Recommend neurology consult, as pt's condition appears to be worsening. Activity Tolerance: fair  Complexity: Moderate  Prognosis: fair, unclear neuro symptom etiology, declining function  Plan  days/week: 3+/ 1 week         Goals:  1. Pt will mobilize in bed sup<-> sit at min A   2.  Pt will sit<-> stand with LRAD at [facility]   3. Pt will stand at [facility] for 10 seconds with functional posture  4.  Pt will SPT to/from [facility] at [facility]       Treatment plan:  Bed mobility, transfers, balance, gait, TA, TX, device training, safety education, stairs    Recommendations for NURSING mobility: frequent turns,     Time:   Time in: 1349  Time out: 1409  Timed treatment minutes: 10  Total time: 20    Electronically signed by:    Berny Singh PT  2/23/2020, 3:34 PM

## 2020-02-24 NOTE — PROGRESS NOTES
Medical History:   Diagnosis Date    Acid reflux     Arthritis     \"Back\"    Back pain     \"Back Hurts Sometimes\"    CAD (coronary artery disease)     Sees Dr. Rohit Palmer Diabetes mellitus [facility]) Dx 2000's    \"Borderline\"    Hiatal hernia     Hx of blood clots 2001    \"Blood Clots Legs After Heart Surgery\"    Hx of migraines     HX OTHER MEDICAL     Primary Care Physician Is Dr. Shanta Leoneocco Hyperlipidemia     Hypertension     IBS (irritable bowel syndrome)     Right Breast Cancer 2005 Or 2006     Right Breast Mastectomy    Shortness of breath on exertion     UTI (urinary tract infection) In Past    No Current Symptoms    Wears dentures     Full Upper    Wears glasses     To Read     PSHX:  has a past surgical history that includes Cardiac surgery (2001); Dilatation, esophagus (\"3 Or 4\" Last Done 7-14); eye surgery (Bilateral, 2000's); Dental surgery; [place] tooth extraction; Appendectomy (Unsure When); Colonoscopy (\"Two\" Last Done 2000's); Cholecystectomy, laparoscopic (1990's Or 2000's); Breast surgery (Right, 2005 Or 2006); Inguinal hernia repair (Right, 1980's Or 1990's); hernia repair (1960's); Tubal ligation (1965); Tonsillectomy (1970's); Gastric fundoplication (7/7/37); hiatal hernia repair (2014); and Upper gastrointestinal endoscopy (N/A, 12/27/2019). Meds - list of home medications reviewed in electronic chart and confirmed  Allergies: No Known Allergies    FAM HX: family history includes Early Death in her brother; Early Death (age of onset: 28) in her father; Early Death (age of onset: 28) in her son; Early Death (age of onset: 40) in her brother; Heart Disease in her brother, father, and mother; Other in her daughter.   Soc HX:   Social History     Socioeconomic History    Marital status: Single     Spouse name: None    Number of children: None    Years of education: None    Highest education level: None   Occupational History    None   Social Needs    Financial resource strain: None    Food insecurity:     Worry: None     Inability: None    Transportation needs:     Medical: None     Non-medical: None   Tobacco Use    Smoking status: Former Smoker     Years: 7.00     Start date: 1971     Last attempt to quit: 1978     Years since quittin.5    Smokeless tobacco: Never Used   Substance and Sexual Activity    Alcohol use: No    Drug use: No     Comment: \"Smoked About 2 Or 3 Cigarettes A Day When Quit, I Didn't Ever Inhale\"    Sexual activity: Never   Lifestyle    Physical activity:     Days per week: None     Minutes per session: None    Stress: None   Relationships    Social connections:     Talks on phone: None     Gets together: None     Attends [employer]ism service: None     Active member of club or organization: None     Attends meetings of clubs or organizations: None     Relationship status: None    Intimate partner violence:     Fear of current or ex partner: None     Emotionally abused: None     Physically abused: None     Forced sexual activity: None   Other Topics Concern    None   Social History Narrative    None       ROS: a ten point ROS with pertinent positives and negatives in hpi, otherwise negative    EXAM:  Blood pressure (!) 142/67, pulse 80, temperature 97.3 °F (36.3 °C), temperature source Temporal, resp. rate 18, height 5' 5\" (1.651 m), weight 200 lb 1.6 oz (90.8 kg), SpO2 100 %.   Gen: ao nad  Heent: ncat  Lungs: ctabl  Car: nl s1s2  Abd: soft ntnd  Ext: LLE has no gross deformity; proximal muscle weakness with spacticity in the hands  Skin: warm+dry  Neuro: cn 2-12 grossly intact      LABS in ER reviewed    Xr Knee Left (3 Views)    Result Date: 2020  EXAMINATION: THREE XRAY VIEWS OF THE LEFT KNEE 2020 12:53 pm COMPARISON: 2019 HISTORY: ORDERING SYSTEM PROVIDED HISTORY: pain TECHNOLOGIST PROVIDED HISTORY: Reason for exam:->pain Reason for Exam: pain Acuity: Chronic Type of Exam: Unknown FINDINGS: Severe narrowing of the medial compartment of the knee is present with mild-to-moderate narrowing laterally. Moderate narrowing of the patellofemoral joint is also present with large hypertrophic bony spurs. Moderate to large size spurring is present along the medial joint line. No fracture, destructive bony lesion or joint effusion. No soft tissue swelling. Osteopenia. 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 No acute abnormality. No significant change from the prior study. Xr Tibia Fibula Left (2 Views)    Result Date: 2/20/2020  EXAMINATION: 2 XRAY VIEWS OF THE LEFT TIBIA AND FIBULA 2/20/2020 6:41 pm COMPARISON: None HISTORY: ORDERING SYSTEM PROVIDED HISTORY: leg pain TECHNOLOGIST PROVIDED HISTORY: Reason for exam:->leg pain Reason for Exam: pain Acuity: Acute Type of Exam: Initial Additional signs and symptoms: lower leg pain and mild swelling FINDINGS: Two views of the left tibia and fibula demonstrate an oblique acute to subacute fracture of the distal fibula. Minimal displacement is seen. Severe degenerative changes of the left knee are present. No acute soft tissue abnormality. Multiple surgical clips project in the soft tissues. 1. Acute to subacute fracture of the distal fibula. Minimal displacement is present. 2. Severe degenerative changes of the left knee. 3. No acute soft tissue abnormality identified. Ct Head Wo Contrast    Result Date: 2/11/2020  EXAMINATION: CT OF THE HEAD WITHOUT CONTRAST  2/11/2020 12:50 pm TECHNIQUE: CT of the head was performed without the administration of intravenous contrast. Dose modulation, iterative reconstruction, and/or weight based adjustment of the mA/kV was utilized to reduce the radiation dose to as low as reasonably achievable. COMPARISON: 01/13/2020.  HISTORY: ORDERING SYSTEM PROVIDED HISTORY: abnormal contractures TECHNOLOGIST PROVIDED HISTORY: Reason for exam:->abnormal contractures Has a \"code stroke\" or \"stroke alert\" been called? ->No Reason for Exam: abnormal contractures Acuity: Acute Type of Exam: Initial Additional signs and symptoms: none Relevant Medical/Surgical History: poor historian FINDINGS: BRAIN/VENTRICLES: There is no acute intracranial hemorrhage, mass effect or midline shift. No abnormal extra-axial fluid collection. The gray-white differentiation is maintained without evidence of an acute infarct. There is no evidence of hydrocephalus. The cerebral sulci and ventricles are enlarged. There is low-attenuation within the periventricular white matter deep white matter of the brain. 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 ORBITS: The visualized portion of the orbits demonstrate no acute abnormality. SINUSES: The visualized paranasal sinuses and mastoid air cells demonstrate no acute abnormality. SOFT TISSUES/SKULL:  No acute abnormality of the visualized skull or soft tissues. 1. No acute intracranial abnormality. 2. Diffuse cerebral atrophy with chronic small vessel ischemic disease. Mri Cervical Spine Wo Contrast    Result Date: 2/19/2020  EXAMINATION: MRI OF THE CERVICAL SPINE WITHOUT CONTRAST 2/19/2020 9:02 am TECHNIQUE: Multiplanar multisequence MRI of the cervical spine was performed without the administration of intravenous contrast. COMPARISON: None HISTORY: ORDERING SYSTEM PROVIDED HISTORY: radiculopathy TECHNOLOGIST PROVIDED HISTORY: Reason for exam:->radiculopathy Reason for Exam: Recent fall, ataxia. Pain Acuity: Acute Type of Exam: Initial Additional signs and symptoms: No HX of CA or surgery, Pt was unable to hold still throughout exam. FINDINGS: Multiple sequences are degraded by motion. BONES/ALIGNMENT: Normal alignment of the spine. Vertebral body heights are maintained. No concerning marrow signal abnormality. Spinal canal is congenitally narrow. SPINAL CORD: No abnormal cord signal is seen.  SOFT TISSUES: Paraspinal soft tissues are normal.  No prevertebral edema. C2-C3: Posterior disc osteophyte complex and bilateral uncovertebral and facet joint degenerative changes. No spinal canal or neural foraminal stenosis. C3-C4: Posterior disc osteophyte complex and bilateral uncovertebral and facet joint degenerative changes. Mild spinal canal stenosis. Severe right and mild left neural foraminal stenosis. C4-C5: Posterior disc osteophyte complex and bilateral uncovertebral and facet joint degenerative changes. Moderate spinal canal stenosis. Severe bilateral neural foraminal stenosis. C5-C6: Posterior disc osteophyte complex and bilateral uncovertebral and facet joint degenerative changes. Mild spinal canal stenosis. Severe right and mild left neural foraminal stenosis. C6-C7: Posterior disc osteophyte complex and bilateral uncovertebral and facet joint degenerative changes. No spinal canal or neural foraminal stenosis. C7-T1: No disc bulge or protrusion. Mild facet joint degenerative changes. No spinal canal or neural foraminal stenosis. Motion degraded study. Multilevel degenerative changes of the cervical spine, superimposed on a congenitally narrow canal.  There is moderate spinal canal stenosis at C4-C5 and mild spinal canal stenosis at C3-C4 and C5-C6. Multilevel neural foraminal narrowing, including severe right neural foraminal stenosis from C3-C4 through C5-C6.      Mri Lumbar Spine Wo Contrast    Result Date: 2/19/2020  EXAMINATION: MRI OF THE LUMBAR SPINE WITHOUT CONTRAST, 2/19/2020 9:02 am TECHNIQUE: Multiplanar multisequence MRI of the lumbar spine was performed without the administration of intravenous contrast. COMPARISON: None HISTORY: ORDERING SYSTEM PROVIDED HISTORY: radiculopathy TECHNOLOGIST PROVIDED HISTORY: Reason for exam:->radiculopathy Reason for Exam: recent fall, ataxia pain Acuity: Acute Type of Exam: Initial Additional signs and symptoms: No HX of CA or surgery, Pt was unable to hold still throughout exam. FINDINGS: ______________________________________________________________    Electronically signed by Kesha Parks MD on 2/21/2020 at 10:02 AM

## 2020-02-24 NOTE — PROGRESS NOTES
Occupational Therapy  [facility] OCCUPATIONAL THERAPY EVALUATION    History  [place]:  There were no encounter diagnoses. Restrictions:       Position Activity Restriction  Other position/activity restrictions: WBAT LLE with boot                   Communication with other providers: RN    Subjective:  Patient states:  \"i'm tired\"  Pain:  6/10 LLE  Patient goal:  no    Occupational profile (relevant social history and personal factors):    Social/Functional History  Lives With: Daughter  Type of Home: Apartment  Home Access: Level entry  Bathroom Shower/Tub: Tub/Shower unit, Shower chair with back  Bathroom Toilet: Standard  Bathroom Equipment: 3-in-1 commode  Home Equipment: [place], [address] Help From: Family  ADL Assistance: [address] [place] [address] Avenue: Independent  Homemaking Responsibilities: Yes  Additional Comments: dtr's describe continuous decline since admission at [facility]    Examination of body systems (includes body structures/functions, activity/participation limitations):  · Orientation: WFL   · Cognition:  Delayed processing, reduced conversationalist. But she follows commands appropriately. · Observation:  Received pt in bed. Alert and cooperative. Wearing sunglasses. · Vision:  [employer] [facility]   · Hearing:  Mild hardness of hearing. · ROM:  WFL BUE  · Strength: 4-/5 BUE  · Sensation: not tested  · Coordination: reduced GMC and FMC of LUE    ADLs  Feeding: Miroslava    Grooming: Miroslava    Dressing: UB ModA in seated LB DEP    Bathing: UB ModA in seated LB DEP    Toileting: DEP, bed level. *Some ADL determined per observation of actual ADL performance, functional mobility, balance, activity tolerance, and cognition.      AM-PAC 6 click short form for inpatient daily activity:  Raw Score: 11  24/24 = unimpaired  23/24 = 1-20% impaired   20/24-22/24 = 21-40% impaired  15/24-19/24 = 41-59% impaired   10/24-14/24 = 60%-79% impaired  7/24-9/24 = 80%-99% impaired  6/24 = 100% impaired    Functional Mobility  Bed mobility:   Supine to sit: Mi[place]lava for trunk upright with tactile cues for effective hand placement after pt slid LEs to EOB  Sit to supine: 2 person    Sitting balance: close Supervision ; no trunk instability    Transfers: deferred/MICHAEL    Standing balance: MICHAEL    Ambulation:  MICHAEL     Activity tolerance  WFL for limited activity    Assessment:  Assessment  Performance deficits / Impairments: Decreased functional mobility , Decreased ADL status, Decreased ROM, Decreased strength, Decreased safe awareness, Decreased balance, Decreased sensation, Decreased endurance, Decreased cognition, Decreased high-level IADLs, Decreased coordination, Decreased posture  Prognosis: Fair  Decision Making: High Complexity  REQUIRES OT FOLLOW UP: Yes  Discharge Recommendations: [address]    Pt is a 67year old F admitted from [facility] c LLE pain and debility. Found to have nondisplaced L fibula fracture, seen by ortho, WBAT with boot. Has been admitted to hospital or SNF for approximately 1 month and has been unable to regain her PLOF. She will need continued OT during LOS and at d/c to maximize function and reduce caregiver burden. She cannot return home at this time. Goals:  By d/c or goals met:     Pt will perform all bed mobility with Miroslava in prep for EOB/OOB activity. Pt will perform UB ADLs with Miroslava to increase functional independence. Pt will sit EOB c supervision 10+ ins for ADL and therax/therex. Pt will participate in therex/therax c emphasis on strength, activity tolerance,  safety, SINDHU tasks. Plan:  Plan  Times per week: 1x      Recommendation for activity with nursing staff:  ashley    Treatment today:    Therapeutic Exercise:  Cues were given for technique, safety, recruitment, and rationale. Cues were verbal and/or tactile.  Sat EOB ~10 mins for UB therex c instruction for finger lacing to assist in improved control of LUE    Education: Role of OT, OT POC, d/c needs, home safety    Safety: Left in bed with all needs in reach and bed alarm activated. Gait belt used for transfer and mobility. Time in:  1035  Time out:  1100  Timed treatment minutes:  10  Total treatment time:  25    Electronically signed by:    [address], [place], [place]   [id number]   12:24 PM, 2/24/2020

## 2020-02-24 NOTE — PROGRESS NOTES
INTERNAL MEDICINE PROGRESS NOTE        Kerry Books   1940   Primary Care Physician:  Katerine Banks MD  Admit Date: 2/21/2020     Subjective:   Pt is doing better today. Denies chest pain, SOB, nausea, vomiting, abdominal pain. Remainder of ROS is unremarkable. Meds, labs and other notes reviewed. Objective:   BP (!) 122/56 Comment: notified RN Gabi  Pulse 69   Temp 98 °F (36.7 °C) (Oral)   Resp 14   Ht 5' 5\" (1.651 m)   Wt 203 lb 8 oz (92.3 kg)   SpO2 96%   BMI 33.86 kg/m²    Recent Labs     02/23/20  0740 02/23/20  1148 02/23/20  1641 02/23/20  2106   POCGLU 95 123* 117* 107*       No intake/output data recorded. No intake/output data recorded. Neck: no adenopathy and supple, symmetrical, trachea midline  Lungs: clear to auscultation bilaterally  Heart: regular rate and rhythm and S1, S2 normal  Abdomen: soft, non-tender; bowel sounds normal; no masses,  no organomegaly  Extremities: extremities normal, atraumatic, no cyanosis or edema  Neurologic: Grossly normal    Data Review  CBC with Differential:    Recent Labs     02/21/20 2032   WBC 9.3   RBC 3.11*   HGB 9.6*   HCT 31.6*      .6*   MCH 30.9   MCHC 30.4*   RDW 16.9*     CMP:    Recent Labs     02/21/20 2032      K 3.9      CO2 19*   BUN 19   CREATININE 1.2*   GFRAA 53*   LABGLOM 43*   GLUCOSE 134*   PROT 6.9   LABALBU 3.7   CALCIUM 9.2   BILITOT 0.4   ALKPHOS 108   AST 40*   ALT 12     PT/INR:  No results for input(s): PROTIME, INR in the last 72 hours.   Meds:    carbidopa-levodopa  1 tablet Oral TID    folic acid  1 mg Oral Daily    enoxaparin  30 mg Subcutaneous Daily    metoprolol  100 mg Oral BID    pantoprazole  40 mg Oral QAM AC    lisinopril  2.5 mg Oral Daily    insulin lispro  0-6 Units Subcutaneous TID WC    insulin lispro  0-3 Units Subcutaneous Nightly     PRN Meds: melatonin, magnesium hydroxide, acetaminophen, ondansetron, sodium chloride, glucose, dextrose, glucagon (rDNA),

## 2020-02-24 NOTE — PROGRESS NOTES
Physical Therapy  . Physical Therapy Treatment Note  Name: Yovani Way MRN: [id number] :   1940   Date:  2020   Admission Date: 2020 Room:  [id number]     Restrictions/Precautions:        General Precautions, Fall Risk     Communication with other providers:  NELIDA Bryne cleared patient for physical therapy. Subjective:  Patient states:  Agreeable to therapy. Pain:   Location, Type, Intensity (0/10 to 10/10): Denies pain. Objective:    Observation:  Patient semi fowlers in bed upon therapist arrival. Tele. Visitors present. Treatment, including education/measures:  Therapeutic Exercise:  Therapeutic exercises were instructed today. Instructions were given for technique, safety, recruitment, and rationale. Instructions were verbal and/or tactile. Supine: Ankle pumps 2 sets of 10  Heel slides 2 sets of 10  Hip abduction / adduction 2 sets of 10  Straight leg raises 2 sets of 10  Quad sets 2 sets of 10 with 5 second hold  Glut sets 2 sets of 10 with 5 second hold    Safety  Patient left safely in the bed, with call light/phone in reach with alarm applied. Gait belt was used for transfers and gait.       Assessment / Impression:    Patient's tolerance of treatment:  well   Adverse Reaction: none  Significant change in status and impact:  none  Barriers to improvement:  n/a  Discharge plan: pending     Plan for Next Session:    Per POC    Time in:  1345  Time out:  1408  Timed treatment minutes:  23  Total treatment time:  23    Previously filed items:  Social/Functional History  Lives With: Daughter  Type of Home: Apartment  Home Access: Level entry  Bathroom Shower/Tub: Tub/Shower unit, Shower chair with back  Bathroom Toilet: Standard  Bathroom Equipment: 3-in-1 commode  Home Equipment: [employer], [address] Help From: Family  ADL Assistance: [address]: Independent  Homemaking Responsibilities: Yes  Additional Comments: dtr's describe

## 2020-02-24 NOTE — CARE COORDINATION
Spoke with pt/daughter, they are interested in pt returning to Swing bed at [facility]. Referral called to [facility].

## 2020-02-25 NOTE — PROGRESS NOTES
Report called to [facility] Bed. All questions answered. Transport to  at [address] for transfer to [place]. Daughters in room, aware of transfer.

## 2020-02-25 NOTE — PROGRESS NOTES
Occupational Therapy  . Occupational Therapy Treatment Note  Name: Matthew Chauhankeeper MRN: [id number] :   1940   Date:  2020   Admission Date: 2020 Room:  [address] [place] [address]-     Restrictions/Precautions:    General precautions; Fall Risk; LLE WBAT c boot    Communication with other providers:  NELIDA Villarreal cleared pt for OT Tx session. Subjective:  Patient states: Pt agreeable to OT Tx session. Pain:   Location, Type, Intensity (0/10 to 10/10):  0/10, denies pain at rest    Objective:    Observation:  Pt received in semi-fowlers in bed. Pt exhibits weakness and decreased coordination in BUE c LUE > RUE for deficits. Pt exhibits trunk strength for sitting erect in bed w/o support for intermittent periods, ~1-2 minutes each time. Objective Measures:  RUE saline lock. Treatment, including education:    Therapeutic Activity Training:   Pt educated for role of OT and rationale for therapeutic intervention. Pt educated for benefits of therapeutic exercises to increase strength and endurance in preparation for functional transfers and verbalized understanding. Pt required assistance for dialing phone to reach daughter for brief phone call and required intermittent Min A assistance to maintain phone at ear level. Pt exhibits significant weakness and coordination c BUE, denies having this always but unable to identify how long these deficits have existed. Therapeutic Exercise:  Cues were given for technique, safety, recruitment, and rationale. Cues were verbal and/or tactile. Pt Min A to SBA + vc's for technique to perform BUE AAROM to AROM exercises to include: shoulder flex/extension, internal/external rotation, chest presses, bicep curls, rowing, and SBA + cues for supination/pronation x10 reps each, requires rest breaks between each set.  Pt required intermittent Min A for LUE losing grasp on rolled towel which was utilized for first three exercises to facilitate ROM coordination during first three exercises. Safety  Patient safely in bed + alarm at end of session, with call light/phone in reach, and nursing aware. Assessment / Impression:  Pt appears to be limited for functional tasks and bed mobility by BUE weakness. Recommend co-treat for attempt to sit EOB. Patient's tolerance of treatment:  Well   Adverse Reaction: None  Significant change in status and impact:  None  Barriers to improvement:  Decreased strength, generalized deconditioning    Plan for Next Session:    Continue per OT POC. Plan to approach c co-treat for EOB activity. Time in:  1008  Time out:  1048  Timed treatment minutes:  40  Total treatment time:  40    Electronically signed by:    HAM Izaguirre  2/25/2020, 9:32 AM    Previously filed values:    Goals:  By d/c or goals met:  Pt will perform all bed mobility with Miroslava in prep for EOB/OOB activity. Pt will perform UB ADLs with Miroslava to increase functional independence. Pt will sit EOB c supervision 10+ ins for ADL and therax/therex.    Pt will participate in therex/therax c emphasis on strength, activity tolerance,  safety, SINDHU tasks.

## 2020-02-25 NOTE — CARE COORDINATION
Update: Patient ok to come to Swing bed for SNF per Therapy Manager. Instructed best efforts would be given to rehab. TS     Patient discussed during 2020 huddle, decided neuro consult would be best prior to coming to [facility] bed program.  Waiting on note and direction. Will update CM once a decision is made. If it is decided SNF is the best location for this patient, Swing bed can accept patient.   TS     SWING BED PROGRAM PRE-ADMISSION ASSESSMENT    Patient Name: Lupe Lou      : 1940  (67 y.o.) Gender: female   Expected Discharge>SWING Date: TBD   MRN: [id number]      Inpatient Admission Date: 2020 Date & Time of Referral: 2020          Referred By: Jose Garcia MD Referred from:  [] Nadine   [x] Other: [facility]      # of Skilled Care Days Used in Last 60 days: 0 Insurance: []  Medicare                      []  Secondary - Type:     Present Condition/Diagnosis:    Closed fracture of shaft of left fibula, initial encounter [S89.027A]      Previous Medical History:   Past Medical History:   Diagnosis Date    Acid reflux     Arthritis     \"Back\"    Back pain     \"Back Hurts Sometimes\"    CAD (coronary artery disease)     Sees Dr. Gilberto Fuentes [facility] Dx 's    \"Borderline\"    Hiatal hernia     Hx of blood clots     \"Blood Clots Legs After Heart Surgery\"    Hx of migraines     HX OTHER MEDICAL     Primary Care Physician Is Dr. Limon Slight Hyperlipidemia     Hypertension     IBS (irritable bowel syndrome)     Right Breast Cancer  Or      Right Breast Mastectomy    Shortness of breath on exertion     UTI (urinary tract infection) In Past    No Current Symptoms    Wears dentures     Full Upper    Wears glasses     To Read            Discharging CM: [id number] Unit/Room#: 5385/7683-P  Discharging CM Phone Number:     Nurse Assessment:  Last Vital Signs: /61   Pulse 76   Temp 97.9 °F (36.6 °C) (Oral)   Resp 18 Ht 5' 5\" (1.651 m)   Wt 202 lb (91.6 kg)   SpO2 100%   BMI 33.61 kg/m²     Last documented pain score (0-10 scale): Pain Level: 0  Last Weight:   Wt Readings from Last 1 Encounters:   02/25/20 202 lb (91.6 kg)       Scheduled Off-site Appointments: see discharge Summary      IV Access:  - Peripheral IV - site  R Cephalic, insertion date: 02/22/2020    Drains/Lines/Tubes: Catheter 02/24/2020    Wound Care Documentation and Therapy:  Incision 08/01/14 Abdomen Mid;Right;Left (Active)   Number of days: 2034       Wound 02/23/20 Buttocks white area on buttocks possible old scars, there is one almost open area to buttocks (Active)   Wound Pressure Unstageable 2/23/2020  9:18 AM   Dressing Status Clean;Dry; Intact 2/25/2020  9:27 AM   Dressing Changed Dressing reinforced 2/25/2020  9:27 AM   Dressing/Treatment Other (comment) 2/25/2020  9:27 AM   Wound Cleansed Soap and water 2/23/2020  9:18 AM   Wound Assessment Clean;Dry; Intact 2/25/2020  9:27 AM   Odor None 2/25/2020  9:27 AM   Number of days: 2       Wound Vac Required:      Urinary Catheter: Insertion Date: 02/24/2020   Colostomy/Ileostomy/Ileal Conduit: No         Treatments at the Time of Hospital Discharge:   Respiratory Treatments:   Oxygen Therapy:  [facility]      Rehab Therapies:    Weight Bearing Status/Restrictions: No weight bearing restirctions  Other Medical Equipment (for information only, NOT a DME order): Other Treatments:     Requires Dialysis:  Dialysis Facility (if applicable)   · Name:  · Address:  · Dialysis Schedule:  · Phone:  · Fax:    COGNITIVE/BEHAVIORAL  Change in cognitive status in last 90 days:  x( )no change  ( ) improved  ( ) deteriorated  Behavioral changes in last seven days:  ( ) wandering  ( ) verbally abusive  ( )phys.  Abusive                                                                         ( ) socially inappropriate  ( ) resists care  ADL Performance Last Seven (7) Days (Please Score)   Patients performance over all shifts during last seven (7) days   0 = Independent - No help or oversight  1 = Supervision - Oversight, encouragement or cueing provided  2 = Limited Assist - Receives physical help in guided maneuvering of limbs or other non-weight bearing assistance  3 = Extensive Assist - Patient performs part of the activity, weight bearing support was provided  4 = Dependence = Full staff performance of activity *NOTE: USE THE FOLLOWING SCORING FOR EATING ONLY:  1 = Supervision or Independent  2 = Limited Assist  3 = Dependent or Extensive Assist     SCORE   BED MOBILITY - How client moves to and from lying position, turns side to side, and positions body while in bed 3   TRANSFER - How resident moves between surfaces - to/from: bed, chair, wheelchair, standing position (EXCLUDE to/from bath/toilet) 4   TOILET USE - How client uses the toilet room (or commode, bedpan, urinal);transfers on/off toilet, cleanses, changes pad, manages ostomy or catheter, adjusts clothes 4   EATING (SCORE 1-3 ONLY*) - How client eats and drinks (regardless of skill).  Includes intake of nourishment by other means (e.g., tube feeding, total parenteral nutrition) 1   Estimated Pre-Admission ADL Value: 12     PATIENT WILL RECEIVE THE FOLLOWING SKILLED SERVICES AS A SWING BED PATIENT:       REHABILITATION (PT/OT/SP)    [] ULTRA HIGH 720 or more minutes minimum per week of at least two (2) disciplines - 1st for at least five (5) days and 2nd for at least three (3) days   [] VERY HIGH 500 or more minutes minimum per week of at least one (1) discipline for at least five (5) days   [x] HIGH 325 or more minutes minimum per week of at least one (1) discipline for at least five (5) days   [] MEDIUM 150 or more minutes minimum per week at least five (5) days of any combination with three (3) therapies   [] LOW Restorative nursing at least six (6) days, two (2) activities, or therapies for at least three (3) days at least forty-five (45) minute per week minimum services. EXTENSIVE SERVICES   [] Tracheostomy Care   [] Ventilator/Respirator   [] Infection Isolation   SPECIAL CARE HIGH   [] MS with ADL greater than or equal to 10  [] Quadriplegic with ADL greater than or equal to 5  [] emphysema/COPD and shortness of breath when lying flat  [] Fever w/at least one (1) of the following: [] dehydration [] pneumonia [] vomiting [] weight loss  [] feeding tube  [] Septicemia  [] Coma (not awake & completely dependent in ADL)  [] Diabetes and injections seven (7) days and Dr. order change two (2) or more days.   [] Parenteral/IV feeding  [] respiratory therapy for seven (7) days   SPECIAL CARE LOW:   [] Respiratory Therapy  [] Ulcers (2+sites all stages) w/treatment  [] Multiple Sclerosis  [] Cerebral Palsy  [] Parkinsons Disease  [] Oxygen Therapy  [] Extensive care services w/ADL less than 6  [] Fever w/at least one (1) of the following: [] dehydration [] pneumonia [] vomiting [] weight loss  [] Foot Infection or open lesions on the foot with treatment  [] tube-feeding - calories greater than or equal to 51% or tube feeding with total calories greater than or equal to 26% and fluid parental or enteral intake of greater than or equal to 50 ml per day   CLINICALLY COMPLEX   CURRENTLY:  [] Pneumonia  [] Hemiplegics with ADL with ADL Sum greater than or equal to 10  [] IV medications delivered post admission in the SNF  [] Surgical wounds or open lesions w/treatment      EVALUATORS SIGNATURE:Helen Julien DATE: 2/25/2020       [x] ACCEPTED FOR ADMISSION ON:  Pending Physician review                                ELOS:  [] 1-2wks  [] 2-3wks  [] 3-4wks   [] ADMISSION DENIED BASED ON:    [] [facility] Kaleb COORDINATOR

## 2020-02-25 NOTE — DISCHARGE SUMMARY
William Quiroga  Discharge Summary     Patient ID  Freddy Proctor   1940  [id number]          Admit date: 2/21/2020   Discharge date: 2/25/2020      Admitting Physician: Neil Byrne MD   Discharge Physician: Neil Byrne MD    Discharge Diagnoses:     1.  Acute to subacute closed fracture of the distal fibula with minimum  Displacement. Fracture boot to left lower ext. 2.  Generalized weakness with recurrent falls, unclear etiology. PT/OT. Recent neuro workup has been essentially unremarkable. 3.  Hypertension. On Lisinopril. 4.  Hyperlipidemia. 5.  Diabetes mellitus. On SSI coverage. 6.  Coronary artery disease. Stable. 7.  Chronic kidney disease. Cret is stable. 8.  Obesity. 9.  Gastritis and duodenitis. On Protonix. Patient Active Problem List   Diagnosis    Hypertension    Diabetes mellitus ([address]    Hyperlipidemia    Acid reflux    Chest pain    CAD (coronary artery disease)    RUSTY [facility]) [facility]    Lower abdominal pain    Intractable nausea and vomiting    Duodenitis    Recurrent falls    Elevated troponin    Generalized weakness    Severe malnutrition (HCC)    Closed fracture of shaft of left fibula, initial encounter    Closed displaced comminuted fracture of shaft of left fibula     Discharged Condition: {condition:53316}    Hospital Course: ***    Consults:     {consultation:94338}     Significant Diagnostic Studies:   Xr Knee Left (3 Views)    Result Date: 2/11/2020  EXAMINATION: THREE XRAY VIEWS OF THE LEFT KNEE 2/11/2020 12:53 pm COMPARISON: 06/17/2019 HISTORY: ORDERING SYSTEM PROVIDED HISTORY: pain TECHNOLOGIST PROVIDED HISTORY: Reason for exam:->pain Reason for Exam: pain Acuity: Chronic Type of Exam: Unknown FINDINGS: Severe narrowing of the medial compartment of the knee is present with mild-to-moderate narrowing laterally. Moderate narrowing of the patellofemoral joint is also present with large hypertrophic bony spurs.  Moderate to large size spurring is present along the medial joint line. No fracture, destructive bony lesion or joint effusion. No soft tissue swelling. Osteopenia. 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 No acute abnormality. No significant change from the prior study. Xr Tibia Fibula Left (2 Views)    Result Date: 2/21/2020  EXAMINATION: 2 XRAY VIEWS OF THE LEFT TIBIA AND FIBULA 2/20/2020 6:41 pm COMPARISON: None HISTORY: ORDERING SYSTEM PROVIDED HISTORY: leg pain TECHNOLOGIST PROVIDED HISTORY: Reason for exam:->leg pain Reason for Exam: pain Acuity: Acute Type of Exam: Initial Additional signs and symptoms: lower leg pain and mild swelling FINDINGS: Two views of the left tibia and fibula demonstrate an oblique acute to subacute fracture of the distal fibula. Minimal displacement is seen. Severe degenerative changes of the left knee are present. No acute soft tissue abnormality. Multiple surgical clips project in the soft tissues. 1. Acute to subacute fracture of the distal fibula. Minimal displacement is present. 2. Severe degenerative changes of the left knee. 3. No acute soft tissue abnormality identified. Ct Head Wo Contrast    Result Date: 2/11/2020  EXAMINATION: CT OF THE HEAD WITHOUT CONTRAST  2/11/2020 12:50 pm TECHNIQUE: CT of the head was performed without the administration of intravenous contrast. Dose modulation, iterative reconstruction, and/or weight based adjustment of the mA/kV was utilized to reduce the radiation dose to as low as reasonably achievable. COMPARISON: 01/13/2020. HISTORY: ORDERING SYSTEM PROVIDED HISTORY: abnormal contractures TECHNOLOGIST PROVIDED HISTORY: Reason for exam:->abnormal contractures Has a \"code stroke\" or \"stroke alert\" been called? ->No Reason for Exam: abnormal contractures Acuity: Acute Type of Exam: Initial Additional signs and symptoms: none Relevant Medical/Surgical History: poor historian FINDINGS: BRAIN/VENTRICLES: There is no acute intracranial hemorrhage, mass effect or midline shift. No abnormal extra-axial fluid collection. The gray-white differentiation is maintained without evidence of an acute infarct. There is no evidence of hydrocephalus. The cerebral sulci and ventricles are enlarged. There is low-attenuation within the periventricular white matter deep white matter of the brain. 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 ORBITS: The visualized portion of the orbits demonstrate no acute abnormality. SINUSES: The visualized paranasal sinuses and mastoid air cells demonstrate no acute abnormality. SOFT TISSUES/SKULL:  No acute abnormality of the visualized skull or soft tissues. 1. No acute intracranial abnormality. 2. Diffuse cerebral atrophy with chronic small vessel ischemic disease. Mri Cervical Spine Wo Contrast    Result Date: 2/21/2020  EXAMINATION: MRI OF THE CERVICAL SPINE WITHOUT CONTRAST 2/19/2020 9:02 am TECHNIQUE: Multiplanar multisequence MRI of the cervical spine was performed without the administration of intravenous contrast. COMPARISON: None HISTORY: ORDERING SYSTEM PROVIDED HISTORY: radiculopathy TECHNOLOGIST PROVIDED HISTORY: Reason for exam:->radiculopathy Reason for Exam: Recent fall, ataxia. Pain Acuity: Acute Type of Exam: Initial Additional signs and symptoms: No HX of CA or surgery, Pt was unable to hold still throughout exam. FINDINGS: Multiple sequences are degraded by motion. BONES/ALIGNMENT: Normal alignment of the spine. Vertebral body heights are maintained. No concerning marrow signal abnormality. Spinal canal is congenitally narrow. SPINAL CORD: No abnormal cord signal is seen. SOFT TISSUES: Paraspinal soft tissues are normal.  No prevertebral edema. C2-C3: Posterior disc osteophyte complex and bilateral uncovertebral and facet joint degenerative changes. No spinal canal or neural foraminal stenosis.  C3-C4: Posterior disc osteophyte complex and bilateral uncovertebral and facet joint degenerative changes. Mild spinal canal stenosis. Severe right and mild left neural foraminal stenosis. C4-C5: Posterior disc osteophyte complex and bilateral uncovertebral and facet joint degenerative changes. Moderate spinal canal stenosis. Severe bilateral neural foraminal stenosis. C5-C6: Posterior disc osteophyte complex and bilateral uncovertebral and facet joint degenerative changes. Mild spinal canal stenosis. Severe right and mild left neural foraminal stenosis. C6-C7: Posterior disc osteophyte complex and bilateral uncovertebral and facet joint degenerative changes. No spinal canal or neural foraminal stenosis. C7-T1: No disc bulge or protrusion. Mild facet joint degenerative changes. No spinal canal or neural foraminal stenosis. Motion degraded study. Multilevel degenerative changes of the cervical spine, superimposed on a congenitally narrow canal.  There is moderate spinal canal stenosis at C4-C5 and mild spinal canal stenosis at C3-C4 and C5-C6. Multilevel neural foraminal narrowing, including severe right neural foraminal stenosis from C3-C4 through C5-C6. Mri Lumbar Spine Wo Contrast    Result Date: 2/21/2020  EXAMINATION: MRI OF THE LUMBAR SPINE WITHOUT CONTRAST, 2/19/2020 9:02 am TECHNIQUE: Multiplanar multisequence MRI of the lumbar spine was performed without the administration of intravenous contrast. COMPARISON: None HISTORY: ORDERING SYSTEM PROVIDED HISTORY: radiculopathy TECHNOLOGIST PROVIDED HISTORY: Reason for exam:->radiculopathy Reason for Exam: recent fall, ataxia pain Acuity: Acute Type of Exam: Initial Additional signs and symptoms: No HX of CA or surgery, Pt was unable to hold still throughout exam. FINDINGS: Multiple sequences are degraded by motion. BONES/ALIGNMENT: There is transitional anatomy at the lumbosacral junction.  For the purposes of this dictation, there is a partially formed L5-S1 intervertebral disc and partial sacralization of L5 on the left. 2 mm grade 1 anterolisthesis of L4 on L5. Vertebral body heights are maintained. No concerning marrow signal abnormality. SPINAL CORD: Conus terminates above the L1-L2 level. Distal cord and conus medullaris are normal in signal intensity and morphology. SOFT TISSUES: No acute paraspinal soft tissue abnormality. T12-L1: Disc bulge and mild facet joint degenerative changes. No spinal canal neural foraminal stenosis. L1-L2: No disc bulge or protrusion. Mild facet joint degenerative changes. No spinal canal stenosis. Mild bilateral neural foraminal stenosis. L2-L3: Disc bulge and bilateral facet joint degenerative changes with prominence of the ligamentum flavum. Moderate spinal canal stenosis. Mild bilateral neural foraminal stenosis. L3-L4: Bulge and bilateral facet joint degenerative changes. Severe spinal canal stenosis. Moderate right and mild left neural foraminal stenosis. L4-L5: Anterolisthesis with uncovering of a disc bulge. Bilateral facet joint degenerative changes. Moderate spinal canal stenosis. Mild bilateral neural foraminal stenosis. L5-S1: No disc bulge or protrusion. No spinal canal or neural foraminal stenosis. Motion degraded study. Multilevel degenerative changes of the lumbar spine. There is severe spinal canal stenosis at L3-L4. There is moderate spinal canal stenosis at L2-L3 and L4-L5. Multilevel neural foraminal narrowing, including moderate right neural foraminal stenosis at L3-L4.      Mri Brain W Wo Contrast    Result Date: 2/13/2020  EXAMINATION: MRI OF THE BRAIN WITHOUT AND WITH CONTRAST  2/13/2020 3:10 pm TECHNIQUE: Multiplanar multisequence MRI of the head/brain was performed without and with the administration of intravenous contrast. COMPARISON: 01/14/2020 HISTORY: ORDERING SYSTEM PROVIDED HISTORY: left cerebellar ataxia LUE TECHNOLOGIST PROVIDED HISTORY: Reason for exam:->left cerebellar ataxia LUE Reason for Exam: ataxia, LUE weakness, 10 mL multihance Acuity: Acute Type of Exam: Initial FINDINGS: INTRACRANIAL STRUCTURES/VENTRICLES:  There is no acute infarct. No mass effect or midline shift. No evidence of an acute intracranial hemorrhage. The ventricles and sulci are normal in size and configuration. The sellar/suprasellar regions appear unremarkable. The normal signal voids within the major intracranial vessels appear maintained. No abnormal focus of enhancement is seen within the brain. Moderate chronic small vessel ischemic disease is identified within the periventricular white matter. Mild cerebral atrophy is appreciated. Additional chronic microvascular disease is identified within the pete. Tiny old right cerebellar lacune is identified. ORBITS: The visualized portion of the orbits demonstrate no acute abnormality. SINUSES: The visualized paranasal sinuses and mastoid air cells are well aerated. BONES/SOFT TISSUES: The bone marrow signal intensity appears normal. The soft tissues demonstrate no acute abnormality. No evidence of acute intracranial abnormality. Stable moderate chronic microvascular disease within the periventricular white matter with associated mild atrophy. Tiny old right cerebellar lacune.  No abnormal enhancement within the brain.   -    Recent Results (from the past 24 hour(s))   POCT Glucose    Collection Time: 02/24/20  4:56 PM   Result Value Ref Range    POC Glucose 109 (H) 70 - 99 MG/DL   POCT Glucose    Collection Time: 02/24/20  9:34 PM   Result Value Ref Range    POC Glucose 95 70 - 99 MG/DL   Basic Metabolic Panel    Collection Time: 02/25/20  5:37 AM   Result Value Ref Range    Sodium 139 135 - 145 MMOL/L    Potassium 3.8 3.5 - 5.1 MMOL/L    Chloride 104 99 - 110 mMol/L    CO2 21 21 - 32 MMOL/L    Anion Gap 14 4 - 16    BUN 17 6 - 23 MG/DL    CREATININE 1.2 (H) 0.6 - 1.1 MG/DL    Glucose 118 (H) 70 - 99 MG/DL    Calcium 8.8 8.3 - 10.6 MG/DL    GFR Non-African American 43 (L) >60 mL/min/1.73m2    GFR  53 (L) >60 mL/min/1.73m2   POCT Glucose    Collection Time: 20  7:58 AM   Result Value Ref Range    POC Glucose 137 (H) 70 - 99 MG/DL   POCT Glucose    Collection Time: 20 11:33 AM   Result Value Ref Range    POC Glucose 134 (H) 70 - 99 MG/DL        Disposition: {disposition:79818}    Patient Instructions:    [address] Medication Instructions IG    Printed on:20 123   Medication Information                      atorvastatin (LIPITOR) 80 MG tablet  TAKE 1 TABLET BY MOUTH ONE TIME A DAY              Cyanocobalamin (VITAMIN B 12 PO)  Take 1 tablet by mouth daily             gemfibrozil (LOPID) 600 MG tablet  Take 600 mg by mouth 2 times daily             lisinopril (PRINIVIL;ZESTRIL) 2.5 MG tablet  TAKE 1 TABLET BY MOUTH ONE TIME A DAY              metFORMIN (GLUCOPHAGE) 500 MG tablet  Take 500 mg by mouth 2 times daily             metoprolol (LOPRESSOR) 100 MG tablet  Take 100 mg by mouth 2 times daily.              ondansetron (ZOFRAN ODT) 4 MG disintegrating tablet  Take 1 tablet by mouth every 8 hours as needed for Nausea             pantoprazole (PROTONIX) 40 MG tablet  Take 1 tablet by mouth every morning (before breakfast)                  Diet: DIET GENERAL;  Dietary Nutrition Supplements: Frozen Oral Supplement    Follow-up with Glenda Lyons in 3 days    Signed: Glenda Lyons    Time spent on discharge 35 minutes

## 2020-02-25 NOTE — PROGRESS NOTES
INTERNAL MEDICINE PROGRESS NOTE        Kayy Santos   1940   Primary Care Physician:  Demetra Christiansen MD  Admit Date: 2/21/2020     Subjective:   Pt is doing better today. Denies chest pain, SOB, nausea, vomiting, abdominal pain. Remainder of ROS is unremarkable. Meds, labs and other notes reviewed. Objective:   /74   Pulse 74   Temp 98 °F (36.7 °C) (Oral)   Resp 18   Ht 5' 5\" (1.651 m)   Wt 202 lb (91.6 kg)   SpO2 100%   BMI 33.61 kg/m²    Recent Labs     02/24/20  0953 02/24/20  1157 02/24/20  1656 02/24/20  2134   POCGLU 114* 108* 109* 95       I/O last 3 completed shifts: In: 100 [P.O.:100]  Out: 300 [Urine:300]  No intake/output data recorded. Neck: no adenopathy and supple, symmetrical, trachea midline  Lungs: clear to auscultation bilaterally  Heart: regular rate and rhythm and S1, S2 normal  Abdomen: soft, non-tender; bowel sounds normal; no masses,  no organomegaly  Extremities: extremities normal, atraumatic, no cyanosis or edema  Neurologic: Grossly normal    Data Review  CBC with Differential:    Recent Labs     02/24/20  0908   WBC 8.3   RBC 3.07*   HGB 9.5*   HCT 30.1*      MCV 98.0   MCH 30.9   MCHC 31.6*   RDW 16.3*     CMP:    Recent Labs     02/24/20  0908 02/25/20  0537    139   K 3.8 3.8    104   CO2 22 21   BUN 18 17   CREATININE 1.2* 1.2*   GFRAA 53* 53*   LABGLOM 43* 43*   GLUCOSE 109* 118*   CALCIUM 9.0 8.8     PT/INR:  No results for input(s): PROTIME, INR in the last 72 hours.   Meds:    carbidopa-levodopa  1 tablet Oral TID    folic acid  1 mg Oral Daily    enoxaparin  30 mg Subcutaneous Daily    metoprolol  100 mg Oral BID    pantoprazole  40 mg Oral QAM AC    lisinopril  2.5 mg Oral Daily    insulin lispro  0-6 Units Subcutaneous TID WC    insulin lispro  0-3 Units Subcutaneous Nightly     PRN Meds: melatonin, magnesium hydroxide, acetaminophen, ondansetron, sodium chloride, glucose, dextrose, glucagon (rDNA), dextrose    Assessment/Plan:   1. Acute to subacute closed fracture of the distal fibula with minimum  Displacement. Fracture boot to left lower ext. 2.  Generalized weakness with recurrent falls, unclear etiology. PT/OT. Recent neuro workup has been essentially unremarkable. 3.  Hypertension. On Lisinopril. 4.  Hyperlipidemia. 5.  Diabetes mellitus. On SSI coverage. 6.  Coronary artery disease. Stable. 7.  Chronic kidney disease. Cret is stable. 8.  Obesity. 9.  Gastritis and duodenitis. On Protonix.        Smooth Espinoza MD  2/25/2020 7:51 AM

## 2020-02-25 NOTE — CONSULTS
scars, there is one almost open area to buttocks (Active)   Wound Pressure Unstageable 2/23/2020  9:18 AM   Dressing Status Other (Comment) 2/25/2020  9:30 AM   Dressing Changed Dressing reinforced 2/25/2020  9:27 AM   Wound Cleansed Soap and water 2/23/2020  9:18 AM   Wound Length (cm) 0 cm 2/25/2020  9:30 AM   Wound Width (cm) 0 cm 2/25/2020  9:30 AM   Wound Depth (cm) 0 cm 2/25/2020  9:30 AM   Wound Surface Area (cm^2) 0 cm^2 2/25/2020  9:30 AM   Wound Volume (cm^3) 0 cm^3 2/25/2020  9:30 AM   Distance Tunneling (cm) 0 cm 2/25/2020  9:30 AM   Tunneling Position ___ O'Clock 0 2/25/2020  9:30 AM   Undermining Starts ___ O'Clock 0 2/25/2020  9:30 AM   Undermining Ends___ O'Clock 0 2/25/2020  9:30 AM   Undermining Maxium Distance (cm) 0 2/25/2020  9:30 AM   Wound Assessment Intact; Pink 2/25/2020  9:30 AM   Drainage Amount None 2/25/2020  9:30 AM   Odor None 2/25/2020  9:30 AM   Margins Attached edges 2/25/2020  9:30 AM   Dianne-wound Assessment Induration 2/25/2020  9:30 AM   Non-staged Wound Description Not applicable 1/80/4437  4:28 AM   Number of days: 2       Response to treatment:  Well tolerated by patient. Pain Assessment:  Severity:  none  Quality of pain: na  Wound Pain Timing/Severity: na  Premedicated: no    Plan:     Plan of Care:    Pt in bed. Agreeable to skin assessment. Bilateral heels intact. Buttock with pale pink scar tissue noted on bilateral buttock and posterior thighs. No open areas noted at this time. Calazime cream applied. Purwick urine collection device intact. Repositioned to right side with heels floated with pillow support. Atmos air pump applied to mattress. Pt is at moderate risk for skin breakdown AEB Tuan. Follow Tuan orders.      Specialty Bed Required : yes  [] Low Air Loss   [] Pressure Redistribution  [] Fluid Immersion  [] Bariatric  [] Total Pressure Relief  [] Other:     Discharge Plan:  Placement for patient upon discharge: possibly SNF  Hospice Care: no  Patient

## 2020-02-25 NOTE — CARE COORDINATION
Pt on discharge to [address] [place] [address] bed/ reviewed note from Kim Carlos that pt has been approved. Set up with [facility] for 6:45pm.  Left vm for Kim Carlos with transport time (at [phone]). Packet prepared.

## 2020-02-26 NOTE — PROGRESS NOTES
Skin assessment performed by myself and Jovany Desir. Slight shearing noted on buttocks. Patient has a fractures fibula, there is a fracture boot at the bedside for when she is up with therapy.

## 2020-02-26 NOTE — H&P
point ROS reviewed negative, unless as noted above    Objective: Intake/Output Summary (Last 24 hours) at 2/26/2020 1317  Last data filed at 2/26/2020 1028  Gross per 24 hour   Intake 0 ml   Output --   Net 0 ml      Vitals:   Vitals:    02/26/20 0737   BP: (!) 115/58   Pulse: 74   Resp: 16   Temp: 98.6 °F (37 °C)   SpO2: 100%     Physical Exam:    GEN Awake female, laying in bed in no apparent distress. Appears given age. EYES Pupils are equally round. No scleral discharge  HENT Atraumatic and symmetric head  NECK No apparent thyromegaly  RESP Symmetric chest movement while on room air. CARDIO/VASC Peripheral pulses equal bilaterally and palpable. No peripheral edema. GI Abdomen is not distended. Rectal exam deferred.  Arriaza catheter is not present. HEME/LYMPH No petechiae or ecchymoses. MSK Spontaneous movement of BL upper extremities  SKIN Normal coloration, warm, dry. NEURO Cranial nerves appear grossly intact. Follows directions. No lateralizing findings in BL UEs and no facial droop. Frowns during conversation and it is symmetric   PSYCH Awake, alert. Oriented to self and time. Past Medical History:    PMHx:   Past Medical History:   Diagnosis Date    Acid reflux     Arthritis     \"Back\"    Back pain     \"Back Hurts Sometimes\"    CAD (coronary artery disease)     Sees Dr. Yudith Connell Diabetes mellitus [facility]) Dx 2000's    \"Borderline\"    Hiatal hernia     Hx of blood clots 2001    \"Blood Clots Legs After Heart Surgery\"    Hx of migraines     HX OTHER MEDICAL     Primary Care Physician Is Dr. Sadiq Jean Baptiste Hyperlipidemia     Hypertension     IBS (irritable bowel syndrome)     Right Breast Cancer 2005 Or 2006     Right Breast Mastectomy    Shortness of breath on exertion     UTI (urinary tract infection) In Past    No Current Symptoms    Wears dentures     Full Upper    Wears glasses     To Read     PSHx:  has a past surgical history that includes Cardiac surgery (2001); Dilatation, esophagus (\"3 Or 4\" Last Done 7-14); eye surgery (Bilateral, 2000's); Dental surgery; [place] tooth extraction; Appendectomy (Unsure When); Colonoscopy (\"Two\" Last Done 2000's); Cholecystectomy, laparoscopic (1990's Or 2000's); Breast surgery (Right, 2005 Or 2006); Inguinal hernia repair (Right, 1980's Or 1990's); hernia repair (1960's); Tubal ligation (1965); Tonsillectomy (1970's); Gastric fundoplication (0/4/65); hiatal hernia repair (2014); and Upper gastrointestinal endoscopy (N/A, 12/27/2019). Allergies: No Known Allergies  Home Medications:   Prior to Admission medications    Medication Sig Start Date End Date Taking? Authorizing Provider   gemfibrozil (LOPID) 600 MG tablet Take 600 mg by mouth 2 times daily    Historical Provider, MD   metFORMIN (GLUCOPHAGE) 500 MG tablet Take 500 mg by mouth 2 times daily    Historical Provider, MD   Cyanocobalamin (VITAMIN B 12 PO) Take 1 tablet by mouth daily    Historical Provider, MD   pantoprazole (PROTONIX) 40 MG tablet Take 1 tablet by mouth every morning (before breakfast) 12/29/19   Sadiq Ortega MD   ondansetron (ZOFRAN ODT) 4 MG disintegrating tablet Take 1 tablet by mouth every 8 hours as needed for Nausea 1/15/17   Baldev Pinedo DO   atorvastatin (LIPITOR) 80 MG tablet TAKE 1 TABLET BY MOUTH ONE TIME A DAY  1/5/17   Cristopher Bonilla MD   lisinopril (PRINIVIL;ZESTRIL) 2.5 MG tablet TAKE 1 TABLET BY MOUTH ONE TIME A DAY  1/5/17   Cristopher Bonilla MD   metoprolol (LOPRESSOR) 100 MG tablet Take 100 mg by mouth 2 times daily. Historical Provider, MD     FHx: family history includes Early Death in her brother; Early Death (age of onset: 28) in her father; Early Death (age of onset: 28) in her son; Early Death (age of onset: 40) in her brother; Heart Disease in her brother, father, and mother; Other in her daughter.   SHx:   Social History     Socioeconomic History    Marital status: Single     Spouse name: Not on file    Number of children: Not on file    Years of education: Not on file    Highest education level: Not on file   Occupational History    Not on file   Social Needs    Financial resource strain: Not on file    Food insecurity:     Worry: Not on file     Inability: Not on file    Transportation needs:     Medical: Not on file     Non-medical: Not on file   Tobacco Use    Smoking status: Former Smoker     Years: 7.00     Start date: 1971     Last attempt to quit: 1978     Years since quittin.6    Smokeless tobacco: Never Used   Substance and Sexual Activity    Alcohol use: No    Drug use: No     Comment: \"Smoked About 2 Or 3 Cigarettes A Day When Quit, I Didn't Ever Inhale\"    Sexual activity: Never   Lifestyle    Physical activity:     Days per week: Not on file     Minutes per session: Not on file    Stress: Not on file   Relationships    Social connections:     Talks on phone: Not on file     Gets together: Not on file     Attends [employer] service: Not on file     Active member of club or organization: Not on file     Attends meetings of clubs or organizations: Not on file     Relationship status: Not on file    Intimate partner violence:     Fear of current or ex partner: Not on file     Emotionally abused: Not on file     Physically abused: Not on file     Forced sexual activity: Not on file   Other Topics Concern    Not on file   Social History Narrative    Not on file       Medications:   Medications:    enoxaparin  30 mg Subcutaneous Daily    insulin lispro  0-6 Units Subcutaneous TID WC    insulin lispro  0-3 Units Subcutaneous Nightly    folic acid  1 mg Oral Daily    lisinopril  2.5 mg Oral Daily    metoprolol  100 mg Oral BID    pantoprazole  40 mg Oral QAM AC    vitamin B-12  1,000 mcg Oral Daily      Infusions:   PRN Meds: magnesium hydroxide, 30 mL, Daily PRN  glucagon (rDNA), 1 mg, PRN  glucose, 15 g, PRN  acetaminophen, 650 mg, Q6H PRN  melatonin, 3 mg, Nightly PRN  ondansetron, 4 mg, Q8H PRN  sodium chloride, 1 spray, Q2H PRN          Electronically signed by Nathaniel King DO on 2/26/2020 at 1:17 PM

## 2020-02-26 NOTE — PROGRESS NOTES
Physical Therapy    Facility/Department: [facility] UNIT  Initial Assessment    NAME: Amie Madden  : 1940  MRN: [id number]    Date of Service: 2020    Discharge Recommendations:  Continue to assess pending progress, ECF with PT, 24 hour supervision or assist   PT Equipment Recommendations  Equipment Needed: No  Other: continue to assess    Assessment   Body structures, Functions, Activity limitations: Decreased functional mobility ; Decreased safe awareness;Decreased balance;Decreased coordination;Decreased ROM; Decreased endurance; Increased pain;Decreased strength;Decreased sensation;Decreased posture;Decreased fine motor control  Assessment: P is 66 yo female who presents with weakness and neurological impairments and with significant deficits in motor control, balance, strength, sensation and overall reduced safety with mobility. P was previous swing bed patient who was transferred to  following finding of acute fibula fracture. P was readmitted to swing and is WBAT on LLE. Recommend skilled PT to address deficits to maximize functional potential. Also recommend further neurological consult to determine cause of ataxia and impaired motor control. Treatment Diagnosis: impaired motor control, balance, strength, mobility  Prognosis: Fair  Decision Making: High Complexity  History: see below  Exam: assessed strength, balance, mobility, transfers  Clinical Presentation: unstable/unpredicatble  PT Education: PT Role;Plan of Care;General Safety  Barriers to Learning: none  REQUIRES PT FOLLOW UP: Yes  Activity Tolerance  Activity Tolerance: Patient limited by pain; Patient limited by fatigue       Patient Diagnosis(es): There were no encounter diagnoses.      has a past medical history of Acid reflux, Arthritis, Back pain, CAD (coronary artery disease), Diabetes mellitus ([address] Hiatal hernia, Hx of blood clots, Hx of migraines, HX OTHER MEDICAL, Hyperlipidemia, Hypertension, IBS (irritable bowel syndrome), Right Breast Cancer, Shortness of breath on exertion, UTI (urinary tract infection), Wears dentures, and Wears glasses. has a past surgical history that includes Cardiac surgery (2001); Dilatation, esophagus (\"3 Or 4\" Last Done 7-14); eye surgery (Bilateral, 2000's); Dental surgery; [place] tooth extraction; Appendectomy (Unsure When); Colonoscopy (\"Two\" Last Done 2000's); Cholecystectomy, laparoscopic (1990's Or 2000's); Breast surgery (Right, 2005 Or 2006); Inguinal hernia repair (Right, 1980's Or 1990's); hernia repair (1960's); Tubal ligation (1965); Tonsillectomy (1970's); Gastric fundoplication (1/4/10); hiatal hernia repair (2014); and Upper gastrointestinal endoscopy (N/A, 12/27/2019).     Restrictions  Restrictions/Precautions  Restrictions/Precautions: Fall Risk, Weight Bearing  Required Braces or Orthoses?: Yes(LLE boot when standing)  Lower Extremity Weight Bearing Restrictions  Left Lower Extremity Weight Bearing: Weight Bearing As Tolerated(Wearing boot in standing)  Position Activity Restriction  Other position/activity restrictions: WBAT LLE with boot  Vision/Hearing  Vision: Impaired  Vision Exceptions: Wears glasses for reading  Hearing: Within functional limits     Subjective  General  Chart Reviewed: Yes  Patient assessed for rehabilitation services?: Yes  Family / Caregiver Present: Yes  Follows Commands: Within Functional Limits  Pain Screening  Patient Currently in Pain: Denies  Vital Signs  Patient Currently in Pain: Denies       Orientation  Orientation  Orientation Level: Oriented to place;Oriented to time;Oriented to situation  Social/Functional History  Social/Functional History  Lives With: Daughter  Type of Home: Apartment  Home Layout: One level  Home Access: Level entry  Bathroom Shower/Tub: Tub/Shower unit, Shower chair with back  Bathroom Toilet: Standard  Bathroom Equipment: 3-in-1 commode  Home Equipment: [employer], [address] Help From: Family  ADL Assistance: Independent  Homemaking Assistance: Independent  Homemaking Responsibilities: Yes  Meal Prep Responsibility: Primary  Laundry Responsibility: Primary  Cleaning Responsibility: Primary  Shopping Responsibility: Primary  Ambulation Assistance: Needs assistance(Uses cane occasionally in community, does not use AD at home)  Transfer Assistance: Independent  Active : Yes  Leisure & Hobbies: plays bingo every wednesday, hang with her friends  Additional Comments: Pt's daughter reports Pt had a continuous decline since admission at [facility] in January  Cognition   Cognition  Overall Cognitive Status: [facility]    Objective     Observation/Palpation  Observation: P supine in bed with HOb elevated    PROM RLE (degrees)  RLE PROM: Exceptions  RLE General PROM: impaired ankle DF/PF  PROM LLE (degrees)  LLE General PROM: impaired ankle DF/PF  Strength RLE  Strength RLE: Exception  R Hip Flexion: 3/5  R Knee Extension: 3+/5  R Ankle Dorsiflexion: 0/5  R Ankle Plantar flexion: 1+/5  Strength LLE  Strength LLE: Exception  L Hip Flexion: 3/5  L Knee Extension: 3+/5  L Ankle Dorsiflexion: 0/5  L Ankle Plantar Flexion: 1+/5  Tone RLE  RLE Tone: Normotonic  Tone LLE  LLE Tone: Normotonic  Motor Control  Gross Motor?: Exceptions  Comments: dysmetria, uncontrolled movement and ataxia noted in UE, decreased control and increased time to move LEs, no clonus  Coordination  Heel to Shin: Abnormal  Sensation  Overall Sensation Status: [facility]  Bed mobility  Rolling to Left: Moderate assistance  Rolling to Right: Moderate assistance  Supine to Sit: Moderate assistance  Sit to Supine: Maximum assistance;2 Person assistance  Scooting: Dependent/Total  Comment: P requires mod A to complete transition to EOB. P requires multiple small movements 2/2 decreased ability to weight bear/push off with UEs due to ataxia.   Transfers  Sit to Stand: Maximum Assistance;2 Person Assistance  Stand to sit: 2 Person Assistance;Maximum Assistance  Comment: LUCI performs 2 sit <> stands with L boot on. P stands with BUE supported on platform of cardiac walker with ability to clear bottom from bed. P unable to come to full standing. P reports cramp in RLE and unable to tolerate 3rd attempt. Ambulation  Ambulation?: No(unsafe to attempt)  Stairs/Curb  Stairs?: No     Balance  Sitting - Static: Fair(sits EOB with CGA to CS x 10 min during assessment and set up)  Sitting - Dynamic: Fair  Standing - Static: Poor;-  Standing - Dynamic: Poor;-        Plan   Plan  Times per week: 5+(mon-sat)  Plan weeks: 2 weeks or until discharge  Current Treatment Recommendations: Strengthening, Transfer Training, Endurance Training, Neuromuscular Re-education, Patient/Caregiver Education & Training, Pain Management, Equipment Evaluation, Education, & procurement, Balance Training, Gait Training, Home Exercise Program, Safety Education & Training, Positioning, Functional Mobility Training, Stair training, Manual Therapy - Joint Manipulation, Wheelchair Mobility Training, ROM  Safety Devices  Type of devices: Bed alarm in place, Left in bed, Call light within reach, Patient at risk for falls    AM-PAC Score  AM-PAC Inpatient Mobility Raw Score : 8 (02/26/20 1528)  AM-PAC Inpatient T-Scale Score : 28.52 (02/26/20 1528)  Mobility Inpatient CMS 0-100% Score: 86.62 (02/26/20 1528)  Mobility Inpatient CMS G-Code Modifier : CM (02/26/20 1528)          Goals  Short term goals  Time Frame for Short term goals: 1 week  Short term goal 1: Patient will perform bed mobility with min A. Short term goal 2: P will stand x 1 min at LRAD with min A demonstrating improved strength and standing tolerance. Short term goal 3: P will perform stand step transfer with LRAD and mod A from bed <>chair. Short term goal 4: P will perform sit <> stand with mod A to LRAD.        Therapy Time   Individual Concurrent Group Co-treatment   Time In       1323   Time Out       1358   Minutes       35   Timed Code Treatment Minutes: [address], PT    Electronically signed by Isaak Apodaca PT on 2/26/2020 at 3:29 PM

## 2020-02-26 NOTE — PROGRESS NOTES
220 lb (99.8 kg)  · % Weight Change:  ,  7% weight loss noted in 1 week  · Ideal Body Wt: 125 lb (56.7 kg), % Ideal Body 153%  · BMI Classification: BMI 30.0 - 34.9 Obese Class I(32.1)    Nutrition Interventions:   Continue current diet, Continue current ONS  Continued Inpatient Monitoring, Education Not Indicated, Coordination of Care    Nutrition Evaluation:   · Evaluation: Goals set   · Goals: Oral intakes and supplements will improve to at least 75%    · Monitoring: Meal Intake, Supplement Intake, Diet Tolerance, Weight, Pertinent Labs      Electronically signed by Kristal Ewing RD, LD on 2/26/20 at 3:28 PM    Contact Number: [phone]

## 2020-02-27 NOTE — PROGRESS NOTES
Pt refused alarms                []  Telesitter activated      []  Gait belt used during tx session      []other:       Number of Minutes/Billable Intervention  Therapeutic Exercise 15   ADL Self-care 20   Neuro Re-Ed    Therapeutic Activity 30   Group    Other:    TOTAL 65       Social History  Social/Functional History  Lives With: Daughter  Type of Home: Apartment  Home Layout: One level  Home Access: Level entry  Bathroom Shower/Tub: Tub/Shower unit, Shower chair with back  Bathroom Toilet: Standard  Bathroom Equipment: 3-in-1 commode  Home Equipment: [address] Help From: Family  ADL Assistance: Independent  Homemaking Assistance: Independent  Homemaking Responsibilities: Yes  Meal Prep Responsibility: Primary  Laundry Responsibility: Primary  Cleaning Responsibility: Primary  Shopping Responsibility: Primary  Ambulation Assistance: Needs assistance(Uses cane occasionally in community, does not use AD at home)  Transfer Assistance: Independent  Active : Yes  Leisure & Hobbies: plays [employer] every wednesday, hang with her friends  Additional Comments: Pt's daughter reports Pt had a continuous decline since admission at [facility] in January    Objective                                                                                    Goals:  (Update in navigator)  Short term goals  Time Frame for Short term goals: until discharge  Short term goal 1: Pt will be Min A x1 for functional adl transfers to chair, toilet or bed w/o LOB or falls  Short term goal 2: In a supported position, pt will be able to feed self with necessary a.e. Short term goal 3: In supported position, Pt will be SBA for UB bathing/Mod A LB bathing  Short term goal 4:  In a supported position Pt will be Min A UB dressing and with compensatory techniques Mod A LB dressing  Short term goal 5: Pt will be min vc for UE strengthening and weightbearing activities to improve coordination and assist with strength for transfers: :        Plan of Care                                                                              Times per week: 5 days per week for a minimum of 60 minutes/day plus group as appropriate for 60 minutes.   Treatment to include Plan  Times per week: 4+  Current Treatment Recommendations: Strengthening, ROM, Endurance Training, Neuromuscular Re-education, Patient/Caregiver Education & Training, Equipment Evaluation, Education, & procurement, Balance Training, Pain Management, Self-Care / ADL, Functional Mobility Training, Safety Education & Training, Home Management Training    Electronically signed by   Ibrahima Hoyos,  2/27/2020, 4:19 PM

## 2020-02-27 NOTE — PROGRESS NOTES
Physical Therapy    Physical Therapy Treatment Note  Name: Sulaiman Judge MRN: [id number] :   1940   Date:  2020   Admission Date: 2020 Room:  [address] [place] [address]   Restrictions/Precautions:  Restrictions/Precautions  Restrictions/Precautions: Fall Risk, Weight Bearing  Required Braces or Orthoses?: Yes(LLE boot when standing) Lower Extremity Weight Bearing Restrictions  Left Lower Extremity Weight Bearing: Weight Bearing As Tolerated(Wearing boot in standing)     Communication with other providers:  Co-treat with OT. Nursing aware of p's concerns with bowel movements. Subjective:  Patient states:  \"It's not a good day today\"  Pain:   Location, Type, Intensity (0/10 to 10/10):  Reports L leg pain  Objective:    Observation:  P supine in bed with HOB elevated. Treatment, including education/measures: There. Ex: P performs supine exercise with min A to stabilize. P with impaired motor control and ataxia with all limb movements. P performs hip/knee bends x 10 reps, SAQ over pillow with assist to stabilize knee x 10 reps, hip abd/add x 10 reps, ankle DF Brenda x 10 reps. P demos DF movement but unable to move through full range. P with noted fatigue after 8 reps moving through decreased range. P attempted bridge with LLE straight and unable to lift bottom without increased L leg pain. Bed mobility: P performs supine to sit with p able to initiate LE movement and sit up in long sit. P unable to weight bear or grasp through UEs requiring max A to scoot to EOB. P requires max A for LEs for sit to supine and mod A to control trunk. P rolls BRENDA during toileting x 3 reps with mod A to complete roll. P dependent for toileting management and pericare. P is dependent to scoot up in bed. Transfers: P set up to  julia plus with R shoe donned and L boot donned. P set up in harness and requires CGa to CS for sitting balance. 2 attempts to stand with julia plus lift.  P unable to stand for first attempt harness reports Pt had a continuous decline since admission at [facility] in January  Short term goals  Time Frame for Short term goals: 1 week  Short term goal 1: Patient will perform bed mobility with min A. Short term goal 2: P will stand x 1 min at LRAD with min A demonstrating improved strength and standing tolerance. Short term goal 3: P will perform stand step transfer with LRAD and mod A from bed <>chair. Short term goal 4: P will perform sit <> stand with mod A to LRAD.        Electronically signed by:    Abelardo Kendrick, PT  2/27/2020, 4:25 PM

## 2020-02-28 NOTE — PROGRESS NOTES
Physical Therapy    Physical Therapy Treatment Note  Name: Mohan Moreno MRN: [id number] :   1940   Date:  2020   Admission Date: 2020 Room:  [address] [place] [address]   Restrictions/Precautions:  Restrictions/Precautions  Restrictions/Precautions: Fall Risk, Weight Bearing  Required Braces or Orthoses?: Yes(LLE boot when standing) Lower Extremity Weight Bearing Restrictions  Left Lower Extremity Weight Bearing: Weight Bearing As Tolerated(Wearing boot in standing)     Communication with other providers:  Co-treat with OT. Nursing assisted with toileting. Nursing aware of patient's pain. Subjective:  Patient states: \"My leg hurts a little\"  Pain:   Location, Type, Intensity (0/10 to 10/10):  Reports little pain at first and increased during session, requested pain meds  Objective:    Observation:  P supine in bed. P had bout of bowel incontinence during session. Treatment, including education/measures: There. Ex: P performs supine there ex with hip flexion, SAQ over pillow, ankle DF, hip abd x 12 reps. P able to move LE through majority of range with min A to assist with alignment. P with improved control on LLE compared to RLE. Bed mobility: P rolls Brenda x 5 reps during session during toileting and scooting up. P require mod A for control of movement. P able to initiate movement but demos decreased distal control. P performs sit to supine with CGA to initiate LE movement and trunk upright. P requires max A to scoot to EOB and align. P requires max A x 2 for sit to supine 2/2 decreased positioning following stand resulting in p anterior sliding to EOB. P requires CGA to min A for sitting balance. P unable to stabilize 2/2 decreased distal control of UEs. Transfers: Increased time for set up with p at cardiac walker with L boot on and R shoe. 2 person assist with knee block, bed raised. P able to place UEs on platform and demos improved muscle activation.  P able to stand with mod A x 2 and clear bottom up to

## 2020-02-28 NOTE — PROGRESS NOTES
Occupational Therapy  Physical Rehabilitation: OCCUPATIONAL THERAPY     [x] daily progress note       [] discharge       Patient Name:  Colleen Olivo   :  1940 MRN: [id number]  Room:  [address] [place] [address] Date of Admission: 2020  Rehabilitation Diagnosis:   Debility [R53.81]       Date 2020       Day of ARU Week:  4   Time IN/OUT 1104/1200   Individual Tx Minutes    Group Tx Minutes    Co-Treat Minutes 64   Concurrent Tx Minutes    TOTAL Tx Time Mins    Variance Time    Variance Time []   Refusal due to:     []   Medical hold/reason:    []   Illness   []   Off Unit for test/procedure  []   Extra time needed to complete task  []   Therapeutic need  []   Other (specify):   Restrictions Restrictions/Precautions: Fall Risk, Weight Bearing         Communication with other providers: [x]   OK to see per nursing:     []   Spoke with team member regarding:      Subjective observations and cognitive status:      Pain level/location:  0  /10       Location: says she has pain in her L ankle   Discharge recommendations  Anticipated discharge date: To be determined  Destination: []home alone   []home alone w assist prn   [] home w/ family    [] Continuous supervision       []SNF    [] Assisted living     [] Other:   Continued therapy: []HHC OT  []OUTPATIENT  OT   [] No Further OT  Equipment needs:to be determined     Toileting:  D; did not realize she had had a bowel movement    Toilet Transfers:  Rolling to clean the patient  Device Used:    []   Standard Toilet         []   Grab Bars           []  Bedside Commode       []   Elevated Toilet          []   Other:        Bed Mobility:           []   Pt received out of bed   Rolling R/L:  Mod A x2  Scooting:   Max A x2  Supine --> Sit: max A x2  Sit --> Supine:  D x2    Transfers:    Sit--> Stand: tried using cardiac walker   Stand --> Sit:  Mod A x2 but did stand up a little taller 8 inches off the bed  Stand-Pivot:   x  Other:    Assistive device required for transfer:

## 2020-02-28 NOTE — PROGRESS NOTES
Nutrition Assessment    Type and Reason for Visit: Reassess    Nutrition Recommendations: may benefit from alternative nutrition support or appetite stimulant due to poor intakes with refusal.    Nutrition Assessment: No new weight at this time. Oral intakes remain poor at 0% of most meals with 1 recent meal %. Accepting supplement 1-25%. If oral intakes do not improve may benefit from alternative nutrition or appetite stimulant. Malnutrition Assessment:  · Malnutrition Status: Meets the criteria for severe malnutrition  · Context: Acute illness or injury  · Findings of the 6 clinical characteristics of malnutrition (Minimum of 2 out of 6 clinical characteristics is required to make the diagnosis of moderate or severe Protein Calorie Malnutrition based on AND/ASPEN Guidelines):  1. Energy Intake-Less than or equal to 75% of estimated energy requirement, Greater than or equal to 1 month    2. Weight Loss-7.5% loss or greater, in 1 week  3. Fat Loss-Severe subcutaneous fat loss, Orbital  4. Muscle Loss-Severe muscle mass loss, Temples (temporalis muscle)  5. Fluid Accumulation-Unable to assess,    6.   Strength-Not measured    Nutrition Risk Level: High    Nutrient Needs:  · Estimated Daily Total Kcal: 0793-1955  · Estimated Daily Protein (g): 57-68  · Estimated Daily Total Fluid (ml/day): 8042-3235(1ml/kcal)    Nutrition Diagnosis:   · Problem: Inadequate oral intake, Severe malnutrition, In context of acute illness or injury  · Etiology: related to Insufficient energy/nutrient consumption     Signs and symptoms:  as evidenced by Weight loss 1-2% in 1 week, Severe loss of subcutaneous fat, Severe muscle loss    Objective Information:  · Nutrition-Focused Physical Findings: Tuan Nutrition Score 1  · Wound Type: None  · Current Nutrition Therapies:  · Oral Diet Orders: General   · Oral Diet intake: Refusing to eat, 0%, %(1 meal %)  · Oral Nutrition Supplement (ONS) Orders: Frozen Oral

## 2020-02-28 NOTE — PLAN OF CARE
Patient working with PT/OT minimally. Not interested in eating, drinking juice. Unable to feed self even with adaptive devices. Turn every 2 hours to prevent skin breakdown. Will continue to monitor and assess.     Problem: Falls - Risk of:  Goal: Will remain free from falls  Description  Will remain free from falls  Outcome: Met This Shift  Goal: Absence of physical injury  Description  Absence of physical injury  Outcome: Met This Shift     Problem: Pain:  Goal: Pain level will decrease  Description  Pain level will decrease  Outcome: Met This Shift  Goal: Control of acute pain  Description  Control of acute pain  Outcome: Met This Shift

## 2020-02-29 NOTE — PROGRESS NOTES
Blood pressure 95/56, blood sugar 116, not eating or drinking, has not eaten since yesterday at breakfast, Dr Evelina Jaime at bedside, received orders to hold metoprolol, lisinopril and metformin.

## 2020-02-29 NOTE — FLOWSHEET NOTE
Physical Therapy DailyTreatment Note   Date: 2020 Room: [unfilled]   Name: Twyla Kim : 1940   MRN: [id number]   Admission Date:2020        Rehabilitation Diagnosis: Debility [R53.81]    Objective                                                                                    Goals:  (Update in navigator)  Short term goals  Time Frame for Short term goals: 1 week  Short term goal 1: Patient will perform bed mobility with min A. Short term goal 2: P will stand x 1 min at LRAD with min A demonstrating improved strength and standing tolerance. Short term goal 3: P will perform stand step transfer with LRAD and mod A from bed <>chair. Short term goal 4: P will perform sit <> stand with mod A to LRAD. :   :        Plan of Care                                                                              Times per week: 5+ days per week for a minimum of 15 minutes/day  Treatment to include Current Treatment Recommendations: Strengthening, Transfer Training, Endurance Training, Neuromuscular Re-education, Patient/Caregiver Education & Training, Pain Management, Equipment Evaluation, Education, & procurement, Balance Training, Gait Training, Home Exercise Program, Safety Education & Training, Positioning, Functional Mobility Training, Stair training, Manual Therapy - Joint Manipulation, Wheelchair Mobility Training, ROM    Date  2020   TIMES IN/OUT   1023/1040   Restrictions/Precautions Restrictions/Precautions: Fall Risk, Weight Bearing         Current Diet/Swallowing Issues DIET GENERAL;  Dietary Nutrition Supplements: Frozen Oral Supplement   Communication with other providers: [x] [facility] to see per nursing   [] Medical hold and reason  [] Spoke with (team    member) regarding   Subjective observations: Patient in bed upon arrival and agreeable to PT treatment. Reports pain in L LE.    [x]   Gait belt used during tx session   Pain level/location: Pre-tx  nut certain if wants pain meds before or after PT  Post-tx 7/10   Bed Mobility   Patient refused     Transfers Patient refused   Standing Tolerance Patient refused   Amb/Locomotion: AD/Distance/Assist Patient refused            Steps (#)/Assist/Rails/AD Not performed   Ramp:AD/Assist Not performed   Curb:height AD/Assist Not performed   Uneven:type AD/Assist Not performed   W/C distance/Assist   Not performed   Strategies that improved performance: encouragement   Barriers to progress/participation: Lack of motivation this date and patient reports she is cold and did not sleep well and very tired   Alarm placed/where? Fall Risk  [facility] ] left in bed  [ ] left in chair [facility] ] call light within reach  [ ] bed alarm on  [ ] personal alarm on [ ] [de-identified]  [ ] other staff present:   Discharge recommendations  Anticipated discharge date:  TBD  Destination: []home alone   []home alone with assist prn  []Continuous supervision  []SNF  [] Assisted living   Continued therapy: []HHC PT  []OUTPATIENT  PT   [] No Further PT  Equipment needs: see eval     Therapeutic activities/exercises completed this date:     Modality/intervention used:   [employer] ] Therapeutic Exercise    [ ] Modalities:   [ ] Therapeutic Activity    [ ] Ultrasound   [ ] Alvy María Elena   [ ] Gait Training     [ ] Cervical Traction  [ ] Lumbar Traction   [ ] Neuromuscular Re-education   [ ] Cold/hotpack  [ ] Iontophoresis   [ ] Instruction in HEP     [ ] Susanna Parks   [ ] Manual Therapy   [ ] Aquatic Therapy     Patient/Caregiver Education and Training: importance of mobility to increase blood flow and improve strength     Exercise/Equipment/Modalities this date   Ankle pumps 10x B, heel slides 10x B (AAROM L), hip abd 10x B, SLR 10x B (AAROM L), glute sets 10x, quad sets 10x B                    Treatment Plan for Next Session: continue with mobility as able.      Assessment / Impression                                                          Treatment/Activity Tolerance:   [] Tolerated Treatment well:     [x]

## 2020-03-02 NOTE — FLOWSHEET NOTE
PT attempted to see pt, but she did not wake up or open eyes.   PT will attempt again later today    Frances Knife PT DPT [id number]

## 2020-03-02 NOTE — CARE COORDINATION
CM spoke with the patient's daughter Kane Blanca ([phone]) to notify her that the last day of insurance coverage in the swing bed program is Friday 3/6/20 and the first day of patient liability is Saturday 3/7/20, Rosita Ybarra voiced understanding. Rosita Ybarra states she has discussed nursing facilities with her family and they would like to get the patient placed in a SNF in [facility] and are interested in [facility], [facility], and [facility]. CM will follow-up with these locations to verify if they are in-network with [facility] and if so, bed availability. 11:40 AM  CM spoke with [facility] who states they do not accept [facility] and neither does [facility]. CM spoke with Brenden John at [facility] who states she they cannot accept the patient without Medicaid in place in the event that the patient would need LTC. 11:50 AM  CM spoke with Kane Blanca regarding [facility]'s denial without approved Medicaid and informed [facility]Jian Wilson not [employer]. CM provided Rosita Ybarra with 7 other SNF in the [facility] area. CM spoke with admissions at [facility] who does have short-term beds available. CM faxed ([phone]) requested clinical documentation for review. Rosita Ybarra will consider the list of SNF's provided. CM will follow.

## 2020-03-02 NOTE — PLAN OF CARE
Pt spit out AM medications. Only wants to sleep, became agitated when awakened by this nurse once. Deb Fl[place] lift used to transfer patient to chair when she refused attempt to stand. Not eating and oral liquid intake very poor. Will continue to monitor and assess. Problem: Falls - Risk of:  Goal: Will remain free from falls  Description  Will remain free from falls  Outcome: Met This Shift  Goal: Absence of physical injury  Description  Absence of physical injury  Outcome: Met This Shift     Problem: Risk for Impaired Skin Integrity  Goal: Tissue integrity - skin and mucous membranes  Description  Structural intactness and normal physiological function of skin and  mucous membranes.   Outcome: Met This Shift     Problem: Pain:  Goal: Pain level will decrease  Description  Pain level will decrease  Outcome: Met This Shift  Goal: Control of acute pain  Description  Control of acute pain  Outcome: Met This Shift

## 2020-03-02 NOTE — PROGRESS NOTES
Occupational Therapy  Physical Rehabilitation: OCCUPATIONAL THERAPY     [x] daily progress note       [] discharge       Patient Name:  Ginger Hess   :  1940 MRN: [id number]  Room:  [address] [place] [address] Date of Admission: 2020  Rehabilitation Diagnosis:   Debility [R53.81]       Date 3/2/2020       Day of ARU Week:  7   Time IN/OUT 1125/1205   Individual Tx Minutes    Group Tx Minutes    Co-Treat Minutes 40   Concurrent Tx Minutes    TOTAL Tx Time Mins 40   Variance Time    Variance Time []   Refusal due to:     []   Medical hold/reason:    []   Illness   []   Off Unit for test/procedure  []   Extra time needed to complete task  []   Therapeutic need  []   Other (specify):   Restrictions Restrictions/Precautions: Fall Risk, Weight Bearing         Communication with other providers: [x]   OK to see per nursing:     []   Spoke with team member regarding:      Subjective observations and cognitive status:      Pain level/location:  0  /10       Location: says she has pain in her R knee    Discharge recommendations  Anticipated discharge date:   To be determined  Destination: []home alone   []home alone w assist prn   [] home w/ family    [] Continuous supervision       []SNF    [] Assisted living     [] Other:   Continued therapy: []HHC OT  []OUTPATIENT  OT   [] No Further OT  Equipment needs:to be determined     Toileting:  xToilet Transfers:  Rolling to clean the patient  Device Used:    []   Standard Toilet         []   Grab Bars           []  Bedside Commode       []   Elevated Toilet          []   Other:        Bed Mobility:           []   Pt received out of bed   Rolling R/L:  Mod A x2  D x2 for maría elena lift    Transfers:    María Elena lift to chair  Assistive device required for transfer:        Functional Mobility:    Assistance:  Unable;   Device:   []   Amena Route     []   Standard Walker []   Wheelchair        []   [address]       []   Ciara Adairler         []   Cardiac Walker       []   Other: Homemaking Tasks: x    Additional Therapeutic activities/exercises completed this date:     []   ADL Training   [x]   Balance/Postural training     [x]   Bed/Transfer Training   []   Endurance Training   []   Neuromuscular Re-ed   []   Nu-step:  Time:        Level:         #Steps:       []   Rebounder:    []  Seated     []  Standing        []   Supine Ther Ex (reps/sets):     []   Seated Ther Ex (reps/sets):     []   Standing Ther Ex (reps/sets):     []   Other:      Comments:      Patient/Caregiver Education and Training:   []   [employer] Equipment Use  [x]   Bed Mobility/Transfer Technique/Safety  []   Energy Conservation Tips  []   Family training  []   Postural Awareness  [x]   Safety During Functional Activities  []   Reinforced Patient's Precautions   []   Progress was updated and reviewed in Rehabtracker with patient and/or family this         date.     Treatment Plan for Next Session: transfers, exercise     Assessment:        Treatment/Activity Tolerance:   [] Tolerated treatment with no adverse effects    [x] Patient limited by fatigue  [] Patient limited by pain   [] Patient limited by medical complications:    [] Adverse reaction to Tx:   [] Significant change in status    Safety:       []  bed alarm set    []  chair alarm set    []  Pt refused alarms                []  Telesitter activated      []  Gait belt used during tx session      []other:       Number of Minutes/Billable Intervention  Therapeutic Exercise    ADL Self-care    Neuro Re-Ed    Therapeutic Activity 40   Group    Other:    TOTAL 40       Social History  Social/Functional History  Lives With: Daughter  Type of Home: Apartment  Home Layout: One level  Home Access: Level entry  Bathroom Shower/Tub: Tub/Shower unit, Shower chair with back  Bathroom Toilet: Standard  Bathroom Equipment: 3-in-1 commode  Home Equipment: [employer], [address] Help From: Family  ADL Assistance: Independent  Homemaking Assistance:

## 2020-03-02 NOTE — FLOWSHEET NOTE
Physical Therapy DailyTreatment Note   Date: 3/2/2020 Room: [unfilled]   Name: Alexi Traore : 1940   MRN: [id number]   Admission Date:2020        Rehabilitation Diagnosis: Debility [R53.81]    Objective                                                                                    Goals:  (Update in navigator)  Short term goals  Time Frame for Short term goals: 1 week  Short term goal 1: Patient will perform bed mobility with min A. Short term goal 2: P will stand x 1 min at LRAD with min A demonstrating improved strength and standing tolerance. Short term goal 3: P will perform stand step transfer with LRAD and mod A from bed <>chair. Short term goal 4: P will perform sit <> stand with mod A to LRAD. :   :        Plan of Care                                                                              Times per week: 5+ days per week for a minimum of 15 minutes/day  Treatment to include Current Treatment Recommendations: Strengthening, Transfer Training, Endurance Training, Neuromuscular Re-education, Patient/Caregiver Education & Training, Pain Management, Equipment Evaluation, Education, & procurement, Balance Training, Gait Training, Home Exercise Program, Safety Education & Training, Positioning, Functional Mobility Training, Stair training, Manual Therapy - Joint Manipulation, Wheelchair Mobility Training, ROM    Date  3/2/2020   TIMES IN/OUT   11:25 am-12:05pm   Restrictions/Precautions Restrictions/Precautions: Fall Risk, Weight Bearing         Current Diet/Swallowing Issues DIET GENERAL;  Dietary Nutrition Supplements: Frozen Oral Supplement, Standard High Calorie Oral Supplement   Communication with other providers: [x] Ok to see per nursing   [] Medical hold and reason  [x] OT co treat   Subjective observations: Patient in bed upon arrival and agreeable to PT treatment. Reports pain in R LE. She reports she did not sleep well last night.   [x]   Gait belt used during tx session   Pain if she cannot stand. Exercise/Equipment/Modalities this date   none                    Treatment Plan for Next Session: continue with mobility as able.      Assessment / Impression                                                          Treatment/Activity Tolerance:   [x] Tolerated Treatment well:     [x] Patient limited by fatigue/pain:       [] Patient limited by medical complications:    [] Adverse Reaction to Tx:   [] Significant change in status    Plan:   Tee.Marus ] Continue per plan of care [ ] Isis Zelaya current plan   [ ] Plan of care initiated [ ] Hold pending MD visit [ ] Discharged    Billing:  Billed units: 2 therapeutic activity, 1 therapeutic exercise       Signed: Gregory Warren DPT [id number]  3/2/2020, 1:15 PM

## 2020-03-03 NOTE — PROGRESS NOTES
Occupational Therapy  Physical Rehabilitation: OCCUPATIONAL THERAPY     [x] daily progress note       [] discharge       Patient Name:  Priya Sharma   :  1940 MRN: [id number]  Room:  [address] [place] [address] Date of Admission: 2020  Rehabilitation Diagnosis:   Debility [R53.81]       Date 3/3/2020         Time IN/OUT 8222-2699   Individual Tx Minutes    Group Tx Minutes    Co-Treat Minutes 48   Concurrent Tx Minutes    TOTAL Tx Time Mins 48   Variance Time    Variance Time []   Refusal due to:     []   Medical hold/reason:    []   Illness   []   Off Unit for test/procedure  []   Extra time needed to complete task  []   Therapeutic need  []   Other (specify):   Restrictions Restrictions/Precautions: Fall Risk, Weight Bearing         Communication with other providers: [x]   OK to see per nursing:     []   Spoke with team member regarding:      Subjective observations and cognitive status: Pt reports she has been feeling nauseous today. Reports she wishes to attempt to stand at EOB today. Pain level/location:  0  /10       Location:     Discharge recommendations  Anticipated discharge date: To be determined  Destination: []home alone   []home alone w assist prn   [] home w/ family    [] Continuous supervision       []SNF    [] Assisted living     [] Other:   Continued therapy: []HHC OT  []OUTPATIENT  OT   [] No Further OT  Equipment needs:to be determined     Toileting:  X  Toilet Transfers:   Device Used:    []   Standard Toilet         []   Vena Clare           []  Bedside Commode       []   Elevated Toilet          []   Other:        Bed Mobility:           []   Pt received out of bed   Rolling R/L:  Mod A x2      Transfers:    Max x2 attempted sit to stand x3. Successful x1. Following 3rd attempt, pt began to lean anteriorly, dependent to return to supine in bed.    Assistive device required for transfer:  Cardiac walker      Functional Mobility:    Assistance:  Unable;   Device:   []   Pratima Cleveland     [] Telesitter activated      [x]  Gait belt used during tx session      []other:       Number of Minutes/Billable Intervention  Therapeutic Exercise    ADL Self-care    Neuro Re-Ed    Therapeutic Activity 48   Group    Other:    TOTAL 48       Social History  Social/Functional History  Lives With: Daughter  Type of Home: Apartment  Home Layout: One level  Home Access: Level entry  Bathroom Shower/Tub: Tub/Shower unit, Shower chair with back  Bathroom Toilet: Standard  Bathroom Equipment: 3-in-1 commode  Home Equipment: [employer], [address] Help From: Family  ADL Assistance: Independent  Homemaking Assistance: Independent  Homemaking Responsibilities: Yes  Meal Prep Responsibility: Primary  Laundry Responsibility: Primary  Cleaning Responsibility: Primary  Shopping Responsibility: Primary  Ambulation Assistance: Needs assistance(Uses cane occasionally in community, does not use AD at home)  Transfer Assistance: Independent  Active : Yes  Leisure & Hobbies: plays bingo every wednesday, hang with her friends  Additional Comments: Pt's daughter reports Pt had a continuous decline since admission at [facility] in January    Objective                                                                                    Goals:  (Update in navigator)  Short term goals  Time Frame for Short term goals: until discharge  Short term goal 1: Pt will be Min A x1 for functional adl transfers to chair, toilet or bed w/o LOB or falls  Short term goal 2: In a supported position, pt will be able to feed self with necessary a.e. Short term goal 3: In supported position, Pt will be SBA for UB bathing/Mod A LB bathing  Short term goal 4:  In a supported position Pt will be Min A UB dressing and with compensatory techniques Mod A LB dressing  Short term goal 5: Pt will be min vc for UE strengthening and weightbearing activities to improve coordination and assist with strength for transfers:   :        Plan of Care Treatment to include Plan  Times per week: 4+  Current Treatment Recommendations: Strengthening, ROM, Endurance Training, Neuromuscular Re-education, Patient/Caregiver Education & Training, Equipment Evaluation, Education, & procurement, Balance Training, Pain Management, Self-Care / ADL, Functional Mobility Training, Safety Education & Training, Home Management Training    Electronically signed by   OCTAVIO Fernandez, OTR/L  3/3/2020, 3:27 PM

## 2020-03-03 NOTE — PROGRESS NOTES
Nutrition Assessment    Type and Reason for Visit: Reassess(Swing Bed)    Nutrition Recommendations: Could benefit from appetite stimulant or enteral nutrition to meet her nutritional needs due to poor oral intakes. Nutrition Assessment: Pt with no new improvement in oral intakes 1-25% of most meals and supplements. Malnutrition Assessment:  · Malnutrition Status: Meets the criteria for severe malnutrition  · Context: Acute illness or injury  · Findings of the 6 clinical characteristics of malnutrition (Minimum of 2 out of 6 clinical characteristics is required to make the diagnosis of moderate or severe Protein Calorie Malnutrition based on AND/ASPEN Guidelines):  1. Energy Intake-Less than or equal to 75% of estimated energy requirement, Greater than or equal to 1 month    2. Weight Loss-7.5% loss or greater, in 1 week  3. Fat Loss-Severe subcutaneous fat loss, Orbital  4. Muscle Loss-Severe muscle mass loss, Temples (temporalis muscle)  5. Fluid Accumulation-Unable to assess,    6.   Strength-Not measured    Nutrition Risk Level: High    Nutrient Needs:  · Estimated Daily Total Kcal: 2414-9101  · Estimated Daily Protein (g): 57-68  · Estimated Daily Total Fluid (ml/day): 2478-5626(1ml/kcal)    Nutrition Diagnosis:   · Problem: Inadequate oral intake, Severe malnutrition, In context of acute illness or injury  · Etiology: related to Insufficient energy/nutrient consumption     Signs and symptoms:  as evidenced by Weight loss 1-2% in 1 week, Severe loss of subcutaneous fat, Severe muscle loss    Objective Information:  · Nutrition-Focused Physical Findings: Tuan Nutrition Score 1  · Wound Type: None  · Current Nutrition Therapies:  · Oral Diet Orders: General   · Oral Diet intake: 0%, 1-25%  · Oral Nutrition Supplement (ONS) Orders: Frozen Oral Supplement  · ONS intake: 0%, 1-25%  · Anthropometric Measures:  · Ht: 5' 5\" (165.1 cm)   · Current Body Wt: 192 lb (87.1 kg)  · Admission Body Wt: 200 lb

## 2020-03-03 NOTE — PROGRESS NOTES
Exam:  Gen:  awake, alert, cooperative, no apparent distress   EYES:  Lids and lashes normal, pupils equal, round and reactive to light, extra ocular muscles intact, sclera clear, conjunctiva normal  ENT:  Normocephalic, oral pharynx with moist mucus membranes, tonsils without erythema or exudates,  NECK:  Supple, symmetrical, trachea midline, no adenopathy,  LUNGS:  Clear to auscultate bilaterally, no rales ronchi or wheezing noted. CARDIOVASCULAR:  regular rate and rhythm, normal S1 and S2, no S3 or S4, and no murmur noted  ABDOMEN: Normal BS, Non tender, non distended, no HSM noted. MUSCULOSKELETAL:  Proximal muscle weakness is improved in am  NEUROLOGIC: AOx 3,  Cranial nerves II-XII are grossly intact. Motor is 3-4 out of 5 bilaterally. Sensory is intact  SKIN:  no bruising or bleeding, normal skin color, texture, turgor and no redness, warmth, or swelling    Xr Knee Left (3 Views)    Result Date: 2/11/2020  EXAMINATION: THREE XRAY VIEWS OF THE LEFT KNEE 2/11/2020 12:53 pm COMPARISON: 06/17/2019 HISTORY: ORDERING SYSTEM PROVIDED HISTORY: pain TECHNOLOGIST PROVIDED HISTORY: Reason for exam:->pain Reason for Exam: pain Acuity: Chronic Type of Exam: Unknown FINDINGS: Severe narrowing of the medial compartment of the knee is present with mild-to-moderate narrowing laterally. Moderate narrowing of the patellofemoral joint is also present with large hypertrophic bony spurs. Moderate to large size spurring is present along the medial joint line. No fracture, destructive bony lesion or joint effusion. No soft tissue swelling. Osteopenia. 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 No acute abnormality. No significant change from the prior study.      Xr Tibia Fibula Left (2 Views)    Result Date: 2/21/2020  EXAMINATION: 2 XRAY VIEWS OF THE LEFT TIBIA AND FIBULA 2/20/2020 6:41 pm COMPARISON: None HISTORY: ORDERING SYSTEM PROVIDED HISTORY: leg pain TECHNOLOGIST PROVIDED HISTORY: Reason for exam:->leg pain Reason for Exam: pain Acuity: Acute Type of Exam: Initial Additional signs and symptoms: lower leg pain and mild swelling FINDINGS: Two views of the left tibia and fibula demonstrate an oblique acute to subacute fracture of the distal fibula. Minimal displacement is seen. Severe degenerative changes of the left knee are present. No acute soft tissue abnormality. Multiple surgical clips project in the soft tissues. 1. Acute to subacute fracture of the distal fibula. Minimal displacement is present. 2. Severe degenerative changes of the left knee. 3. No acute soft tissue abnormality identified. Ct Head Wo Contrast    Result Date: 2/11/2020  EXAMINATION: CT OF THE HEAD WITHOUT CONTRAST  2/11/2020 12:50 pm TECHNIQUE: CT of the head was performed without the administration of intravenous contrast. Dose modulation, iterative reconstruction, and/or weight based adjustment of the mA/kV was utilized to reduce the radiation dose to as low as reasonably achievable. COMPARISON: 01/13/2020. HISTORY: ORDERING SYSTEM PROVIDED HISTORY: abnormal contractures TECHNOLOGIST PROVIDED HISTORY: Reason for exam:->abnormal contractures Has a \"code stroke\" or \"stroke alert\" been called? ->No Reason for Exam: abnormal contractures Acuity: Acute Type of Exam: Initial Additional signs and symptoms: none Relevant Medical/Surgical History: poor historian FINDINGS: BRAIN/VENTRICLES: There is no acute intracranial hemorrhage, mass effect or midline shift. No abnormal extra-axial fluid collection. The gray-white differentiation is maintained without evidence of an acute infarct. There is no evidence of hydrocephalus. The cerebral sulci and ventricles are enlarged. There is low-attenuation within the periventricular white matter deep white matter of the brain. 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  ORBITS: The visualized portion of the orbits demonstrate no acute abnormality. SINUSES: The visualized paranasal sinuses and mastoid air cells demonstrate no acute abnormality. SOFT TISSUES/SKULL:  No acute abnormality of the visualized skull or soft tissues. 1. No acute intracranial abnormality. 2. Diffuse cerebral atrophy with chronic small vessel ischemic disease. Mri Cervical Spine Wo Contrast    Result Date: 2/21/2020  EXAMINATION: MRI OF THE CERVICAL SPINE WITHOUT CONTRAST 2/19/2020 9:02 am TECHNIQUE: Multiplanar multisequence MRI of the cervical spine was performed without the administration of intravenous contrast. COMPARISON: None HISTORY: ORDERING SYSTEM PROVIDED HISTORY: radiculopathy TECHNOLOGIST PROVIDED HISTORY: Reason for exam:->radiculopathy Reason for Exam: Recent fall, ataxia. Pain Acuity: Acute Type of Exam: Initial Additional signs and symptoms: No HX of CA or surgery, Pt was unable to hold still throughout exam. FINDINGS: Multiple sequences are degraded by motion. BONES/ALIGNMENT: Normal alignment of the spine. Vertebral body heights are maintained. No concerning marrow signal abnormality. Spinal canal is congenitally narrow. SPINAL CORD: No abnormal cord signal is seen. SOFT TISSUES: Paraspinal soft tissues are normal.  No prevertebral edema. C2-C3: Posterior disc osteophyte complex and bilateral uncovertebral and facet joint degenerative changes. No spinal canal or neural foraminal stenosis. C3-C4: Posterior disc osteophyte complex and bilateral uncovertebral and facet joint degenerative changes. Mild spinal canal stenosis. Severe right and mild left neural foraminal stenosis. C4-C5: Posterior disc osteophyte complex and bilateral uncovertebral and facet joint degenerative changes. Moderate spinal canal stenosis. Severe bilateral neural foraminal stenosis. C5-C6: Posterior disc osteophyte complex and bilateral uncovertebral and facet joint degenerative changes. Mild spinal canal stenosis.   Severe right and mild left neural joint degenerative changes. No spinal canal stenosis. Mild bilateral neural foraminal stenosis. L2-L3: Disc bulge and bilateral facet joint degenerative changes with prominence of the ligamentum flavum. Moderate spinal canal stenosis. Mild bilateral neural foraminal stenosis. L3-L4: Bulge and bilateral facet joint degenerative changes. Severe spinal canal stenosis. Moderate right and mild left neural foraminal stenosis. L4-L5: Anterolisthesis with uncovering of a disc bulge. Bilateral facet joint degenerative changes. Moderate spinal canal stenosis. Mild bilateral neural foraminal stenosis. L5-S1: No disc bulge or protrusion. No spinal canal or neural foraminal stenosis. Motion degraded study. Multilevel degenerative changes of the lumbar spine. There is severe spinal canal stenosis at L3-L4. There is moderate spinal canal stenosis at L2-L3 and L4-L5. Multilevel neural foraminal narrowing, including moderate right neural foraminal stenosis at L3-L4. Mri Brain W Wo Contrast    Result Date: 2/13/2020  EXAMINATION: MRI OF THE BRAIN WITHOUT AND WITH CONTRAST  2/13/2020 3:10 pm TECHNIQUE: Multiplanar multisequence MRI of the head/brain was performed without and with the administration of intravenous contrast. COMPARISON: 01/14/2020 HISTORY: ORDERING SYSTEM PROVIDED HISTORY: left cerebellar ataxia LUE TECHNOLOGIST PROVIDED HISTORY: Reason for exam:->left cerebellar ataxia LUE Reason for Exam: ataxia, LUE weakness, 10 mL multihance Acuity: Acute Type of Exam: Initial FINDINGS: INTRACRANIAL STRUCTURES/VENTRICLES:  There is no acute infarct. No mass effect or midline shift. No evidence of an acute intracranial hemorrhage. The ventricles and sulci are normal in size and configuration. The sellar/suprasellar regions appear unremarkable. The normal signal voids within the major intracranial vessels appear maintained. No abnormal focus of enhancement is seen within the brain.  Moderate chronic small vessel

## 2020-03-04 NOTE — PROGRESS NOTES
Physical Therapy     SWING BED WEEKLY TEAM SHEET      Ayah Waters   3/4/2020             WEEK# 1    PHYSICAL THERAPY       Ambulation: not performed    Wt bearing: WBAT with L cast boot     W/C skills:  Not performed   Stairs:  Not performed   Pain:  Intermittent LLE pain   Transfers:   Sit to stand: max A x 2   Stand to sit: Max A x 2          Bed Mobility:  Rolls right:  CGA   Rolls left:   CGA  Positioning: max A    Supine to sit: min A      Sit to supine:   CGA       Strength/ROM: 3+/5     Balance:     Static sitting    [] P  [] F-   [x] F    [] F+    [] G               Dynamic Sitting           [] P  [x] F-   [] F    [] F+    [] G                     Static standing            [x] P  [] F-   [] F    [] F+    [] G   []  With  [] Without device Dynamic standing       [x] P  [] F-   [] F    [] F+    [] G   []  With  [] Without device  (P= poor   F= fair   G= good)    Issues to be resolved before discharge transfers, balance, mobility    Goals of previous week  [x] 1st team   [] met   [] partially met    [] not met                                          Why the goals were not met limited by impaired motor control and ataxia    Goals:   Short term goal 1: Patient will perform bed mobility with min A.- MET  Short term goal 2: P will stand x 1 min at LRAD with min A demonstrating improved strength and standing tolerance. Short term goal 3: P will perform stand step transfer with LRAD and mod A from bed <>chair. Short term goal 4: P will perform sit <> stand with mod A to LRAD.            Physical Therapy Signature:  Juan Manuel Alvares PT

## 2020-03-04 NOTE — PROGRESS NOTES
auscultate bilaterally, no rales ronchi or wheezing noted. CARDIOVASCULAR:  regular rate and rhythm, normal S1 and S2, no S3 or S4, and no murmur noted  ABDOMEN: Normal BS, Non tender, non distended, no HSM noted. MUSCULOSKELETAL:  ROM of all extremities grossly wnl; is able to grasp objects but widely splays fingers; no facial tics noted however. NEUROLOGIC: AOx 3,  Cranial nerves II-XII are grossly intact. Motor is 5 out of 5 bilaterally. Sensory is intact  SKIN:  no bruising or bleeding, normal skin color, texture, turgor and no redness, warmth, or swelling     Xr Knee Left (3 Views)     Result Date: 2/11/2020  EXAMINATION: THREE XRAY VIEWS OF THE LEFT KNEE 2/11/2020 12:53 pm COMPARISON: 06/17/2019 HISTORY: ORDERING SYSTEM PROVIDED HISTORY: pain TECHNOLOGIST PROVIDED HISTORY: Reason for exam:->pain Reason for Exam: pain Acuity: Chronic Type of Exam: Unknown FINDINGS: Severe narrowing of the medial compartment of the knee is present with mild-to-moderate narrowing laterally. Moderate narrowing of the patellofemoral joint is also present with large hypertrophic bony spurs. Moderate to large size spurring is present along the medial joint line. No fracture, destructive bony lesion or joint effusion. No soft tissue swelling. Osteopenia.      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 No acute abnormality. No significant change from the prior study.      Ct Head Wo Contrast     Result Date: 2/11/2020  EXAMINATION: CT OF THE HEAD WITHOUT CONTRAST  2/11/2020 12:50 pm TECHNIQUE: CT of the head was performed without the administration of intravenous contrast. Dose modulation, iterative reconstruction, and/or weight based adjustment of the mA/kV was utilized to reduce the radiation dose to as low as reasonably achievable. COMPARISON: 01/13/2020.  HISTORY: ORDERING SYSTEM PROVIDED HISTORY: abnormal contractures TECHNOLOGIST PROVIDED HISTORY: Reason for exam:->abnormal contractures Has a \"code stroke\" or \"stroke alert\" been called? ->No Reason for Exam: abnormal contractures Acuity: Acute Type of Exam: Initial Additional signs and symptoms: none Relevant Medical/Surgical History: poor historian FINDINGS: BRAIN/VENTRICLES: There is no acute intracranial hemorrhage, mass effect or midline shift. No abnormal extra-axial fluid collection. The gray-white differentiation is maintained without evidence of an acute infarct. There is no evidence of hydrocephalus. The cerebral sulci and ventricles are enlarged. There is low-attenuation within the periventricular white matter deep white matter of the brain. 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 ORBITS: The visualized portion of the orbits demonstrate no acute abnormality. SINUSES: The visualized paranasal sinuses and mastoid air cells demonstrate no acute abnormality. SOFT TISSUES/SKULL:  No acute abnormality of the visualized skull or soft tissues.      1. No acute intracranial abnormality. 2. Diffuse cerebral atrophy with chronic small vessel ischemic disease.      Mri Brain W Wo Contrast     Result Date: 2/13/2020  EXAMINATION: MRI OF THE BRAIN WITHOUT AND WITH CONTRAST  2/13/2020 3:10 pm TECHNIQUE: Multiplanar multisequence MRI of the head/brain was performed without and with the administration of intravenous contrast. COMPARISON: 01/14/2020 HISTORY: ORDERING SYSTEM PROVIDED HISTORY: left cerebellar ataxia LUE TECHNOLOGIST PROVIDED HISTORY: Reason for exam:->left cerebellar ataxia LUE Reason for Exam: ataxia, LUE weakness, 10 mL multihance Acuity: Acute Type of Exam: Initial FINDINGS: INTRACRANIAL STRUCTURES/VENTRICLES:  There is no acute infarct. No mass effect or midline shift. No evidence of an acute intracranial hemorrhage. The ventricles and sulci are normal in size and configuration. The sellar/suprasellar regions appear unremarkable.   The normal signal voids within the major intracranial vessels appear maintained. No abnormal focus of enhancement is seen within the brain. Moderate chronic small vessel ischemic disease is identified within the periventricular white matter. Mild cerebral atrophy is appreciated. Additional chronic microvascular disease is identified within the pete. Tiny old right cerebellar lacune is identified. ORBITS: The visualized portion of the orbits demonstrate no acute abnormality. SINUSES: The visualized paranasal sinuses and mastoid air cells are well aerated. BONES/SOFT TISSUES: The bone marrow signal intensity appears normal. The soft tissues demonstrate no acute abnormality.      No evidence of acute intracranial abnormality. Stable moderate chronic microvascular disease within the periventricular white matter with associated mild atrophy. Tiny old right cerebellar lacune. No abnormal enhancement within the brain. -     DATA:    CBC       Recent Labs     02/17/20  0600   WBC 7.5   HGB 8.8*   HCT 27.4*         BMP       Recent Labs     02/17/20  0600      K 3.4*      CO2 18*   BUN 17   CREATININE 1.6*      LFT'S No results for input(s): AST, ALT, ALB, BILIDIR, BILITOT, ALKPHOS in the last 72 hours. COAG No results for input(s): INR in the last 72 hours. CARDIAC ENZYMES  No results for input(s): CKTOTAL, CKMB, CKMBINDEX, TROPONINI in the last 72 hours.   U/A:    Lab Results   Component Value Date     COLORU YELLOW 02/12/2020     WBCUA 175 02/12/2020     RBCUA 8 02/12/2020     MUCUS RARE 02/12/2020     BACTERIA NEGATIVE 02/12/2020     CLARITYU SLIGHTLY CLOUDY 02/12/2020     SPECGRAV 1.011 02/12/2020     LEUKOCYTESUR LARGE 02/12/2020     BLOODU SMALL 02/12/2020            Bucky George MD  Rounding Hospitalist

## 2020-03-04 NOTE — PROGRESS NOTES
CGA  for safety . Pt performed supine <> sit with HOB flat with min A for LLE positioning. Pt returned to supine without assist, max A to scoot up in bed 2/2 unable to bridge. []   Pt received out of bed   Rolling R/L:        Transfers: x     Assistive device required for transfer:       Functional Mobility:    Assistance:  Unable;   Device:   []   Rolling Walker     []   Standard Walker []   Wheelchair        []   [employer]       []   4-Wheeled [employer]         []   Cardiac Walker       []   Other:        Homemaking Tasks: x    Other ADL Tasks:   Pt reports she has been able to feed self Mod I using built up utensils when daughter is not there to help. Additional Therapeutic activities/exercises completed this date:     [x]   ADL Training   [x]   Balance/Postural training     [x]   Bed/Transfer Training   []   Endurance Training   []   Neuromuscular Re-ed   []   Nu-step:  Time:        Level:         #Steps:       []   Rebounder:    []  Seated     []  Standing        []   Supine Ther Ex (reps/sets):     [x]   Seated Ther Ex (reps/sets):10 reps of the following exercises: performed shoulder flexion x 5 BUE. Reaching in different planes with BUE x10 for increased motor planning and improved control of BUE.    []   Standing Ther Ex (reps/sets):     []   Other:      Comments:      Patient/Caregiver Education and Training:   []   Adaptive Equipment Use  [x]   Bed Mobility/Transfer Technique/Safety  []   Energy Conservation Tips  []   Family training  []   Postural Awareness  [x]   Safety During Functional Activities  []   Reinforced Patient's Precautions   []   Progress was updated and reviewed in Rehabtracker with patient and/or family this         date. Treatment Plan for Next Session: transfers, exercise     Assessment: Pt demoed increased bed mobility in prep for/during functional tasks today. Balance at EOB is increasing. Still demos decreased ability to motor plan/control which limits pt. Treatment/Activity Tolerance:   [] Tolerated treatment with no adverse effects    [x] Patient limited by fatigue  [] Patient limited by pain   [] Patient limited by medical complications:    [] Adverse reaction to Tx:   [] Significant change in status    Safety:       [x]  bed alarm set    []  chair alarm set    []  Pt refused alarms                []  Telesitter activated      [x]  Gait belt used during tx session      []other:       Number of Minutes/Billable Intervention  Therapeutic Exercise    ADL Self-care 25   Neuro Re-Ed    Therapeutic Activity 10   Group    Other:    TOTAL 35       Social History  Social/Functional History  Lives With: Daughter  Type of Home: Apartment  Home Layout: One level  Home Access: Level entry  Bathroom Shower/Tub: Tub/Shower unit, Shower chair with back  Bathroom Toilet: Standard  Bathroom Equipment: 3-in-1 commode  Home Equipment: [employer], [address] Help From: Family  ADL Assistance: Independent  Homemaking Assistance: Independent  Homemaking Responsibilities: Yes  Meal Prep Responsibility: Primary  Laundry Responsibility: Primary  Cleaning Responsibility: Primary  Shopping Responsibility: Primary  Ambulation Assistance: Needs assistance(Uses cane occasionally in community, does not use AD at home)  Transfer Assistance: Independent  Active : Yes  Leisure & Hobbies: plays [employer] every wednesday, hang with her friends  Additional Comments: Pt's daughter reports Pt had a continuous decline since admission at [facility] in January    Objective                                                                                    Goals:  (Update in navigator)  Short term goals  Time Frame for Short term goals: until discharge  Short term goal 1: Pt will be Min A x1 for functional adl transfers to chair, toilet or bed w/o LOB or falls  Short term goal 2: In a supported position, pt will be able to feed self with necessary a.e. Short term goal 3:  In supported position, Pt will be SBA for UB bathing/Mod A LB bathing  Short term goal 4:  In a supported position Pt will be Min A UB dressing and with compensatory techniques Mod A LB dressing  Short term goal 5: Pt will be min vc for UE strengthening and weightbearing activities to improve coordination and assist with strength for transfers:   :        Plan of Care                                                                                Treatment to include Plan  Times per week: 4+  Current Treatment Recommendations: Strengthening, ROM, Endurance Training, Neuromuscular Re-education, Patient/Caregiver Education & Training, Equipment Evaluation, Education, & procurement, Balance Training, Pain Management, Self-Care / ADL, Functional Mobility Training, Safety Education & Training, Home Management Training    Electronically signed by   OCTAVIO Presley, OTR/L  3/4/2020, 12:48 PM

## 2020-03-04 NOTE — H&P
History and Physical  Damaris Bowen MD     2/21/2020  Twyla Kim  1940     PCP: Humberto Rodriges MD     ASSESSMENT AND PLAN:       · Left distal fibula fracture  · Generalized weakness, physical debility and falls -evaluated by neurology, MRI nonacute  · Possible peripheral neuropathy  · CAD/CABG -stable with no chest pain  · DM type II -borderline on no medications  · CKD stage III-IV  · Hypertension  · Hyperlipidemia  · Acid reflux disease  · Spasticity and incontinence     -orthopedic surgeon consulted for assessment and recommendations of LLE with distal fib fracture  - tylenol/tramadol prn for pain  - PTOT will be consulted once with recs on weight bearing from orthopedic surgeon available  - is not on gabapentin for PN  - is on bb, no statin, or asa for cad  - as bg have been running low: poct achs, as needed hypoglycemia protocol, hold hypoglycemics A1c 5.4 on 1/31/2020  - monitor bp per unit protocol  - statin was held due to weakness per discharge on 2/14/2020  - continue IM benadryl as this has helped (vs sinimet vs prdnisone), will also continue sinimet and benadryl for now     DVT prophy -  lovenox  Expected LOS at least 48 hours        Cc: Left leg pain     HPI: Twyla Kim is a 67 y.o. Patient who was admitted to [facility] for rehab needs following an inpatient stay at [facility] for weakness, low appetite and falls. Feb 11th 2020 she had a plain film of her knee which showed no fracture. She has spacticity and bladder incontinence and underwent neurologic workup consisting of CT+MRI of the brain which showed no stroke. While at [facility] her spacticity was addressed by trials of siniment, steroid and IM benadryl.  Her family noted that she was favoring her right and protective of her Left Lower Limb so an XR was ordered which showed distal minimally displaced fibula fracture for which patient is being discharged from [facility] for assessment by orthopedic surgeon.            PMHX: Past Medical History:   Diagnosis Date    Acid reflux      Arthritis       \"Back\"    Back pain       \"Back Hurts Sometimes\"    CAD (coronary artery disease)       Sees Dr. Gonzalez Boys Diabetes mellitus [facility]) Dx 2000's     \"Borderline\"    Hiatal hernia      Hx of blood clots 2001     \"Blood Clots Legs After Heart Surgery\"    Hx of migraines      HX OTHER MEDICAL       Primary Care Physician Is Dr. Kylah Olivares Hyperlipidemia      Hypertension      IBS (irritable bowel syndrome)      Right Breast Cancer 2005 Or 2006      Right Breast Mastectomy    Shortness of breath on exertion      UTI (urinary tract infection) In Past     No Current Symptoms    Wears dentures       Full Upper    Wears glasses       To Read      PSHX:  has a past surgical history that includes Cardiac surgery (2001); Dilatation, esophagus (\"3 Or 4\" Last Done 7-14); eye surgery (Bilateral, 2000's); Dental surgery; [place] tooth extraction; Appendectomy (Unsure When); Colonoscopy (\"Two\" Last Done 2000's); Cholecystectomy, laparoscopic (1990's Or 2000's); Breast surgery (Right, 2005 Or 2006); Inguinal hernia repair (Right, 1980's Or 1990's); hernia repair (1960's); Tubal ligation (1965); Tonsillectomy (1970's); Gastric fundoplication (3/0/04); hiatal hernia repair (2014); and Upper gastrointestinal endoscopy (N/A, 12/27/2019).    Meds - list of home medications reviewed in electronic chart and confirmed  Allergies: No Known Allergies     FAM HX: family history includes Early Death in her brother; Early Death (age of onset: 28) in her father; Early Death (age of onset: 28) in her son; Early Death (age of onset: 40) in her brother; Heart Disease in her brother, father, and mother; Other in her daughter.   Soc HX:   Social History            Socioeconomic History    Marital status: Single       Spouse name: None    Number of children: None    Years of education: None    Highest education level: None   Occupational History    Reason for Exam: pain Acuity: Chronic Type of Exam: Unknown FINDINGS: Severe narrowing of the medial compartment of the knee is present with mild-to-moderate narrowing laterally. Moderate narrowing of the patellofemoral joint is also present with large hypertrophic bony spurs. Moderate to large size spurring is present along the medial joint line. No fracture, destructive bony lesion or joint effusion. No soft tissue swelling. Osteopenia.      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 No acute abnormality. No significant change from the prior study.      Xr Tibia Fibula Left (2 Views)     Result Date: 2/20/2020  EXAMINATION: 2 XRAY VIEWS OF THE LEFT TIBIA AND FIBULA 2/20/2020 6:41 pm COMPARISON: None HISTORY: ORDERING SYSTEM PROVIDED HISTORY: leg pain TECHNOLOGIST PROVIDED HISTORY: Reason for exam:->leg pain Reason for Exam: pain Acuity: Acute Type of Exam: Initial Additional signs and symptoms: lower leg pain and mild swelling FINDINGS: Two views of the left tibia and fibula demonstrate an oblique acute to subacute fracture of the distal fibula. Minimal displacement is seen. Severe degenerative changes of the left knee are present. No acute soft tissue abnormality. Multiple surgical clips project in the soft tissues.      1. Acute to subacute fracture of the distal fibula. Minimal displacement is present. 2. Severe degenerative changes of the left knee. 3. No acute soft tissue abnormality identified.      Ct Head Wo Contrast     Result Date: 2/11/2020  EXAMINATION: CT OF THE HEAD WITHOUT CONTRAST  2/11/2020 12:50 pm TECHNIQUE: CT of the head was performed without the administration of intravenous contrast. Dose modulation, iterative reconstruction, and/or weight based adjustment of the mA/kV was utilized to reduce the radiation dose to as low as reasonably achievable. COMPARISON: 01/13/2020.  HISTORY: ORDERING SYSTEM PROVIDED HISTORY: abnormal contractures TECHNOLOGIST PROVIDED HISTORY: Reason for exam:->abnormal contractures Has a \"code stroke\" or \"stroke alert\" been called? ->No Reason for Exam: abnormal contractures Acuity: Acute Type of Exam: Initial Additional signs and symptoms: none Relevant Medical/Surgical History: poor historian FINDINGS: BRAIN/VENTRICLES: There is no acute intracranial hemorrhage, mass effect or midline shift. No abnormal extra-axial fluid collection. The gray-white differentiation is maintained without evidence of an acute infarct. There is no evidence of hydrocephalus. The cerebral sulci and ventricles are enlarged. There is low-attenuation within the periventricular white matter deep white matter of the brain. 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 ORBITS: The visualized portion of the orbits demonstrate no acute abnormality. SINUSES: The visualized paranasal sinuses and mastoid air cells demonstrate no acute abnormality. SOFT TISSUES/SKULL:  No acute abnormality of the visualized skull or soft tissues.      1. No acute intracranial abnormality. 2. Diffuse cerebral atrophy with chronic small vessel ischemic disease.      Mri Cervical Spine Wo Contrast     Result Date: 2/19/2020  EXAMINATION: MRI OF THE CERVICAL SPINE WITHOUT CONTRAST 2/19/2020 9:02 am TECHNIQUE: Multiplanar multisequence MRI of the cervical spine was performed without the administration of intravenous contrast. COMPARISON: None HISTORY: ORDERING SYSTEM PROVIDED HISTORY: radiculopathy TECHNOLOGIST PROVIDED HISTORY: Reason for exam:->radiculopathy Reason for Exam: Recent fall, ataxia. Pain Acuity: Acute Type of Exam: Initial Additional signs and symptoms: No HX of CA or surgery, Pt was unable to hold still throughout exam. FINDINGS: Multiple sequences are degraded by motion. BONES/ALIGNMENT: Normal alignment of the spine. Vertebral body heights are maintained. No concerning marrow signal abnormality.   Spinal canal is Generalized weakness    Severe malnutrition (HCC)    Closed fracture of shaft of left fibula, initial encounter      ______________________________________________________________     Electronically signed by Rocky Costa MD on 2/21/2020 at 10:02 AM

## 2020-03-04 NOTE — CARE COORDINATION
SWING BED WEEKLY TEAM SHEET     Chan Mendez   3/4/2020 WEEK # 1    Care Management    Issues to be resolved before discharge: Increased strength and mobility    Family Education: Patient/sfatt to update family     Discharge Plan: [facility]  Patient/Family Adjustment: family encouraged to develop a long-term plan      Goals of previous week:   [] 1st team   [] met   [] partially met    [x] not met             Why goals were not met: Continued weakness, inability to ambulate     Skilled Level of Care Remains   [x] yes   [] no    Estimated length of stay: Anticipated discharge to [facility] Friday 3/6/20  Patient/Family Concerns/input: None voiced     Patient/Family  Signature _________________  3/4/2020       Care Management Signature:  Benny Trujillo RN

## 2020-03-04 NOTE — PLAN OF CARE
Patient refused to turn today, states she cannot hold her own cup or turn. Problem: Health Behavior:  Goal: Ability to manage health-related needs will improve  Description  Ability to manage health-related needs will improve  Outcome: Ongoing     Patient has refused to eat at meals, she has drank some ensure but has denied being hungry.    Problem: Nutrition  Goal: Optimal nutrition therapy  3/3/2020 1935 by Destiny Dodson RN  Outcome: Ongoing  3/3/2020 1322 by Thania Gill RD, LD  Outcome: Ongoing

## 2020-03-04 NOTE — PROGRESS NOTES
Physical Therapy    Physical Therapy Treatment Note  Name: Monika Canales MRN: [id number] :   1940   Date:  3/4/2020   Admission Date: 2020 Room:  [address] [place] [address]   Restrictions/Precautions:  Restrictions/Precautions  Restrictions/Precautions: Fall Risk, Weight Bearing  Required Braces or Orthoses?: Yes(LLE boot when standing) Lower Extremity Weight Bearing Restrictions  Left Lower Extremity Weight Bearing: Weight Bearing As Tolerated(Wearing boot in standing)     Communication with other providers:  Updated p progress in rounds. Nursing aware PT/OT working with patient. Subjective:  Patient states:  \"What are we going to do? \"  Pain:   Location, Type, Intensity (0/10 to 10/10):  intermittent LE pain with positioning  Objective:    Observation:  P supine in bed with HOB elevated  Treatment, including education/measures:  Bed mobility: P performs supine <>sit with HOB elevated with CGA. P requires increased time to correct LE positioning 2/2 impaired motor control and dysmetria. P demos significantly improved core control. P unable to use UEs to stabilize 2/2 impaired motor control. P performs rolling BRENDA x 4 reps while assisting with toileting. P performs rolling with CGA. P requires CGA for safety 2/2 dysmetria and tendency to overshoot movement. P performs supine <> sit with HOB flat with min A for LLE positioning. P returns to supine without assist. P requires max A to scoot up in bed 2/2 unable to bridge with LLE pain and unable to grasp with UEs. Sitting balance: P sits EOB x 5 min with CGA to min A to reach within SHON BUEs. P limited by decreased ability to stabilize with limbs. P performs LAQ x 5 reps Brenda with CGA for safety to maintain balance.   Education: P educated on significant progress with bed mobility and continued tasks to address  P left supine in bed with bed alarm on, call light within reach and 4 bed rails up per p request. P educated that this could be seen as a restraint and p requested and mod A from bed <>chair. Short term goal 4: P will perform sit <> stand with mod A to LRAD.        Electronically signed by:    Sherlyn Ortiz PT  3/4/2020, 11:29 AM

## 2020-03-05 NOTE — FLOWSHEET NOTE
Physical Therapy DailyTreatment Note   Date: 3/5/2020 Room: [unfilled]   Name: Twyla Kim : 1940   MRN: [id number]   Admission Date:2020        Rehabilitation Diagnosis: Debility [R53.81]  Generalized weakness [R53.1]    Objective                                                                                    Goals:  (Update in navigator)  Short term goals  Time Frame for Short term goals: 1 week  Short term goal 1: Patient will perform bed mobility with min A. Short term goal 2: P will stand x 1 min at LRAD with min A demonstrating improved strength and standing tolerance. Short term goal 3: P will perform stand step transfer with LRAD and mod A from bed <>chair. Short term goal 4: P will perform sit <> stand with mod A to LRAD. :   :        Plan of Care                                                                              Times per week: 5+ days per week for a minimum of 15 minutes/day  Treatment to include Current Treatment Recommendations: Strengthening, Transfer Training, Endurance Training, Neuromuscular Re-education, Patient/Caregiver Education & Training, Pain Management, Equipment Evaluation, Education, & procurement, Balance Training, Gait Training, Home Exercise Program, Safety Education & Training, Positioning, Functional Mobility Training, Stair training, Manual Therapy - Joint Manipulation, Wheelchair Mobility Training, ROM    Date  3/5/2020   TIMES IN/OUT   2:43-3:22 pm   Restrictions/Precautions Restrictions/Precautions: Fall Risk, Weight Bearing, boot         Current Diet/Swallowing Issues DIET GENERAL;  Dietary Nutrition Supplements: Frozen Oral Supplement, Standard High Calorie Oral Supplement   Communication with other providers: [x] Ok to see per nursing   [] Medical hold and reason  [x] OT co treat   Subjective observations: Patient in bed upon arrival and refused to get up to chair reporting she is going tomorrow and is just tired and does not want to get up or use the ashley. She agreed to perform exercises. [x]   Gait belt used during tx session   Pain level/location: Pre-tx 0/10    Post-tx 0/10   Bed Mobility   Supine to sit: dependent with ashley lift - not performed  Rolling: not performed   Transfers Dependent ashley lift - not performed   Standing Tolerance Not performed. Pt refused   Amb/Locomotion: AD/Distance/Assist Not performed     W/C distance/Assist   Not performed   Strategies that improved performance: Pt required encouragement and did well with pleasant conversation. Barriers to progress/participation: Weakness, lack of pt eating. Alarm placed/where? Fall Risk  [x] left in bed  [ ] left in chair [X] call light within reach  [x] bed alarm on  [ ] personal alarm on [ ] Angie Lugo  [ ] other staff present:   Discharge recommendations  Anticipated discharge date:  TBD  Destination: []home alone   []home alone with assist prn  [x]Continuous supervision  [x]SNF/ECF  [] Assisted living   Continued therapy: []C PT  []OUTPATIENT  PT   [x] Further PT  Equipment needs: see eval     Therapeutic activities/exercises completed this date: Pt alternated UE and LE exercises. She continues to demonstrate slightly athetoid movements especially in UE and hands. She had difficulty scratching her arms or head. She has little hand control. Pt demonstrated decreased ROM of ankle on R today compared to L. She reported her R ankle and LE was numb and she could see PT touching her but could not feel it at foot.      Modality/intervention used:   [X] Therapeutic Exercise    [ ] Modalities:   [x] Therapeutic Activity    [ ] Ultrasound   [ ] Elec Stim   [ ] Gait Training     [ ] Cervical Traction  [ ] Lumbar Traction   [ ] Neuromuscular Re-education   [ ] Cold/hotpack  [ ] Iontophoresis   [ ] Instruction in HEP     [ ] Dinesh Don   [ ] Manual Therapy   [ ] Aquatic Therapy     Patient/Caregiver Education and Training: pt educated on importance of getting up, movement, and on performing exercises on her own. Exercise/Equipment/Modalities this date   Ankle pumps: DF/PF 3x15 AROM 1x and PROM 2x   Heel slides: 2xB LE    Quad set: 2x10 B LE   glute set: 2x10 B LE   Hip abd: 2x10 B LE        Treatment Plan for Next Session: continue with mobility as able.      Assessment / Impression                                                          Treatment/Activity Tolerance:   [x] Tolerated Treatment well:     [] Patient limited by fatigue/pain:       [x] Patient limited by medical complications:    [] Adverse Reaction to Tx:   [] Significant change in status    Plan:   Anna Marie.Cough ] Continue per plan of care [ ] Jeremiah Carter current plan   [ ] Plan of care initiated [ ] Hold pending MD visit [ ] Discharged    Billing:  Billed units: 1 therapeutic activity, 2 therapeutic exercise       Signed: Kwesi Foreman DPT [id number]  3/5/2020, 2:53 PM

## 2020-03-05 NOTE — PROGRESS NOTES
Occupational Therapy  Physical Rehabilitation: OCCUPATIONAL THERAPY     [x] daily progress note       [] discharge       Patient Name:  Andreas Beverly   :  1940 MRN: [id number]  Room:  [address] [place] [address] Date of Admission: 2020  Rehabilitation Diagnosis:   Debility [R53.81]  Generalized weakness [R53.1]       Date 3/5/2020         Time IN/OUT 6331-9957   Individual Tx Minutes    Group Tx Minutes    Co-Treat Minutes 39   Concurrent Tx Minutes    TOTAL Tx Time Mins 39   Variance Time    Variance Time []   Refusal due to:     []   Medical hold/reason:    []   Illness   []   Off Unit for test/procedure  []   Extra time needed to complete task  []   Therapeutic need  []   Other (specify):   Restrictions Restrictions/Precautions: Fall Risk, Weight Bearing         Communication with other providers: [x]   OK to see per nursing:     []   Spoke with team member regarding:      Subjective observations and cognitive status: Pt states: I don't want to get out of bed. Pain level/location:  0  /10       Location:  Intermittent right foot pain with postural changes    Discharge recommendations  Anticipated discharge date:   To be determined  Destination: []home alone   []home alone w assist prn   [] home w/ family    [] Continuous supervision       [x]SNF    [] Assisted living     [] Other:   Continued therapy: []HHC OT  []OUTPATIENT  OT   [] No Further OT  Equipment needs:to be determined     Toileting:  x  Toilet Transfers:   Device Used:    []   Standard Toilet         []   Grab Bars           []  Bedside Commode       []   Elevated Toilet          []   Other:        Bed Mobility: x     []   Pt received out of bed   Rolling R/L:        Transfers: x     Assistive device required for transfer:       Functional Mobility:    Assistance:  Unable;   Device:   []   Maryanne Joyner     []   Standard Walker []   Wheelchair        []   [employer]       []   [place] [employer]         []   Cardiac Walker       []   Other:        [employer] Tasks: x    Other ADL Tasks: Additional Therapeutic activities/exercises completed this date:     []   ADL Training   []   Balance/Postural training     []   Bed/Transfer Training   []   Endurance Training   []   Neuromuscular Re-ed   []   Nu-step:  Time:        Level:         #Steps:       []   Rebounder:    []  Seated     []  Standing        []   Supine Ther Ex (reps/sets):     [x]   Seated Ther Ex (reps/sets):10 reps 2 sets of the following exercises to Bilateral UE with rest breaks inbetween: shoulder flexion, wrist flexion/extension, biceps curls, shoulder AB/ADduction, wrist circles, flexion/extension of all digits. []   Standing Ther Ex (reps/sets):     []   Other:      Comments:  Encouragement given for pt to get up to chair for tx session using lift but pt refused stating that she did not want to get out of bed today. Pt refused grooming ADL tasks reporting that she had already brushed her teeth and combed her hair today. Patient/Caregiver Education and Training:   []   [employer] Equipment Use  []   Bed Mobility/Transfer Technique/Safety  []   Energy Conservation Tips  []   Family training  [x]   Postural Awareness  [x]   Safety During Functional Activities  []   Reinforced Patient's Precautions   []   Progress was updated and reviewed in Rehabtracker with patient and/or family this         date. Treatment Plan for Next Session: transfers, exercise     Assessment: Pt demo'ed slight improvements in UE motor control.        Treatment/Activity Tolerance:   [] Tolerated treatment with no adverse effects    [x] Patient limited by fatigue  [] Patient limited by pain   [] Patient limited by medical complications:    [] Adverse reaction to Tx:   [] Significant change in status    Safety:       [x]  bed alarm set    []  chair alarm set    []  Pt refused alarms                []  Telesitter activated      [x]  Gait belt used during tx session      []other:       Number of Minutes/Billable 4+  Current Treatment Recommendations: Strengthening, ROM, Endurance Training, Neuromuscular Re-education, Patient/Caregiver Education & Training, Equipment Evaluation, Education, & procurement, Balance Training, Pain Management, Self-Care / ADL, Functional Mobility Training, Safety Education & Training, Home Management Training    Electronically signed by   OCTAVIO Cunningham, OTR/L  3/5/2020, 3:31 PM

## 2020-03-05 NOTE — DISCHARGE INSTR - COC
Continuity of Care Form    Patient Name: Andreas Beverly   :  1940  MRN:  [id number]    Admit date:  2020  Discharge date:  3/6/2020    Code Status Order: Full Code   Advance Directives:   [facility] Documentation     Date/Time Healthcare Directive Type of Healthcare Directive Copy in [address] Agent's Name Healthcare Agent's Phone Number    [phone]  No, patient does not have an advance directive for healthcare treatment -- -- -- -- --          Admitting Physician:  Cony Olivas DO  PCP: Ioana Alston MD    Discharging Nurse: SEBASTIAN Wagner  Unit/Room#: 556/497-81  Discharging Unit Phone Number: [phone]    Emergency Contact:   Extended Emergency Contact Information  Primary Emergency Contact: Javier Jones1 [address] Phone: [phone]  Mobile Phone: [phone]  Relation: Child  Secondary Emergency Contact: Jazlyn Culver  Mobile Phone: [phone]  Relation: Child    Past Surgical History:  Past Surgical History:   Procedure Laterality Date    APPENDECTOMY  Unsure When    BREAST SURGERY Right  Or 2006    Right Mastectomy For Breast Cancer    CARDIAC SURGERY      CABG (Two Bypasses)    CHOLECYSTECTOMY, LAPAROSCOPIC  's Or 's    COLONOSCOPY  \"Two\" Last Done     Polyps Removed In Past    DENTAL SURGERY      All Upper Teeth Extracted In Past    DILATATION, ESOPHAGUS  \"3 Or 4\" Last Done 7-14    EYE SURGERY Bilateral 's    Cataracts With Lens Implants    GASTRIC FUNDOPLICATION  8/3/21    Robotic asssited laprascopic nissen fundoplication    HERNIA REPAIR  1434'V    Umbilical Hernia Repair    HIATAL HERNIA REPAIR  2014    INGUINAL HERNIA REPAIR Right 's Or 1990's    TONSILLECTOMY  1970's    TUBAL LIGATION  1965    UPPER GASTROINTESTINAL ENDOSCOPY N/A 2019    EGD BIOPSY performed by Cheryle Guillen MD at [address] [place] [address] TOOTH EXTRACTION      Three Removed In Past       Immunization History:   Immunization History   Administered Date(s) Administered    Pneumococcal Polysaccharide ([id number]) 08/18/2012       Active Problems:  Patient Active Problem List   Diagnosis Code    Hypertension I10    [facility] E11.9    Hyperlipidemia E78.5    Acid reflux K21.9    Chest pain R07.9    CAD (coronary artery disease) I25.10    RUSTY (acute kidney injury) ([facility] N17.9    Lower abdominal pain R10.30    Intractable nausea and vomiting R11.2    Duodenitis K29.80    Recurrent falls R29.6    Elevated troponin R79.89    Generalized weakness R53.1    Severe malnutrition (HCC) E43    Closed fracture of shaft of left fibula, initial encounter S82.402A    Closed displaced comminuted fracture of shaft of left fibula S82.452A       Isolation/Infection:   Isolation          No Isolation        Patient Infection Status     None to display          Nurse Assessment:  Last Vital Signs: /60   Pulse 86   Temp 97 °F (36.1 °C) (Oral)   Resp 16   Ht 5' 5\" (1.651 m)   Wt 192 lb 3.2 oz (87.2 kg)   SpO2 96%   BMI 31.98 kg/m²     Last documented pain score (0-10 scale): Pain Level: 0  Last Weight:   Wt Readings from Last 1 Encounters:   02/25/20 192 lb 3.2 oz (87.2 kg)     Mental Status:  oriented and alert    IV Access:  - None    Nursing Mobility/ADLs:  Walking   Dependent  Transfer  Dependent  Bathing  Dependent  Dressing  Dependent  Toileting  Dependent  Feeding  Dependent  Med Admin  Dependent  Med Delivery   whole    Wound Care Documentation and Therapy:  Incision 08/01/14 Abdomen Mid;Right;Left (Active)   Number of days: 2043        Elimination:  Continence:   · Bowel: No  · Bladder: No  Urinary Catheter: None   Colostomy/Ileostomy/Ileal Conduit: No       Date of Last BM: 3/5/2020    Intake/Output Summary (Last 24 hours) at 3/5/2020 1029  Last data filed at 3/5/2020 0918  Gross per 24 hour   Intake 120 ml   Output --

## 2020-03-06 NOTE — PLAN OF CARE
Problem: Falls - Risk of:  Goal: Will remain free from falls  Description  Will remain free from falls  3/6/2020 0024 by Vashti Kasper LPN  Outcome: Ongoing     Problem: Falls - Risk of:  Goal: Absence of physical injury  Description  Absence of physical injury  3/6/2020 0024 by Vashti Kasper LPN  Outcome: Ongoing     Problem: Risk for Impaired Skin Integrity  Goal: Tissue integrity - skin and mucous membranes  Description  Structural intactness and normal physiological function of skin and  mucous membranes. 3/6/2020 0024 by Vashti Kasper LPN  Outcome: Ongoing     Problem:  Activity:  Goal: Ability to tolerate increased activity will improve  Description  Ability to tolerate increased activity will improve  3/6/2020 0024 by Vashti Kasper LPN  Outcome: Ongoing     Problem: Pain:  Goal: Pain level will decrease  Description  Pain level will decrease  3/6/2020 0024 by Vashti Kasper LPN  Outcome: Ongoing     Problem: Pain:  Goal: Control of acute pain  Description  Control of acute pain  3/6/2020 0024 by Vashti Kasper LPN  Outcome: Ongoing     Problem: Health Behavior:  Goal: Ability to identify and utilize available resources and services will improve  Description  Ability to identify and utilize available resources and services will improve  3/6/2020 0024 by Vashti Kasper LPN  Outcome: Ongoing     Problem: Health Behavior:  Goal: Ability to manage health-related needs will improve  Description  Ability to manage health-related needs will improve  3/6/2020 0024 by Vashti Kasper LPN  Outcome: Ongoing     Problem: Nutrition  Goal: Optimal nutrition therapy  3/6/2020 0024 by Vashti Kasper LPN  Outcome: Ongoing

## 2020-03-06 NOTE — DISCHARGE SUMMARY
MOUTH ONE TIME A DAY              carbidopa-levodopa (SINEMET)  MG per tablet  Take 1 tablet by mouth 3 times daily             Cyanocobalamin (VITAMIN B 12 PO)  Take 1 tablet by mouth daily             folic acid (FOLVITE) 1 MG tablet  Take 1 mg by mouth daily             insulin lispro (HUMALOG) 100 UNIT/ML injection vial  Inject 0-3 Units into the skin nightly If continuous tube feedings/TPN/NPO, give correction dose based on result, no reduction in dose. If eating or bolus tube feeding: Glucose: Dose:  No Insulin, 140-249 1 Unit, 250-349 2 Units, 350 and above 3 Units             insulin lispro (HUMALOG) 100 UNIT/ML injection vial  Inject 0-6 Units into the skin 3 times daily (with meals) Glucose: Dose:  No Insulin, 140-199 1 Unit, 200-249 2 Units, 250-299 3 Units, 300-349 4 Units, 350-399 5 Units, 400 and above 6 Units             lisinopril (PRINIVIL;ZESTRIL) 2.5 MG tablet  TAKE 1 TABLET BY MOUTH ONE TIME A DAY              metoprolol (LOPRESSOR) 100 MG tablet  Take 100 mg by mouth 2 times daily. mirtazapine (REMERON) 45 MG tablet  Take 1 tablet by mouth nightly             ondansetron (ZOFRAN ODT) 4 MG disintegrating tablet  Take 1 tablet by mouth every 8 hours as needed for Nausea             predniSONE (DELTASONE) 20 MG tablet  Take 0.5 tablets by mouth daily for 10 days             sodium chloride (OCEAN, BABY AYR) 0.65 % nasal spray  1 spray by Nasal route every 2 hours as needed for Congestion             traMADol (ULTRAM) 50 MG tablet  Take 1 tablet by mouth every 6 hours as needed for Pain for up to 3 days. Code Status: Full Code     Consults: IP CONSULT TO SOCIAL WORK    Diet: cardiac diet    Activity: activity as tolerated       Discharged Condition: stable    Prognosis: Fair    Disposition: SNF      Follow-up with   1. PCP within   5-7    Days    Follow up labs: none       Discharge Physician Signed: Jarett Ramos M.D.     The patient was seen and examined on day of discharge and this discharge summary is in conjunction with any daily progress note from day of discharge.   Time spent on discharge in the examination, evaluation, counseling and review of medications and discharge plan: 22 minutes

## 2020-03-12 PROBLEM — R77.8 ELEVATED TROPONIN: Status: RESOLVED | Noted: 2020-01-01 | Resolved: 2020-01-01

## (undated) DEVICE — Z DISCONTINUED (USE MFG CAT MVABO)  TUBING GAS SAMPLING STD 6.5 FT FEMALE CONN SMRT CAPNOLINE

## (undated) DEVICE — FORCEPS BX L240CM JAW DIA2.8MM L CAP W/ NDL MIC MESH TOOTH